# Patient Record
Sex: FEMALE | Race: OTHER | HISPANIC OR LATINO | URBAN - METROPOLITAN AREA
[De-identification: names, ages, dates, MRNs, and addresses within clinical notes are randomized per-mention and may not be internally consistent; named-entity substitution may affect disease eponyms.]

---

## 2018-01-01 ENCOUNTER — INPATIENT (INPATIENT)
Facility: HOSPITAL | Age: 83
LOS: 6 days | DRG: 871 | End: 2018-03-11
Attending: INTERNAL MEDICINE | Admitting: INTERNAL MEDICINE
Payer: MEDICAID

## 2018-01-01 VITALS
WEIGHT: 145.95 LBS | DIASTOLIC BLOOD PRESSURE: 66 MMHG | RESPIRATION RATE: 18 BRPM | TEMPERATURE: 98 F | OXYGEN SATURATION: 100 % | HEART RATE: 56 BPM | SYSTOLIC BLOOD PRESSURE: 104 MMHG

## 2018-01-01 DIAGNOSIS — J44.1 CHRONIC OBSTRUCTIVE PULMONARY DISEASE WITH (ACUTE) EXACERBATION: ICD-10-CM

## 2018-01-01 DIAGNOSIS — I50.9 HEART FAILURE, UNSPECIFIED: ICD-10-CM

## 2018-01-01 DIAGNOSIS — D64.9 ANEMIA, UNSPECIFIED: ICD-10-CM

## 2018-01-01 DIAGNOSIS — J18.9 PNEUMONIA, UNSPECIFIED ORGANISM: ICD-10-CM

## 2018-01-01 DIAGNOSIS — E87.5 HYPERKALEMIA: ICD-10-CM

## 2018-01-01 DIAGNOSIS — N18.5 CHRONIC KIDNEY DISEASE, STAGE 5: ICD-10-CM

## 2018-01-01 DIAGNOSIS — A48.1 LEGIONNAIRES' DISEASE: ICD-10-CM

## 2018-01-01 DIAGNOSIS — E03.9 HYPOTHYROIDISM, UNSPECIFIED: ICD-10-CM

## 2018-01-01 DIAGNOSIS — J45.901 UNSPECIFIED ASTHMA WITH (ACUTE) EXACERBATION: ICD-10-CM

## 2018-01-01 DIAGNOSIS — R06.02 SHORTNESS OF BREATH: ICD-10-CM

## 2018-01-01 DIAGNOSIS — Z29.9 ENCOUNTER FOR PROPHYLACTIC MEASURES, UNSPECIFIED: ICD-10-CM

## 2018-01-01 DIAGNOSIS — J96.00 ACUTE RESPIRATORY FAILURE, UNSPECIFIED WHETHER WITH HYPOXIA OR HYPERCAPNIA: ICD-10-CM

## 2018-01-01 DIAGNOSIS — I10 ESSENTIAL (PRIMARY) HYPERTENSION: ICD-10-CM

## 2018-01-01 DIAGNOSIS — N18.9 CHRONIC KIDNEY DISEASE, UNSPECIFIED: ICD-10-CM

## 2018-01-01 DIAGNOSIS — T14.8XXA OTHER INJURY OF UNSPECIFIED BODY REGION, INITIAL ENCOUNTER: ICD-10-CM

## 2018-01-01 DIAGNOSIS — J18.1 LOBAR PNEUMONIA, UNSPECIFIED ORGANISM: ICD-10-CM

## 2018-01-01 LAB
ABO RH CONFIRMATION: SIGNIFICANT CHANGE UP
ALBUMIN SERPL ELPH-MCNC: 2.3 G/DL — LOW (ref 3.5–5)
ALBUMIN SERPL ELPH-MCNC: 2.5 G/DL — LOW (ref 3.5–5)
ALP SERPL-CCNC: 165 U/L — HIGH (ref 40–120)
ALP SERPL-CCNC: 80 U/L — SIGNIFICANT CHANGE UP (ref 40–120)
ALT FLD-CCNC: 158 U/L DA — HIGH (ref 10–60)
ALT FLD-CCNC: 60 U/L DA — SIGNIFICANT CHANGE UP (ref 10–60)
ANION GAP SERPL CALC-SCNC: 10 MMOL/L — SIGNIFICANT CHANGE UP (ref 5–17)
ANION GAP SERPL CALC-SCNC: 12 MMOL/L — SIGNIFICANT CHANGE UP (ref 5–17)
ANION GAP SERPL CALC-SCNC: 12 MMOL/L — SIGNIFICANT CHANGE UP (ref 5–17)
ANION GAP SERPL CALC-SCNC: 13 MMOL/L — SIGNIFICANT CHANGE UP (ref 5–17)
ANION GAP SERPL CALC-SCNC: 14 MMOL/L — SIGNIFICANT CHANGE UP (ref 5–17)
ANION GAP SERPL CALC-SCNC: 17 MMOL/L — SIGNIFICANT CHANGE UP (ref 5–17)
APTT BLD: 40.1 SEC — HIGH (ref 27.5–37.4)
APTT BLD: 76.4 SEC — HIGH (ref 27.5–37.4)
AST SERPL-CCNC: 148 U/L — HIGH (ref 10–40)
AST SERPL-CCNC: 36 U/L — SIGNIFICANT CHANGE UP (ref 10–40)
BASE EXCESS BLDV CALC-SCNC: -3.4 MMOL/L — LOW (ref -2–2)
BASOPHILS # BLD AUTO: 0 K/UL — SIGNIFICANT CHANGE UP (ref 0–0.2)
BASOPHILS # BLD AUTO: 0 K/UL — SIGNIFICANT CHANGE UP (ref 0–0.2)
BASOPHILS # BLD AUTO: 0.1 K/UL — SIGNIFICANT CHANGE UP (ref 0–0.2)
BASOPHILS # BLD AUTO: 0.1 K/UL — SIGNIFICANT CHANGE UP (ref 0–0.2)
BASOPHILS NFR BLD AUTO: 0.1 % — SIGNIFICANT CHANGE UP (ref 0–2)
BASOPHILS NFR BLD AUTO: 0.2 % — SIGNIFICANT CHANGE UP (ref 0–2)
BASOPHILS NFR BLD AUTO: 0.4 % — SIGNIFICANT CHANGE UP (ref 0–2)
BASOPHILS NFR BLD AUTO: 0.4 % — SIGNIFICANT CHANGE UP (ref 0–2)
BILIRUB SERPL-MCNC: 0.5 MG/DL — SIGNIFICANT CHANGE UP (ref 0.2–1.2)
BILIRUB SERPL-MCNC: 0.6 MG/DL — SIGNIFICANT CHANGE UP (ref 0.2–1.2)
BUN SERPL-MCNC: 105 MG/DL — HIGH (ref 7–18)
BUN SERPL-MCNC: 112 MG/DL — HIGH (ref 7–18)
BUN SERPL-MCNC: 112 MG/DL — HIGH (ref 7–18)
BUN SERPL-MCNC: 116 MG/DL — HIGH (ref 7–18)
BUN SERPL-MCNC: 116 MG/DL — HIGH (ref 7–18)
BUN SERPL-MCNC: 82 MG/DL — HIGH (ref 7–18)
BUN SERPL-MCNC: 89 MG/DL — HIGH (ref 7–18)
BUN SERPL-MCNC: 96 MG/DL — HIGH (ref 7–18)
CALCIUM SERPL-MCNC: 7.8 MG/DL — LOW (ref 8.4–10.5)
CALCIUM SERPL-MCNC: 8.2 MG/DL — LOW (ref 8.4–10.5)
CALCIUM SERPL-MCNC: 8.4 MG/DL — SIGNIFICANT CHANGE UP (ref 8.4–10.5)
CALCIUM SERPL-MCNC: 8.5 MG/DL — SIGNIFICANT CHANGE UP (ref 8.4–10.5)
CALCIUM SERPL-MCNC: 8.5 MG/DL — SIGNIFICANT CHANGE UP (ref 8.4–10.5)
CALCIUM SERPL-MCNC: 8.8 MG/DL — SIGNIFICANT CHANGE UP (ref 8.4–10.5)
CALCIUM SERPL-MCNC: 8.8 MG/DL — SIGNIFICANT CHANGE UP (ref 8.4–10.5)
CHLORIDE SERPL-SCNC: 105 MMOL/L — SIGNIFICANT CHANGE UP (ref 96–108)
CHLORIDE SERPL-SCNC: 108 MMOL/L — SIGNIFICANT CHANGE UP (ref 96–108)
CHLORIDE SERPL-SCNC: 109 MMOL/L — HIGH (ref 96–108)
CHLORIDE SERPL-SCNC: 112 MMOL/L — HIGH (ref 96–108)
CHLORIDE SERPL-SCNC: 112 MMOL/L — HIGH (ref 96–108)
CHLORIDE SERPL-SCNC: 113 MMOL/L — HIGH (ref 96–108)
CHLORIDE UR-SCNC: <10 MMOL/L — LOW (ref 55–125)
CHOLEST SERPL-MCNC: 67 MG/DL — SIGNIFICANT CHANGE UP (ref 10–199)
CK MB BLD-MCNC: 4.8 % — HIGH (ref 0–3.5)
CK MB CFR SERPL CALC: 1 NG/ML — SIGNIFICANT CHANGE UP (ref 0–3.6)
CK SERPL-CCNC: 21 U/L — SIGNIFICANT CHANGE UP (ref 21–215)
CO2 SERPL-SCNC: 19 MMOL/L — LOW (ref 22–31)
CO2 SERPL-SCNC: 20 MMOL/L — LOW (ref 22–31)
CO2 SERPL-SCNC: 21 MMOL/L — LOW (ref 22–31)
CO2 SERPL-SCNC: 22 MMOL/L — SIGNIFICANT CHANGE UP (ref 22–31)
CO2 SERPL-SCNC: 23 MMOL/L — SIGNIFICANT CHANGE UP (ref 22–31)
CO2 SERPL-SCNC: 24 MMOL/L — SIGNIFICANT CHANGE UP (ref 22–31)
CREAT ?TM UR-MCNC: 99 MG/DL — SIGNIFICANT CHANGE UP
CREAT SERPL-MCNC: 2.53 MG/DL — HIGH (ref 0.5–1.3)
CREAT SERPL-MCNC: 2.7 MG/DL — HIGH (ref 0.5–1.3)
CREAT SERPL-MCNC: 2.85 MG/DL — HIGH (ref 0.5–1.3)
CREAT SERPL-MCNC: 3.57 MG/DL — HIGH (ref 0.5–1.3)
CREAT SERPL-MCNC: 3.57 MG/DL — HIGH (ref 0.5–1.3)
CREAT SERPL-MCNC: 3.64 MG/DL — HIGH (ref 0.5–1.3)
CREAT SERPL-MCNC: 3.89 MG/DL — HIGH (ref 0.5–1.3)
CREAT SERPL-MCNC: 3.93 MG/DL — HIGH (ref 0.5–1.3)
CULTURE RESULTS: SIGNIFICANT CHANGE UP
D DIMER BLD IA.RAPID-MCNC: 1020 NG/ML DDU — HIGH
EOSINOPHIL # BLD AUTO: 0 K/UL — SIGNIFICANT CHANGE UP (ref 0–0.5)
EOSINOPHIL # BLD AUTO: 0.1 K/UL — SIGNIFICANT CHANGE UP (ref 0–0.5)
EOSINOPHIL NFR BLD AUTO: 0 % — SIGNIFICANT CHANGE UP (ref 0–6)
EOSINOPHIL NFR BLD AUTO: 0.6 % — SIGNIFICANT CHANGE UP (ref 0–6)
FERRITIN SERPL-MCNC: 433 NG/ML — HIGH (ref 15–150)
FIBRINOGEN PPP-MCNC: 492 MG/DL — SIGNIFICANT CHANGE UP (ref 310–510)
FOLATE SERPL-MCNC: >20 NG/ML — SIGNIFICANT CHANGE UP (ref 4.8–24.2)
FSP PPP-MCNC: >=5 <20
GLUCOSE BLDC GLUCOMTR-MCNC: 109 MG/DL — HIGH (ref 70–99)
GLUCOSE BLDC GLUCOMTR-MCNC: 130 MG/DL — HIGH (ref 70–99)
GLUCOSE BLDC GLUCOMTR-MCNC: 134 MG/DL — HIGH (ref 70–99)
GLUCOSE BLDC GLUCOMTR-MCNC: 138 MG/DL — HIGH (ref 70–99)
GLUCOSE BLDC GLUCOMTR-MCNC: 138 MG/DL — HIGH (ref 70–99)
GLUCOSE BLDC GLUCOMTR-MCNC: 147 MG/DL — HIGH (ref 70–99)
GLUCOSE BLDC GLUCOMTR-MCNC: 147 MG/DL — HIGH (ref 70–99)
GLUCOSE BLDC GLUCOMTR-MCNC: 149 MG/DL — HIGH (ref 70–99)
GLUCOSE BLDC GLUCOMTR-MCNC: 161 MG/DL — HIGH (ref 70–99)
GLUCOSE BLDC GLUCOMTR-MCNC: 164 MG/DL — HIGH (ref 70–99)
GLUCOSE BLDC GLUCOMTR-MCNC: 173 MG/DL — HIGH (ref 70–99)
GLUCOSE BLDC GLUCOMTR-MCNC: 181 MG/DL — HIGH (ref 70–99)
GLUCOSE BLDC GLUCOMTR-MCNC: 189 MG/DL — HIGH (ref 70–99)
GLUCOSE BLDC GLUCOMTR-MCNC: 196 MG/DL — HIGH (ref 70–99)
GLUCOSE BLDC GLUCOMTR-MCNC: 197 MG/DL — HIGH (ref 70–99)
GLUCOSE BLDC GLUCOMTR-MCNC: 199 MG/DL — HIGH (ref 70–99)
GLUCOSE BLDC GLUCOMTR-MCNC: 199 MG/DL — HIGH (ref 70–99)
GLUCOSE BLDC GLUCOMTR-MCNC: 220 MG/DL — HIGH (ref 70–99)
GLUCOSE BLDC GLUCOMTR-MCNC: 231 MG/DL — HIGH (ref 70–99)
GLUCOSE BLDC GLUCOMTR-MCNC: 233 MG/DL — HIGH (ref 70–99)
GLUCOSE BLDC GLUCOMTR-MCNC: 234 MG/DL — HIGH (ref 70–99)
GLUCOSE BLDC GLUCOMTR-MCNC: 249 MG/DL — HIGH (ref 70–99)
GLUCOSE BLDC GLUCOMTR-MCNC: 250 MG/DL — HIGH (ref 70–99)
GLUCOSE BLDC GLUCOMTR-MCNC: 305 MG/DL — HIGH (ref 70–99)
GLUCOSE BLDC GLUCOMTR-MCNC: 325 MG/DL — HIGH (ref 70–99)
GLUCOSE SERPL-MCNC: 121 MG/DL — HIGH (ref 70–99)
GLUCOSE SERPL-MCNC: 125 MG/DL — HIGH (ref 70–99)
GLUCOSE SERPL-MCNC: 140 MG/DL — HIGH (ref 70–99)
GLUCOSE SERPL-MCNC: 142 MG/DL — HIGH (ref 70–99)
GLUCOSE SERPL-MCNC: 151 MG/DL — HIGH (ref 70–99)
GLUCOSE SERPL-MCNC: 151 MG/DL — HIGH (ref 70–99)
GLUCOSE SERPL-MCNC: 193 MG/DL — HIGH (ref 70–99)
GLUCOSE SERPL-MCNC: 229 MG/DL — HIGH (ref 70–99)
GRAM STN FLD: SIGNIFICANT CHANGE UP
HAPTOGLOB SERPL-MCNC: 305 MG/DL — HIGH (ref 34–200)
HBA1C BLD-MCNC: 6 % — HIGH (ref 4–5.6)
HBV SURFACE AB SER-ACNC: SIGNIFICANT CHANGE UP
HBV SURFACE AG SER-ACNC: SIGNIFICANT CHANGE UP
HCO3 BLDV-SCNC: 22 MMOL/L — SIGNIFICANT CHANGE UP (ref 21–29)
HCT VFR BLD CALC: 20.1 % — CRITICAL LOW (ref 34.5–45)
HCT VFR BLD CALC: 20.3 % — CRITICAL LOW (ref 34.5–45)
HCT VFR BLD CALC: 20.5 % — CRITICAL LOW (ref 34.5–45)
HCT VFR BLD CALC: 21.2 % — LOW (ref 34.5–45)
HCT VFR BLD CALC: 22.8 % — LOW (ref 34.5–45)
HCT VFR BLD CALC: 23.1 % — LOW (ref 34.5–45)
HCT VFR BLD CALC: 24 % — LOW (ref 34.5–45)
HCT VFR BLD CALC: 24.7 % — LOW (ref 34.5–45)
HCT VFR BLD CALC: 25 % — LOW (ref 34.5–45)
HCT VFR BLD CALC: 26.1 % — LOW (ref 34.5–45)
HCV AB S/CO SERPL IA: 0.06 S/CO — SIGNIFICANT CHANGE UP
HCV AB SERPL-IMP: SIGNIFICANT CHANGE UP
HDLC SERPL-MCNC: 23 MG/DL — LOW (ref 40–125)
HGB BLD-MCNC: 6.2 G/DL — CRITICAL LOW (ref 11.5–15.5)
HGB BLD-MCNC: 7.1 G/DL — LOW (ref 11.5–15.5)
HGB BLD-MCNC: 7.1 G/DL — LOW (ref 11.5–15.5)
HGB BLD-MCNC: 7.2 G/DL — LOW (ref 11.5–15.5)
HGB BLD-MCNC: 7.2 G/DL — LOW (ref 11.5–15.5)
HGB BLD-MCNC: 7.4 G/DL — LOW (ref 11.5–15.5)
HGB BLD-MCNC: 7.6 G/DL — LOW (ref 11.5–15.5)
HGB BLD-MCNC: 7.7 G/DL — LOW (ref 11.5–15.5)
HGB BLD-MCNC: 7.8 G/DL — LOW (ref 11.5–15.5)
HGB BLD-MCNC: 8.2 G/DL — LOW (ref 11.5–15.5)
HOROWITZ INDEX BLDV+IHG-RTO: 21 — SIGNIFICANT CHANGE UP
INR BLD: 1.33 RATIO — HIGH (ref 0.88–1.16)
INR BLD: 1.38 RATIO — HIGH (ref 0.88–1.16)
IRON SATN MFR SERPL: 16 UG/DL — LOW (ref 40–150)
IRON SATN MFR SERPL: 9 % — LOW (ref 15–50)
LACTATE SERPL-SCNC: 1.4 MMOL/L — SIGNIFICANT CHANGE UP (ref 0.7–2)
LDH SERPL L TO P-CCNC: 314 U/L — HIGH (ref 120–225)
LEGIONELLA AB SER-ACNC: POSITIVE
LEGIONELLA AG UR QL: NEGATIVE — SIGNIFICANT CHANGE UP
LEGIONELLA AG UR QL: NEGATIVE — SIGNIFICANT CHANGE UP
LIPID PNL WITH DIRECT LDL SERPL: 27 MG/DL — SIGNIFICANT CHANGE UP
LYMPHOCYTES # BLD AUTO: 0.4 K/UL — LOW (ref 1–3.3)
LYMPHOCYTES # BLD AUTO: 0.5 K/UL — LOW (ref 1–3.3)
LYMPHOCYTES # BLD AUTO: 0.5 K/UL — LOW (ref 1–3.3)
LYMPHOCYTES # BLD AUTO: 0.7 K/UL — LOW (ref 1–3.3)
LYMPHOCYTES # BLD AUTO: 2 % — LOW (ref 13–44)
LYMPHOCYTES # BLD AUTO: 2.4 % — LOW (ref 13–44)
LYMPHOCYTES # BLD AUTO: 2.7 % — LOW (ref 13–44)
LYMPHOCYTES # BLD AUTO: 3 % — LOW (ref 13–44)
LYMPHOCYTES # BLD AUTO: 4.4 % — LOW (ref 13–44)
MAGNESIUM SERPL-MCNC: 2.7 MG/DL — HIGH (ref 1.6–2.6)
MAGNESIUM SERPL-MCNC: 2.9 MG/DL — HIGH (ref 1.6–2.6)
MAGNESIUM SERPL-MCNC: 3 MG/DL — HIGH (ref 1.6–2.6)
MAGNESIUM SERPL-MCNC: 3.1 MG/DL — HIGH (ref 1.6–2.6)
MCHC RBC-ENTMCNC: 29.2 PG — SIGNIFICANT CHANGE UP (ref 27–34)
MCHC RBC-ENTMCNC: 29.3 PG — SIGNIFICANT CHANGE UP (ref 27–34)
MCHC RBC-ENTMCNC: 30 GM/DL — LOW (ref 32–36)
MCHC RBC-ENTMCNC: 30.4 GM/DL — LOW (ref 32–36)
MCHC RBC-ENTMCNC: 30.5 PG — SIGNIFICANT CHANGE UP (ref 27–34)
MCHC RBC-ENTMCNC: 30.6 GM/DL — LOW (ref 32–36)
MCHC RBC-ENTMCNC: 30.6 PG — SIGNIFICANT CHANGE UP (ref 27–34)
MCHC RBC-ENTMCNC: 30.6 PG — SIGNIFICANT CHANGE UP (ref 27–34)
MCHC RBC-ENTMCNC: 30.8 GM/DL — LOW (ref 32–36)
MCHC RBC-ENTMCNC: 31 PG — SIGNIFICANT CHANGE UP (ref 27–34)
MCHC RBC-ENTMCNC: 31.3 PG — SIGNIFICANT CHANGE UP (ref 27–34)
MCHC RBC-ENTMCNC: 31.6 GM/DL — LOW (ref 32–36)
MCHC RBC-ENTMCNC: 31.8 GM/DL — LOW (ref 32–36)
MCHC RBC-ENTMCNC: 32 PG — SIGNIFICANT CHANGE UP (ref 27–34)
MCHC RBC-ENTMCNC: 32.3 PG — SIGNIFICANT CHANGE UP (ref 27–34)
MCHC RBC-ENTMCNC: 32.4 GM/DL — SIGNIFICANT CHANGE UP (ref 32–36)
MCHC RBC-ENTMCNC: 32.8 PG — SIGNIFICANT CHANGE UP (ref 27–34)
MCHC RBC-ENTMCNC: 34.9 GM/DL — SIGNIFICANT CHANGE UP (ref 32–36)
MCHC RBC-ENTMCNC: 35.1 GM/DL — SIGNIFICANT CHANGE UP (ref 32–36)
MCHC RBC-ENTMCNC: 36.3 GM/DL — HIGH (ref 32–36)
MCV RBC AUTO: 100 FL — SIGNIFICANT CHANGE UP (ref 80–100)
MCV RBC AUTO: 100.7 FL — HIGH (ref 80–100)
MCV RBC AUTO: 100.9 FL — HIGH (ref 80–100)
MCV RBC AUTO: 101.2 FL — HIGH (ref 80–100)
MCV RBC AUTO: 88.6 FL — SIGNIFICANT CHANGE UP (ref 80–100)
MCV RBC AUTO: 88.9 FL — SIGNIFICANT CHANGE UP (ref 80–100)
MCV RBC AUTO: 89.3 FL — SIGNIFICANT CHANGE UP (ref 80–100)
MCV RBC AUTO: 95.3 FL — SIGNIFICANT CHANGE UP (ref 80–100)
MCV RBC AUTO: 96.6 FL — SIGNIFICANT CHANGE UP (ref 80–100)
MCV RBC AUTO: 97.3 FL — SIGNIFICANT CHANGE UP (ref 80–100)
MONOCYTES # BLD AUTO: 0.6 K/UL — SIGNIFICANT CHANGE UP (ref 0–0.9)
MONOCYTES # BLD AUTO: 0.6 K/UL — SIGNIFICANT CHANGE UP (ref 0–0.9)
MONOCYTES # BLD AUTO: 1.2 K/UL — HIGH (ref 0–0.9)
MONOCYTES # BLD AUTO: 1.6 K/UL — HIGH (ref 0–0.9)
MONOCYTES NFR BLD AUTO: 3 % — SIGNIFICANT CHANGE UP (ref 2–14)
MONOCYTES NFR BLD AUTO: 3.4 % — SIGNIFICANT CHANGE UP (ref 2–14)
MONOCYTES NFR BLD AUTO: 3.8 % — SIGNIFICANT CHANGE UP (ref 2–14)
MONOCYTES NFR BLD AUTO: 6.4 % — SIGNIFICANT CHANGE UP (ref 2–14)
MONOCYTES NFR BLD AUTO: 7 % — SIGNIFICANT CHANGE UP (ref 2–14)
NEUTROPHILS # BLD AUTO: 14.8 K/UL — HIGH (ref 1.8–7.4)
NEUTROPHILS # BLD AUTO: 15.4 K/UL — HIGH (ref 1.8–7.4)
NEUTROPHILS # BLD AUTO: 16.9 K/UL — HIGH (ref 1.8–7.4)
NEUTROPHILS # BLD AUTO: 23.4 K/UL — HIGH (ref 1.8–7.4)
NEUTROPHILS NFR BLD AUTO: 87.7 % — HIGH (ref 43–77)
NEUTROPHILS NFR BLD AUTO: 91 % — HIGH (ref 43–77)
NEUTROPHILS NFR BLD AUTO: 91.2 % — HIGH (ref 43–77)
NEUTROPHILS NFR BLD AUTO: 93.6 % — HIGH (ref 43–77)
NEUTROPHILS NFR BLD AUTO: 93.8 % — HIGH (ref 43–77)
NT-PROBNP SERPL-SCNC: 8534 PG/ML — HIGH (ref 0–450)
OSMOLALITY UR: 374 MOS/KG — SIGNIFICANT CHANGE UP (ref 50–1200)
OSMOLALITY UR: 424 MOS/KG — SIGNIFICANT CHANGE UP (ref 50–1200)
PCO2 BLDV: 44 MMHG — SIGNIFICANT CHANGE UP (ref 35–50)
PH BLDV: 7.32 — LOW (ref 7.35–7.45)
PHOSPHATE SERPL-MCNC: 5.1 MG/DL — HIGH (ref 2.5–4.5)
PHOSPHATE SERPL-MCNC: 6 MG/DL — HIGH (ref 2.5–4.5)
PHOSPHATE SERPL-MCNC: 6 MG/DL — HIGH (ref 2.5–4.5)
PHOSPHATE SERPL-MCNC: 6.2 MG/DL — HIGH (ref 2.5–4.5)
PHOSPHATE SERPL-MCNC: 6.4 MG/DL — HIGH (ref 2.5–4.5)
PHOSPHATE SERPL-MCNC: 6.8 MG/DL — HIGH (ref 2.5–4.5)
PLATELET # BLD AUTO: 100 K/UL — LOW (ref 150–400)
PLATELET # BLD AUTO: 142 K/UL — LOW (ref 150–400)
PLATELET # BLD AUTO: 160 K/UL — SIGNIFICANT CHANGE UP (ref 150–400)
PLATELET # BLD AUTO: 168 K/UL — SIGNIFICANT CHANGE UP (ref 150–400)
PLATELET # BLD AUTO: 170 K/UL — SIGNIFICANT CHANGE UP (ref 150–400)
PLATELET # BLD AUTO: 172 K/UL — SIGNIFICANT CHANGE UP (ref 150–400)
PLATELET # BLD AUTO: 180 K/UL — SIGNIFICANT CHANGE UP (ref 150–400)
PLATELET # BLD AUTO: 189 K/UL — SIGNIFICANT CHANGE UP (ref 150–400)
PLATELET # BLD AUTO: 190 K/UL — SIGNIFICANT CHANGE UP (ref 150–400)
PLATELET # BLD AUTO: 92 K/UL — LOW (ref 150–400)
PO2 BLDV: SIGNIFICANT CHANGE UP MMHG (ref 25–45)
POTASSIUM SERPL-MCNC: 3.5 MMOL/L — SIGNIFICANT CHANGE UP (ref 3.5–5.3)
POTASSIUM SERPL-MCNC: 3.8 MMOL/L — SIGNIFICANT CHANGE UP (ref 3.5–5.3)
POTASSIUM SERPL-MCNC: 4 MMOL/L — SIGNIFICANT CHANGE UP (ref 3.5–5.3)
POTASSIUM SERPL-MCNC: 4.2 MMOL/L — SIGNIFICANT CHANGE UP (ref 3.5–5.3)
POTASSIUM SERPL-MCNC: 4.5 MMOL/L — SIGNIFICANT CHANGE UP (ref 3.5–5.3)
POTASSIUM SERPL-MCNC: 4.9 MMOL/L — SIGNIFICANT CHANGE UP (ref 3.5–5.3)
POTASSIUM SERPL-MCNC: 5.5 MMOL/L — HIGH (ref 3.5–5.3)
POTASSIUM SERPL-MCNC: 6.1 MMOL/L — HIGH (ref 3.5–5.3)
POTASSIUM SERPL-SCNC: 3.5 MMOL/L — SIGNIFICANT CHANGE UP (ref 3.5–5.3)
POTASSIUM SERPL-SCNC: 3.8 MMOL/L — SIGNIFICANT CHANGE UP (ref 3.5–5.3)
POTASSIUM SERPL-SCNC: 4 MMOL/L — SIGNIFICANT CHANGE UP (ref 3.5–5.3)
POTASSIUM SERPL-SCNC: 4.2 MMOL/L — SIGNIFICANT CHANGE UP (ref 3.5–5.3)
POTASSIUM SERPL-SCNC: 4.5 MMOL/L — SIGNIFICANT CHANGE UP (ref 3.5–5.3)
POTASSIUM SERPL-SCNC: 4.9 MMOL/L — SIGNIFICANT CHANGE UP (ref 3.5–5.3)
POTASSIUM SERPL-SCNC: 5.5 MMOL/L — HIGH (ref 3.5–5.3)
POTASSIUM SERPL-SCNC: 6.1 MMOL/L — HIGH (ref 3.5–5.3)
POTASSIUM UR-SCNC: 16 MMOL/L — LOW (ref 25–125)
POTASSIUM UR-SCNC: 39 MMOL/L — SIGNIFICANT CHANGE UP (ref 25–125)
PROCALCITONIN SERPL-MCNC: 0.94 NG/ML — HIGH (ref 0–0.04)
PROCALCITONIN SERPL-MCNC: 1.02 NG/ML — HIGH (ref 0–0.04)
PROT ?TM UR-MCNC: 24 MG/DL — HIGH (ref 0–12)
PROT SERPL-MCNC: 5.5 G/DL — LOW (ref 6–8.3)
PROT SERPL-MCNC: 7.5 G/DL — SIGNIFICANT CHANGE UP (ref 6–8.3)
PROTHROM AB SERPL-ACNC: 14.6 SEC — HIGH (ref 9.8–12.7)
PROTHROM AB SERPL-ACNC: 15.1 SEC — HIGH (ref 9.8–12.7)
PTH-INTACT FLD-MCNC: 150 PG/ML — HIGH (ref 15–65)
RAPID RVP RESULT: SIGNIFICANT CHANGE UP
RBC # BLD: 1.86 M/UL — LOW (ref 3.8–5.2)
RBC # BLD: 2.11 M/UL — LOW (ref 3.8–5.2)
RBC # BLD: 2.25 M/UL — LOW (ref 3.8–5.2)
RBC # BLD: 2.27 M/UL — LOW (ref 3.8–5.2)
RBC # BLD: 2.31 M/UL — LOW (ref 3.8–5.2)
RBC # BLD: 2.32 M/UL — LOW (ref 3.8–5.2)
RBC # BLD: 2.39 M/UL — LOW (ref 3.8–5.2)
RBC # BLD: 2.45 M/UL — LOW (ref 3.8–5.2)
RBC # BLD: 2.46 M/UL — LOW (ref 3.8–5.2)
RBC # BLD: 2.48 M/UL — LOW (ref 3.8–5.2)
RBC # BLD: 2.7 M/UL — LOW (ref 3.8–5.2)
RBC # FLD: 13.1 % — SIGNIFICANT CHANGE UP (ref 10.3–14.5)
RBC # FLD: 13.1 % — SIGNIFICANT CHANGE UP (ref 10.3–14.5)
RBC # FLD: 13.2 % — SIGNIFICANT CHANGE UP (ref 10.3–14.5)
RBC # FLD: 13.5 % — SIGNIFICANT CHANGE UP (ref 10.3–14.5)
RBC # FLD: 14.2 % — SIGNIFICANT CHANGE UP (ref 10.3–14.5)
RBC # FLD: 14.3 % — SIGNIFICANT CHANGE UP (ref 10.3–14.5)
RBC # FLD: 14.6 % — HIGH (ref 10.3–14.5)
RBC # FLD: 16.1 % — HIGH (ref 10.3–14.5)
RBC # FLD: 16.7 % — HIGH (ref 10.3–14.5)
RBC # FLD: 17 % — HIGH (ref 10.3–14.5)
RETICS #: 37.2 K/UL — SIGNIFICANT CHANGE UP (ref 25–125)
RETICS/RBC NFR: 2 % — SIGNIFICANT CHANGE UP (ref 0.5–2.5)
SAO2 % BLDV: 59 % — LOW (ref 67–88)
SODIUM SERPL-SCNC: 137 MMOL/L — SIGNIFICANT CHANGE UP (ref 135–145)
SODIUM SERPL-SCNC: 138 MMOL/L — SIGNIFICANT CHANGE UP (ref 135–145)
SODIUM SERPL-SCNC: 142 MMOL/L — SIGNIFICANT CHANGE UP (ref 135–145)
SODIUM SERPL-SCNC: 142 MMOL/L — SIGNIFICANT CHANGE UP (ref 135–145)
SODIUM SERPL-SCNC: 145 MMOL/L — SIGNIFICANT CHANGE UP (ref 135–145)
SODIUM SERPL-SCNC: 148 MMOL/L — HIGH (ref 135–145)
SODIUM SERPL-SCNC: 149 MMOL/L — HIGH (ref 135–145)
SODIUM SERPL-SCNC: 149 MMOL/L — HIGH (ref 135–145)
SODIUM UR-SCNC: 16 MMOL/L — LOW (ref 40–220)
SODIUM UR-SCNC: 22 MMOL/L — LOW (ref 40–220)
SPECIMEN SOURCE: SIGNIFICANT CHANGE UP
TIBC SERPL-MCNC: 175 UG/DL — LOW (ref 250–450)
TOTAL CHOLESTEROL/HDL RATIO MEASUREMENT: 2.9 RATIO — LOW (ref 3.3–7.1)
TRIGL SERPL-MCNC: 84 MG/DL — SIGNIFICANT CHANGE UP (ref 10–149)
TROPONIN I SERPL-MCNC: 0.04 NG/ML — SIGNIFICANT CHANGE UP (ref 0–0.04)
TROPONIN I SERPL-MCNC: 0.05 NG/ML — HIGH (ref 0–0.04)
TSH SERPL-MCNC: 2.12 UU/ML — SIGNIFICANT CHANGE UP (ref 0.34–4.82)
UIBC SERPL-MCNC: 159 UG/DL — SIGNIFICANT CHANGE UP (ref 110–370)
VIT B12 SERPL-MCNC: >2000 PG/ML — HIGH (ref 232–1245)
WBC # BLD: 14.9 K/UL — HIGH (ref 3.8–10.5)
WBC # BLD: 15.7 K/UL — HIGH (ref 3.8–10.5)
WBC # BLD: 16.4 K/UL — HIGH (ref 3.8–10.5)
WBC # BLD: 16.9 K/UL — HIGH (ref 3.8–10.5)
WBC # BLD: 18 K/UL — HIGH (ref 3.8–10.5)
WBC # BLD: 19.3 K/UL — HIGH (ref 3.8–10.5)
WBC # BLD: 23 K/UL — HIGH (ref 3.8–10.5)
WBC # BLD: 23.9 K/UL — HIGH (ref 3.8–10.5)
WBC # BLD: 24.6 K/UL — HIGH (ref 3.8–10.5)
WBC # BLD: 25.6 K/UL — HIGH (ref 3.8–10.5)
WBC # FLD AUTO: 14.9 K/UL — HIGH (ref 3.8–10.5)
WBC # FLD AUTO: 15.7 K/UL — HIGH (ref 3.8–10.5)
WBC # FLD AUTO: 16.4 K/UL — HIGH (ref 3.8–10.5)
WBC # FLD AUTO: 16.9 K/UL — HIGH (ref 3.8–10.5)
WBC # FLD AUTO: 18 K/UL — HIGH (ref 3.8–10.5)
WBC # FLD AUTO: 19.3 K/UL — HIGH (ref 3.8–10.5)
WBC # FLD AUTO: 23 K/UL — HIGH (ref 3.8–10.5)
WBC # FLD AUTO: 23.9 K/UL — HIGH (ref 3.8–10.5)
WBC # FLD AUTO: 24.6 K/UL — HIGH (ref 3.8–10.5)
WBC # FLD AUTO: 25.6 K/UL — HIGH (ref 3.8–10.5)

## 2018-01-01 PROCEDURE — 93306 TTE W/DOPPLER COMPLETE: CPT

## 2018-01-01 PROCEDURE — 82570 ASSAY OF URINE CREATININE: CPT

## 2018-01-01 PROCEDURE — 85610 PROTHROMBIN TIME: CPT

## 2018-01-01 PROCEDURE — 71045 X-RAY EXAM CHEST 1 VIEW: CPT | Mod: 26

## 2018-01-01 PROCEDURE — 85384 FIBRINOGEN ACTIVITY: CPT

## 2018-01-01 PROCEDURE — 87633 RESP VIRUS 12-25 TARGETS: CPT

## 2018-01-01 PROCEDURE — 83605 ASSAY OF LACTIC ACID: CPT

## 2018-01-01 PROCEDURE — 71250 CT THORAX DX C-: CPT | Mod: 26

## 2018-01-01 PROCEDURE — 87340 HEPATITIS B SURFACE AG IA: CPT

## 2018-01-01 PROCEDURE — 99285 EMERGENCY DEPT VISIT HI MDM: CPT

## 2018-01-01 PROCEDURE — 96374 THER/PROPH/DIAG INJ IV PUSH: CPT

## 2018-01-01 PROCEDURE — 82436 ASSAY OF URINE CHLORIDE: CPT

## 2018-01-01 PROCEDURE — 82728 ASSAY OF FERRITIN: CPT

## 2018-01-01 PROCEDURE — 84443 ASSAY THYROID STIM HORMONE: CPT

## 2018-01-01 PROCEDURE — 86923 COMPATIBILITY TEST ELECTRIC: CPT

## 2018-01-01 PROCEDURE — 83615 LACTATE (LD) (LDH) ENZYME: CPT

## 2018-01-01 PROCEDURE — 82310 ASSAY OF CALCIUM: CPT

## 2018-01-01 PROCEDURE — 86900 BLOOD TYPING SEROLOGIC ABO: CPT

## 2018-01-01 PROCEDURE — 82962 GLUCOSE BLOOD TEST: CPT

## 2018-01-01 PROCEDURE — 85730 THROMBOPLASTIN TIME PARTIAL: CPT

## 2018-01-01 PROCEDURE — 84484 ASSAY OF TROPONIN QUANT: CPT

## 2018-01-01 PROCEDURE — 82550 ASSAY OF CK (CPK): CPT

## 2018-01-01 PROCEDURE — 86803 HEPATITIS C AB TEST: CPT

## 2018-01-01 PROCEDURE — 74018 RADEX ABDOMEN 1 VIEW: CPT

## 2018-01-01 PROCEDURE — 87798 DETECT AGENT NOS DNA AMP: CPT

## 2018-01-01 PROCEDURE — 93971 EXTREMITY STUDY: CPT

## 2018-01-01 PROCEDURE — 80061 LIPID PANEL: CPT

## 2018-01-01 PROCEDURE — 99223 1ST HOSP IP/OBS HIGH 75: CPT | Mod: GC

## 2018-01-01 PROCEDURE — 85027 COMPLETE CBC AUTOMATED: CPT

## 2018-01-01 PROCEDURE — 85045 AUTOMATED RETICULOCYTE COUNT: CPT

## 2018-01-01 PROCEDURE — 83970 ASSAY OF PARATHORMONE: CPT

## 2018-01-01 PROCEDURE — 86706 HEP B SURFACE ANTIBODY: CPT

## 2018-01-01 PROCEDURE — 84100 ASSAY OF PHOSPHORUS: CPT

## 2018-01-01 PROCEDURE — 82746 ASSAY OF FOLIC ACID SERUM: CPT

## 2018-01-01 PROCEDURE — 83935 ASSAY OF URINE OSMOLALITY: CPT

## 2018-01-01 PROCEDURE — 93005 ELECTROCARDIOGRAM TRACING: CPT

## 2018-01-01 PROCEDURE — 96375 TX/PRO/DX INJ NEW DRUG ADDON: CPT

## 2018-01-01 PROCEDURE — 87040 BLOOD CULTURE FOR BACTERIA: CPT

## 2018-01-01 PROCEDURE — 71045 X-RAY EXAM CHEST 1 VIEW: CPT

## 2018-01-01 PROCEDURE — 86901 BLOOD TYPING SEROLOGIC RH(D): CPT

## 2018-01-01 PROCEDURE — 99285 EMERGENCY DEPT VISIT HI MDM: CPT | Mod: 25

## 2018-01-01 PROCEDURE — 86850 RBC ANTIBODY SCREEN: CPT

## 2018-01-01 PROCEDURE — P9059: CPT

## 2018-01-01 PROCEDURE — 82553 CREATINE MB FRACTION: CPT

## 2018-01-01 PROCEDURE — 87486 CHLMYD PNEUM DNA AMP PROBE: CPT

## 2018-01-01 PROCEDURE — 87581 M.PNEUMON DNA AMP PROBE: CPT

## 2018-01-01 PROCEDURE — 85379 FIBRIN DEGRADATION QUANT: CPT

## 2018-01-01 PROCEDURE — 87070 CULTURE OTHR SPECIMN AEROBIC: CPT

## 2018-01-01 PROCEDURE — 36430 TRANSFUSION BLD/BLD COMPNT: CPT

## 2018-01-01 PROCEDURE — P9037: CPT

## 2018-01-01 PROCEDURE — 83880 ASSAY OF NATRIURETIC PEPTIDE: CPT

## 2018-01-01 PROCEDURE — 84300 ASSAY OF URINE SODIUM: CPT

## 2018-01-01 PROCEDURE — 82803 BLOOD GASES ANY COMBINATION: CPT

## 2018-01-01 PROCEDURE — 84145 PROCALCITONIN (PCT): CPT

## 2018-01-01 PROCEDURE — 80053 COMPREHEN METABOLIC PANEL: CPT

## 2018-01-01 PROCEDURE — 71250 CT THORAX DX C-: CPT

## 2018-01-01 PROCEDURE — 74176 CT ABD & PELVIS W/O CONTRAST: CPT | Mod: 26

## 2018-01-01 PROCEDURE — 87451 POLYVALENT MULT ORG EA AG IA: CPT

## 2018-01-01 PROCEDURE — 84156 ASSAY OF PROTEIN URINE: CPT

## 2018-01-01 PROCEDURE — 83010 ASSAY OF HAPTOGLOBIN QUANT: CPT

## 2018-01-01 PROCEDURE — 74018 RADEX ABDOMEN 1 VIEW: CPT | Mod: 26,59

## 2018-01-01 PROCEDURE — 94640 AIRWAY INHALATION TREATMENT: CPT

## 2018-01-01 PROCEDURE — 80048 BASIC METABOLIC PNL TOTAL CA: CPT

## 2018-01-01 PROCEDURE — 83735 ASSAY OF MAGNESIUM: CPT

## 2018-01-01 PROCEDURE — 84133 ASSAY OF URINE POTASSIUM: CPT

## 2018-01-01 PROCEDURE — 86713 LEGIONELLA ANTIBODY: CPT

## 2018-01-01 PROCEDURE — P9040: CPT

## 2018-01-01 PROCEDURE — 87449 NOS EACH ORGANISM AG IA: CPT

## 2018-01-01 PROCEDURE — 93971 EXTREMITY STUDY: CPT | Mod: 26,LT

## 2018-01-01 PROCEDURE — 85362 FIBRIN DEGRADATION PRODUCTS: CPT

## 2018-01-01 PROCEDURE — 74176 CT ABD & PELVIS W/O CONTRAST: CPT

## 2018-01-01 PROCEDURE — 82607 VITAMIN B-12: CPT

## 2018-01-01 PROCEDURE — 83036 HEMOGLOBIN GLYCOSYLATED A1C: CPT

## 2018-01-01 PROCEDURE — 94660 CPAP INITIATION&MGMT: CPT

## 2018-01-01 PROCEDURE — 83550 IRON BINDING TEST: CPT

## 2018-01-01 RX ORDER — INSULIN LISPRO 100/ML
VIAL (ML) SUBCUTANEOUS
Qty: 0 | Refills: 0 | Status: DISCONTINUED | OUTPATIENT
Start: 2018-01-01 | End: 2018-01-01

## 2018-01-01 RX ORDER — AZITHROMYCIN 500 MG/1
500 TABLET, FILM COATED ORAL DAILY
Qty: 0 | Refills: 0 | Status: DISCONTINUED | OUTPATIENT
Start: 2018-01-01 | End: 2018-01-01

## 2018-01-01 RX ORDER — HYDROMORPHONE HYDROCHLORIDE 2 MG/ML
0.5 INJECTION INTRAMUSCULAR; INTRAVENOUS; SUBCUTANEOUS
Qty: 0 | Refills: 0 | Status: DISCONTINUED | OUTPATIENT
Start: 2018-01-01 | End: 2018-01-01

## 2018-01-01 RX ORDER — KETOROLAC TROMETHAMINE 30 MG/ML
15 SYRINGE (ML) INJECTION ONCE
Qty: 0 | Refills: 0 | Status: DISCONTINUED | OUTPATIENT
Start: 2018-01-01 | End: 2018-01-01

## 2018-01-01 RX ORDER — AZITHROMYCIN 500 MG/1
500 TABLET, FILM COATED ORAL ONCE
Qty: 0 | Refills: 0 | Status: COMPLETED | OUTPATIENT
Start: 2018-01-01 | End: 2018-01-01

## 2018-01-01 RX ORDER — FOLIC ACID 0.8 MG
1 TABLET ORAL DAILY
Qty: 0 | Refills: 0 | Status: DISCONTINUED | OUTPATIENT
Start: 2018-01-01 | End: 2018-01-01

## 2018-01-01 RX ORDER — MORPHINE SULFATE 50 MG/1
1 CAPSULE, EXTENDED RELEASE ORAL ONCE
Qty: 0 | Refills: 0 | Status: DISCONTINUED | OUTPATIENT
Start: 2018-01-01 | End: 2018-01-01

## 2018-01-01 RX ORDER — CHLORHEXIDINE GLUCONATE 213 G/1000ML
1 SOLUTION TOPICAL DAILY
Qty: 0 | Refills: 0 | Status: DISCONTINUED | OUTPATIENT
Start: 2018-01-01 | End: 2018-01-01

## 2018-01-01 RX ORDER — AZITHROMYCIN 500 MG/1
500 TABLET, FILM COATED ORAL DAILY
Qty: 0 | Refills: 0 | Status: COMPLETED | OUTPATIENT
Start: 2018-01-01 | End: 2018-01-01

## 2018-01-01 RX ORDER — ALBUTEROL 90 UG/1
2.5 AEROSOL, METERED ORAL
Qty: 0 | Refills: 0 | Status: DISCONTINUED | OUTPATIENT
Start: 2018-01-01 | End: 2018-01-01

## 2018-01-01 RX ORDER — ATORVASTATIN CALCIUM 80 MG/1
1 TABLET, FILM COATED ORAL
Qty: 0 | Refills: 0 | COMMUNITY

## 2018-01-01 RX ORDER — INSULIN HUMAN 100 [IU]/ML
10 INJECTION, SOLUTION SUBCUTANEOUS ONCE
Qty: 0 | Refills: 0 | Status: COMPLETED | OUTPATIENT
Start: 2018-01-01 | End: 2018-01-01

## 2018-01-01 RX ORDER — IPRATROPIUM/ALBUTEROL SULFATE 18-103MCG
3 AEROSOL WITH ADAPTER (GRAM) INHALATION ONCE
Qty: 0 | Refills: 0 | Status: COMPLETED | OUTPATIENT
Start: 2018-01-01 | End: 2018-01-01

## 2018-01-01 RX ORDER — CALCIUM GLUCONATE 100 MG/ML
1 VIAL (ML) INTRAVENOUS ONCE
Qty: 0 | Refills: 0 | Status: COMPLETED | OUTPATIENT
Start: 2018-01-01 | End: 2018-01-01

## 2018-01-01 RX ORDER — LEVOTHYROXINE SODIUM 125 MCG
1 TABLET ORAL
Qty: 0 | Refills: 0 | COMMUNITY

## 2018-01-01 RX ORDER — ACETAMINOPHEN 500 MG
1000 TABLET ORAL ONCE
Qty: 0 | Refills: 0 | Status: COMPLETED | OUTPATIENT
Start: 2018-01-01 | End: 2018-01-01

## 2018-01-01 RX ORDER — SODIUM POLYSTYRENE SULFONATE 4.1 MEQ/G
30 POWDER, FOR SUSPENSION ORAL ONCE
Qty: 0 | Refills: 0 | Status: COMPLETED | OUTPATIENT
Start: 2018-01-01 | End: 2018-01-01

## 2018-01-01 RX ORDER — HYDROMORPHONE HYDROCHLORIDE 2 MG/ML
1 INJECTION INTRAMUSCULAR; INTRAVENOUS; SUBCUTANEOUS ONCE
Qty: 0 | Refills: 0 | Status: DISCONTINUED | OUTPATIENT
Start: 2018-01-01 | End: 2018-01-01

## 2018-01-01 RX ORDER — LEVOTHYROXINE SODIUM 125 MCG
25 TABLET ORAL AT BEDTIME
Qty: 0 | Refills: 0 | Status: DISCONTINUED | OUTPATIENT
Start: 2018-01-01 | End: 2018-01-01

## 2018-01-01 RX ORDER — FOLIC ACID 0.8 MG
1 TABLET ORAL
Qty: 0 | Refills: 0 | COMMUNITY

## 2018-01-01 RX ORDER — LEVOTHYROXINE SODIUM 125 MCG
50 TABLET ORAL DAILY
Qty: 0 | Refills: 0 | Status: DISCONTINUED | OUTPATIENT
Start: 2018-01-01 | End: 2018-01-01

## 2018-01-01 RX ORDER — FUROSEMIDE 40 MG
20 TABLET ORAL ONCE
Qty: 0 | Refills: 0 | Status: COMPLETED | OUTPATIENT
Start: 2018-01-01 | End: 2018-01-01

## 2018-01-01 RX ORDER — AMLODIPINE BESYLATE 2.5 MG/1
1 TABLET ORAL
Qty: 0 | Refills: 0 | COMMUNITY

## 2018-01-01 RX ORDER — INSULIN GLARGINE 100 [IU]/ML
5 INJECTION, SOLUTION SUBCUTANEOUS AT BEDTIME
Qty: 0 | Refills: 0 | Status: DISCONTINUED | OUTPATIENT
Start: 2018-01-01 | End: 2018-01-01

## 2018-01-01 RX ORDER — FUROSEMIDE 40 MG
40 TABLET ORAL ONCE
Qty: 0 | Refills: 0 | Status: DISCONTINUED | OUTPATIENT
Start: 2018-01-01 | End: 2018-01-01

## 2018-01-01 RX ORDER — HEPARIN SODIUM 5000 [USP'U]/ML
5000 INJECTION INTRAVENOUS; SUBCUTANEOUS EVERY 12 HOURS
Qty: 0 | Refills: 0 | Status: DISCONTINUED | OUTPATIENT
Start: 2018-01-01 | End: 2018-01-01

## 2018-01-01 RX ORDER — NYSTATIN CREAM 100000 [USP'U]/G
1 CREAM TOPICAL DAILY
Qty: 0 | Refills: 0 | Status: DISCONTINUED | OUTPATIENT
Start: 2018-01-01 | End: 2018-01-01

## 2018-01-01 RX ORDER — SODIUM POLYSTYRENE SULFONATE 4.1 MEQ/G
15 POWDER, FOR SUSPENSION ORAL ONCE
Qty: 0 | Refills: 0 | Status: COMPLETED | OUTPATIENT
Start: 2018-01-01 | End: 2018-01-01

## 2018-01-01 RX ORDER — VANCOMYCIN HCL 1 G
VIAL (EA) INTRAVENOUS
Qty: 0 | Refills: 0 | Status: DISCONTINUED | OUTPATIENT
Start: 2018-01-01 | End: 2018-01-01

## 2018-01-01 RX ORDER — CEFTRIAXONE 500 MG/1
1 INJECTION, POWDER, FOR SOLUTION INTRAMUSCULAR; INTRAVENOUS ONCE
Qty: 0 | Refills: 0 | Status: COMPLETED | OUTPATIENT
Start: 2018-01-01 | End: 2018-01-01

## 2018-01-01 RX ORDER — ASPIRIN/CALCIUM CARB/MAGNESIUM 324 MG
325 TABLET ORAL ONCE
Qty: 0 | Refills: 0 | Status: COMPLETED | OUTPATIENT
Start: 2018-01-01 | End: 2018-01-01

## 2018-01-01 RX ORDER — POLYETHYLENE GLYCOL 3350 17 G/17G
17 POWDER, FOR SOLUTION ORAL ONCE
Qty: 0 | Refills: 0 | Status: COMPLETED | OUTPATIENT
Start: 2018-01-01 | End: 2018-01-01

## 2018-01-01 RX ORDER — HYDROMORPHONE HYDROCHLORIDE 2 MG/ML
0.5 INJECTION INTRAMUSCULAR; INTRAVENOUS; SUBCUTANEOUS EVERY 6 HOURS
Qty: 0 | Refills: 0 | Status: DISCONTINUED | OUTPATIENT
Start: 2018-01-01 | End: 2018-01-01

## 2018-01-01 RX ORDER — AMLODIPINE BESYLATE 2.5 MG/1
2.5 TABLET ORAL DAILY
Qty: 0 | Refills: 0 | Status: DISCONTINUED | OUTPATIENT
Start: 2018-01-01 | End: 2018-01-01

## 2018-01-01 RX ORDER — ERYTHROPOIETIN 10000 [IU]/ML
3000 INJECTION, SOLUTION INTRAVENOUS; SUBCUTANEOUS
Qty: 0 | Refills: 0 | Status: DISCONTINUED | OUTPATIENT
Start: 2018-01-01 | End: 2018-01-01

## 2018-01-01 RX ORDER — FUROSEMIDE 40 MG
1 TABLET ORAL
Qty: 0 | Refills: 0 | COMMUNITY

## 2018-01-01 RX ORDER — PIPERACILLIN AND TAZOBACTAM 4; .5 G/20ML; G/20ML
3.38 INJECTION, POWDER, LYOPHILIZED, FOR SOLUTION INTRAVENOUS EVERY 12 HOURS
Qty: 0 | Refills: 0 | Status: DISCONTINUED | OUTPATIENT
Start: 2018-01-01 | End: 2018-01-01

## 2018-01-01 RX ORDER — DESMOPRESSIN ACETATE 0.1 MG/1
20 TABLET ORAL ONCE
Qty: 0 | Refills: 0 | Status: COMPLETED | OUTPATIENT
Start: 2018-01-01 | End: 2018-01-01

## 2018-01-01 RX ORDER — SEVELAMER CARBONATE 2400 MG/1
800 POWDER, FOR SUSPENSION ORAL
Qty: 0 | Refills: 0 | Status: DISCONTINUED | OUTPATIENT
Start: 2018-01-01 | End: 2018-01-01

## 2018-01-01 RX ORDER — IRON SUCROSE 20 MG/ML
100 INJECTION, SOLUTION INTRAVENOUS
Qty: 0 | Refills: 0 | Status: DISCONTINUED | OUTPATIENT
Start: 2018-01-01 | End: 2018-01-01

## 2018-01-01 RX ORDER — ATORVASTATIN CALCIUM 80 MG/1
20 TABLET, FILM COATED ORAL AT BEDTIME
Qty: 0 | Refills: 0 | Status: DISCONTINUED | OUTPATIENT
Start: 2018-01-01 | End: 2018-01-01

## 2018-01-01 RX ORDER — IPRATROPIUM/ALBUTEROL SULFATE 18-103MCG
3 AEROSOL WITH ADAPTER (GRAM) INHALATION
Qty: 0 | Refills: 0 | Status: COMPLETED | OUTPATIENT
Start: 2018-01-01 | End: 2018-01-01

## 2018-01-01 RX ORDER — ROBINUL 0.2 MG/ML
0.2 INJECTION INTRAMUSCULAR; INTRAVENOUS EVERY 6 HOURS
Qty: 0 | Refills: 0 | Status: DISCONTINUED | OUTPATIENT
Start: 2018-01-01 | End: 2018-01-01

## 2018-01-01 RX ORDER — ONDANSETRON 8 MG/1
4 TABLET, FILM COATED ORAL ONCE
Qty: 0 | Refills: 0 | Status: COMPLETED | OUTPATIENT
Start: 2018-01-01 | End: 2018-01-01

## 2018-01-01 RX ORDER — FUROSEMIDE 40 MG
60 TABLET ORAL ONCE
Qty: 0 | Refills: 0 | Status: COMPLETED | OUTPATIENT
Start: 2018-01-01 | End: 2018-01-01

## 2018-01-01 RX ORDER — ASPIRIN/CALCIUM CARB/MAGNESIUM 324 MG
81 TABLET ORAL DAILY
Qty: 0 | Refills: 0 | Status: DISCONTINUED | OUTPATIENT
Start: 2018-01-01 | End: 2018-01-01

## 2018-01-01 RX ORDER — DEXTROSE 50 % IN WATER 50 %
25 SYRINGE (ML) INTRAVENOUS ONCE
Qty: 0 | Refills: 0 | Status: COMPLETED | OUTPATIENT
Start: 2018-01-01 | End: 2018-01-01

## 2018-01-01 RX ORDER — PHENYLEPHRINE HYDROCHLORIDE 10 MG/ML
0.1 INJECTION INTRAVENOUS
Qty: 160 | Refills: 0 | Status: DISCONTINUED | OUTPATIENT
Start: 2018-01-01 | End: 2018-01-01

## 2018-01-01 RX ORDER — CEFTRIAXONE 500 MG/1
1 INJECTION, POWDER, FOR SOLUTION INTRAMUSCULAR; INTRAVENOUS EVERY 24 HOURS
Qty: 0 | Refills: 0 | Status: DISCONTINUED | OUTPATIENT
Start: 2018-01-01 | End: 2018-01-01

## 2018-01-01 RX ORDER — SODIUM POLYSTYRENE SULFONATE 4.1 MEQ/G
15 POWDER, FOR SUSPENSION ORAL ONCE
Qty: 0 | Refills: 0 | Status: DISCONTINUED | OUTPATIENT
Start: 2018-01-01 | End: 2018-01-01

## 2018-01-01 RX ORDER — INSULIN GLARGINE 100 [IU]/ML
10 INJECTION, SOLUTION SUBCUTANEOUS EVERY MORNING
Qty: 0 | Refills: 0 | Status: DISCONTINUED | OUTPATIENT
Start: 2018-01-01 | End: 2018-01-01

## 2018-01-01 RX ORDER — PHYTONADIONE (VIT K1) 5 MG
5 TABLET ORAL ONCE
Qty: 0 | Refills: 0 | Status: COMPLETED | OUTPATIENT
Start: 2018-01-01 | End: 2018-01-01

## 2018-01-01 RX ORDER — IPRATROPIUM/ALBUTEROL SULFATE 18-103MCG
3 AEROSOL WITH ADAPTER (GRAM) INHALATION EVERY 6 HOURS
Qty: 0 | Refills: 0 | Status: DISCONTINUED | OUTPATIENT
Start: 2018-01-01 | End: 2018-01-01

## 2018-01-01 RX ORDER — VANCOMYCIN HCL 1 G
1000 VIAL (EA) INTRAVENOUS ONCE
Qty: 0 | Refills: 0 | Status: COMPLETED | OUTPATIENT
Start: 2018-01-01 | End: 2018-01-01

## 2018-01-01 RX ADMIN — SEVELAMER CARBONATE 800 MILLIGRAM(S): 2400 POWDER, FOR SUSPENSION ORAL at 08:50

## 2018-01-01 RX ADMIN — HEPARIN SODIUM 5000 UNIT(S): 5000 INJECTION INTRAVENOUS; SUBCUTANEOUS at 05:24

## 2018-01-01 RX ADMIN — Medication 3 MILLILITER(S): at 12:00

## 2018-01-01 RX ADMIN — SODIUM POLYSTYRENE SULFONATE 30 GRAM(S): 4.1 POWDER, FOR SUSPENSION ORAL at 01:02

## 2018-01-01 RX ADMIN — Medication 20 MILLIGRAM(S): at 12:08

## 2018-01-01 RX ADMIN — Medication 2: at 12:24

## 2018-01-01 RX ADMIN — Medication 3 MILLILITER(S): at 09:51

## 2018-01-01 RX ADMIN — Medication 3 MILLILITER(S): at 09:18

## 2018-01-01 RX ADMIN — Medication 40 MILLIGRAM(S): at 17:49

## 2018-01-01 RX ADMIN — Medication 40 MILLIGRAM(S): at 17:18

## 2018-01-01 RX ADMIN — Medication 3 MILLILITER(S): at 21:28

## 2018-01-01 RX ADMIN — Medication 40 MILLIGRAM(S): at 05:50

## 2018-01-01 RX ADMIN — Medication 81 MILLIGRAM(S): at 12:01

## 2018-01-01 RX ADMIN — IRON SUCROSE 210 MILLIGRAM(S): 20 INJECTION, SOLUTION INTRAVENOUS at 13:36

## 2018-01-01 RX ADMIN — Medication 3 MILLILITER(S): at 09:27

## 2018-01-01 RX ADMIN — MORPHINE SULFATE 1 MILLIGRAM(S): 50 CAPSULE, EXTENDED RELEASE ORAL at 21:28

## 2018-01-01 RX ADMIN — Medication 2: at 12:06

## 2018-01-01 RX ADMIN — Medication 3 MILLILITER(S): at 14:48

## 2018-01-01 RX ADMIN — Medication 50 MICROGRAM(S): at 05:24

## 2018-01-01 RX ADMIN — Medication 1: at 12:05

## 2018-01-01 RX ADMIN — SEVELAMER CARBONATE 800 MILLIGRAM(S): 2400 POWDER, FOR SUSPENSION ORAL at 08:10

## 2018-01-01 RX ADMIN — HEPARIN SODIUM 5000 UNIT(S): 5000 INJECTION INTRAVENOUS; SUBCUTANEOUS at 05:07

## 2018-01-01 RX ADMIN — Medication 50 MICROGRAM(S): at 05:07

## 2018-01-01 RX ADMIN — Medication 3 MILLILITER(S): at 15:52

## 2018-01-01 RX ADMIN — Medication 50 MICROGRAM(S): at 05:50

## 2018-01-01 RX ADMIN — ATORVASTATIN CALCIUM 20 MILLIGRAM(S): 80 TABLET, FILM COATED ORAL at 21:07

## 2018-01-01 RX ADMIN — HEPARIN SODIUM 5000 UNIT(S): 5000 INJECTION INTRAVENOUS; SUBCUTANEOUS at 05:03

## 2018-01-01 RX ADMIN — Medication 3 MILLILITER(S): at 09:05

## 2018-01-01 RX ADMIN — Medication 1 MILLIGRAM(S): at 11:52

## 2018-01-01 RX ADMIN — INSULIN GLARGINE 5 UNIT(S): 100 INJECTION, SOLUTION SUBCUTANEOUS at 21:13

## 2018-01-01 RX ADMIN — Medication 250 MILLIGRAM(S): at 02:03

## 2018-01-01 RX ADMIN — Medication 81 MILLIGRAM(S): at 12:07

## 2018-01-01 RX ADMIN — CEFTRIAXONE 100 GRAM(S): 500 INJECTION, POWDER, FOR SOLUTION INTRAMUSCULAR; INTRAVENOUS at 05:30

## 2018-01-01 RX ADMIN — ALBUTEROL 2.5 MILLIGRAM(S): 90 AEROSOL, METERED ORAL at 06:12

## 2018-01-01 RX ADMIN — AMLODIPINE BESYLATE 2.5 MILLIGRAM(S): 2.5 TABLET ORAL at 05:29

## 2018-01-01 RX ADMIN — AZITHROMYCIN 500 MILLIGRAM(S): 500 TABLET, FILM COATED ORAL at 11:52

## 2018-01-01 RX ADMIN — POLYETHYLENE GLYCOL 3350 17 GRAM(S): 17 POWDER, FOR SOLUTION ORAL at 17:22

## 2018-01-01 RX ADMIN — SEVELAMER CARBONATE 800 MILLIGRAM(S): 2400 POWDER, FOR SUSPENSION ORAL at 17:16

## 2018-01-01 RX ADMIN — AZITHROMYCIN 500 MILLIGRAM(S): 500 TABLET, FILM COATED ORAL at 12:05

## 2018-01-01 RX ADMIN — POLYETHYLENE GLYCOL 3350 17 GRAM(S): 17 POWDER, FOR SOLUTION ORAL at 19:44

## 2018-01-01 RX ADMIN — INSULIN GLARGINE 5 UNIT(S): 100 INJECTION, SOLUTION SUBCUTANEOUS at 21:25

## 2018-01-01 RX ADMIN — Medication 325 MILLIGRAM(S): at 13:00

## 2018-01-01 RX ADMIN — Medication 1 MILLIGRAM(S): at 19:15

## 2018-01-01 RX ADMIN — Medication 20 MILLIGRAM(S): at 22:54

## 2018-01-01 RX ADMIN — AZITHROMYCIN 250 MILLIGRAM(S): 500 TABLET, FILM COATED ORAL at 12:23

## 2018-01-01 RX ADMIN — Medication 3 MILLILITER(S): at 12:30

## 2018-01-01 RX ADMIN — Medication 3 MILLILITER(S): at 09:39

## 2018-01-01 RX ADMIN — INSULIN GLARGINE 10 UNIT(S): 100 INJECTION, SOLUTION SUBCUTANEOUS at 08:10

## 2018-01-01 RX ADMIN — Medication 200 GRAM(S): at 23:50

## 2018-01-01 RX ADMIN — Medication 40 MILLIGRAM(S): at 19:45

## 2018-01-01 RX ADMIN — Medication 40 MILLIGRAM(S): at 21:13

## 2018-01-01 RX ADMIN — Medication 3 MILLILITER(S): at 14:15

## 2018-01-01 RX ADMIN — CEFTRIAXONE 100 GRAM(S): 500 INJECTION, POWDER, FOR SOLUTION INTRAMUSCULAR; INTRAVENOUS at 05:12

## 2018-01-01 RX ADMIN — HEPARIN SODIUM 5000 UNIT(S): 5000 INJECTION INTRAVENOUS; SUBCUTANEOUS at 05:13

## 2018-01-01 RX ADMIN — ROBINUL 0.2 MILLIGRAM(S): 0.2 INJECTION INTRAMUSCULAR; INTRAVENOUS at 11:40

## 2018-01-01 RX ADMIN — Medication 40 MILLIGRAM(S): at 19:15

## 2018-01-01 RX ADMIN — Medication 50 MICROGRAM(S): at 05:14

## 2018-01-01 RX ADMIN — INSULIN GLARGINE 10 UNIT(S): 100 INJECTION, SOLUTION SUBCUTANEOUS at 12:22

## 2018-01-01 RX ADMIN — Medication 1 MILLIGRAM(S): at 12:14

## 2018-01-01 RX ADMIN — Medication 100 MILLIGRAM(S): at 05:05

## 2018-01-01 RX ADMIN — Medication 15 MILLIGRAM(S): at 06:06

## 2018-01-01 RX ADMIN — Medication 40 MILLIGRAM(S): at 05:29

## 2018-01-01 RX ADMIN — Medication 3 MILLILITER(S): at 21:09

## 2018-01-01 RX ADMIN — INSULIN GLARGINE 10 UNIT(S): 100 INJECTION, SOLUTION SUBCUTANEOUS at 08:50

## 2018-01-01 RX ADMIN — SEVELAMER CARBONATE 800 MILLIGRAM(S): 2400 POWDER, FOR SUSPENSION ORAL at 18:51

## 2018-01-01 RX ADMIN — Medication 40 MILLIGRAM(S): at 05:04

## 2018-01-01 RX ADMIN — CEFTRIAXONE 100 GRAM(S): 500 INJECTION, POWDER, FOR SOLUTION INTRAMUSCULAR; INTRAVENOUS at 06:00

## 2018-01-01 RX ADMIN — Medication 40 MILLIGRAM(S): at 17:16

## 2018-01-01 RX ADMIN — Medication 81 MILLIGRAM(S): at 12:05

## 2018-01-01 RX ADMIN — Medication 3 MILLILITER(S): at 04:26

## 2018-01-01 RX ADMIN — Medication 100 MILLIGRAM(S): at 21:50

## 2018-01-01 RX ADMIN — Medication 3 MILLILITER(S): at 19:44

## 2018-01-01 RX ADMIN — NYSTATIN CREAM 1 APPLICATION(S): 100000 CREAM TOPICAL at 13:40

## 2018-01-01 RX ADMIN — HEPARIN SODIUM 5000 UNIT(S): 5000 INJECTION INTRAVENOUS; SUBCUTANEOUS at 18:51

## 2018-01-01 RX ADMIN — AZITHROMYCIN 250 MILLIGRAM(S): 500 TABLET, FILM COATED ORAL at 12:08

## 2018-01-01 RX ADMIN — Medication 81 MILLIGRAM(S): at 12:21

## 2018-01-01 RX ADMIN — SEVELAMER CARBONATE 800 MILLIGRAM(S): 2400 POWDER, FOR SUSPENSION ORAL at 13:40

## 2018-01-01 RX ADMIN — Medication 1 MILLIGRAM(S): at 12:05

## 2018-01-01 RX ADMIN — CEFTRIAXONE 100 GRAM(S): 500 INJECTION, POWDER, FOR SOLUTION INTRAMUSCULAR; INTRAVENOUS at 12:09

## 2018-01-01 RX ADMIN — SEVELAMER CARBONATE 800 MILLIGRAM(S): 2400 POWDER, FOR SUSPENSION ORAL at 12:05

## 2018-01-01 RX ADMIN — Medication 40 MILLIGRAM(S): at 05:07

## 2018-01-01 RX ADMIN — Medication 25 GRAM(S): at 23:57

## 2018-01-01 RX ADMIN — Medication 50 MICROGRAM(S): at 05:29

## 2018-01-01 RX ADMIN — Medication 100 MILLIGRAM(S): at 00:47

## 2018-01-01 RX ADMIN — HEPARIN SODIUM 5000 UNIT(S): 5000 INJECTION INTRAVENOUS; SUBCUTANEOUS at 18:16

## 2018-01-01 RX ADMIN — Medication 3 MILLILITER(S): at 20:40

## 2018-01-01 RX ADMIN — HYDROMORPHONE HYDROCHLORIDE 0.5 MILLIGRAM(S): 2 INJECTION INTRAMUSCULAR; INTRAVENOUS; SUBCUTANEOUS at 12:00

## 2018-01-01 RX ADMIN — Medication 60 MILLIGRAM(S): at 12:26

## 2018-01-01 RX ADMIN — IRON SUCROSE 210 MILLIGRAM(S): 20 INJECTION, SOLUTION INTRAVENOUS at 14:35

## 2018-01-01 RX ADMIN — ERYTHROPOIETIN 3000 UNIT(S): 10000 INJECTION, SOLUTION INTRAVENOUS; SUBCUTANEOUS at 11:13

## 2018-01-01 RX ADMIN — Medication 2: at 16:40

## 2018-01-01 RX ADMIN — Medication 60 MILLIGRAM(S): at 11:12

## 2018-01-01 RX ADMIN — HEPARIN SODIUM 5000 UNIT(S): 5000 INJECTION INTRAVENOUS; SUBCUTANEOUS at 05:29

## 2018-01-01 RX ADMIN — Medication 3 MILLILITER(S): at 20:32

## 2018-01-01 RX ADMIN — Medication 40 MILLIGRAM(S): at 05:24

## 2018-01-01 RX ADMIN — Medication 40 MILLIGRAM(S): at 15:05

## 2018-01-01 RX ADMIN — Medication 15 MILLIGRAM(S): at 03:55

## 2018-01-01 RX ADMIN — Medication 3 MILLILITER(S): at 21:40

## 2018-01-01 RX ADMIN — Medication 50 MICROGRAM(S): at 05:03

## 2018-01-01 RX ADMIN — Medication 125 MILLIGRAM(S): at 12:09

## 2018-01-01 RX ADMIN — INSULIN GLARGINE 5 UNIT(S): 100 INJECTION, SOLUTION SUBCUTANEOUS at 21:50

## 2018-01-01 RX ADMIN — INSULIN HUMAN 10 UNIT(S): 100 INJECTION, SOLUTION SUBCUTANEOUS at 00:58

## 2018-01-01 RX ADMIN — Medication 3 MILLILITER(S): at 03:32

## 2018-01-01 RX ADMIN — Medication 2: at 16:41

## 2018-01-01 RX ADMIN — INSULIN GLARGINE 5 UNIT(S): 100 INJECTION, SOLUTION SUBCUTANEOUS at 19:45

## 2018-01-01 RX ADMIN — Medication 40 MILLIGRAM(S): at 05:13

## 2018-01-01 RX ADMIN — Medication 101 MILLIGRAM(S): at 12:40

## 2018-01-01 RX ADMIN — Medication 50 MICROGRAM(S): at 19:15

## 2018-01-01 RX ADMIN — Medication 3 MILLILITER(S): at 08:47

## 2018-01-01 RX ADMIN — Medication 3 MILLILITER(S): at 14:06

## 2018-01-01 RX ADMIN — CEFTRIAXONE 100 GRAM(S): 500 INJECTION, POWDER, FOR SOLUTION INTRAMUSCULAR; INTRAVENOUS at 05:02

## 2018-01-01 RX ADMIN — HYDROMORPHONE HYDROCHLORIDE 1 MILLIGRAM(S): 2 INJECTION INTRAMUSCULAR; INTRAVENOUS; SUBCUTANEOUS at 08:00

## 2018-01-01 RX ADMIN — ATORVASTATIN CALCIUM 20 MILLIGRAM(S): 80 TABLET, FILM COATED ORAL at 19:45

## 2018-01-01 RX ADMIN — Medication 40 MILLIGRAM(S): at 18:51

## 2018-01-01 RX ADMIN — Medication 3 MILLILITER(S): at 20:24

## 2018-01-01 RX ADMIN — PIPERACILLIN AND TAZOBACTAM 25 GRAM(S): 4; .5 INJECTION, POWDER, LYOPHILIZED, FOR SOLUTION INTRAVENOUS at 13:36

## 2018-01-01 RX ADMIN — ATORVASTATIN CALCIUM 20 MILLIGRAM(S): 80 TABLET, FILM COATED ORAL at 22:06

## 2018-01-01 RX ADMIN — INSULIN GLARGINE 10 UNIT(S): 100 INJECTION, SOLUTION SUBCUTANEOUS at 09:08

## 2018-01-01 RX ADMIN — Medication 1 MILLIGRAM(S): at 12:07

## 2018-01-01 RX ADMIN — Medication 3 MILLILITER(S): at 00:16

## 2018-01-01 RX ADMIN — ATORVASTATIN CALCIUM 20 MILLIGRAM(S): 80 TABLET, FILM COATED ORAL at 21:13

## 2018-01-01 RX ADMIN — SODIUM POLYSTYRENE SULFONATE 15 GRAM(S): 4.1 POWDER, FOR SUSPENSION ORAL at 19:12

## 2018-01-01 RX ADMIN — Medication 3 MILLILITER(S): at 15:18

## 2018-01-01 RX ADMIN — SEVELAMER CARBONATE 800 MILLIGRAM(S): 2400 POWDER, FOR SUSPENSION ORAL at 17:49

## 2018-01-01 RX ADMIN — DESMOPRESSIN ACETATE 220 MICROGRAM(S): 0.1 TABLET ORAL at 03:38

## 2018-01-01 RX ADMIN — Medication 1: at 16:15

## 2018-01-01 RX ADMIN — PIPERACILLIN AND TAZOBACTAM 25 GRAM(S): 4; .5 INJECTION, POWDER, LYOPHILIZED, FOR SOLUTION INTRAVENOUS at 17:04

## 2018-01-01 RX ADMIN — HEPARIN SODIUM 5000 UNIT(S): 5000 INJECTION INTRAVENOUS; SUBCUTANEOUS at 19:15

## 2018-01-01 RX ADMIN — Medication 3 MILLILITER(S): at 08:32

## 2018-01-01 RX ADMIN — AZITHROMYCIN 500 MILLIGRAM(S): 500 TABLET, FILM COATED ORAL at 12:01

## 2018-01-01 RX ADMIN — Medication 1 MILLIGRAM(S): at 12:01

## 2018-01-01 RX ADMIN — SEVELAMER CARBONATE 800 MILLIGRAM(S): 2400 POWDER, FOR SUSPENSION ORAL at 16:47

## 2018-01-01 RX ADMIN — Medication 1 MILLIGRAM(S): at 12:23

## 2018-01-01 RX ADMIN — IRON SUCROSE 210 MILLIGRAM(S): 20 INJECTION, SOLUTION INTRAVENOUS at 14:46

## 2018-01-01 RX ADMIN — Medication 1000 MILLIGRAM(S): at 03:22

## 2018-01-01 RX ADMIN — ATORVASTATIN CALCIUM 20 MILLIGRAM(S): 80 TABLET, FILM COATED ORAL at 21:49

## 2018-01-01 RX ADMIN — ATORVASTATIN CALCIUM 20 MILLIGRAM(S): 80 TABLET, FILM COATED ORAL at 21:50

## 2018-01-01 RX ADMIN — Medication 3 MILLILITER(S): at 16:05

## 2018-01-01 RX ADMIN — CEFTRIAXONE 100 GRAM(S): 500 INJECTION, POWDER, FOR SOLUTION INTRAMUSCULAR; INTRAVENOUS at 05:24

## 2018-01-01 RX ADMIN — HEPARIN SODIUM 5000 UNIT(S): 5000 INJECTION INTRAVENOUS; SUBCUTANEOUS at 17:18

## 2018-01-01 RX ADMIN — Medication 81 MILLIGRAM(S): at 11:52

## 2018-01-01 RX ADMIN — SEVELAMER CARBONATE 800 MILLIGRAM(S): 2400 POWDER, FOR SUSPENSION ORAL at 09:20

## 2018-01-01 RX ADMIN — ONDANSETRON 4 MILLIGRAM(S): 8 TABLET, FILM COATED ORAL at 13:12

## 2018-01-01 RX ADMIN — Medication 1: at 12:00

## 2018-01-01 RX ADMIN — SEVELAMER CARBONATE 800 MILLIGRAM(S): 2400 POWDER, FOR SUSPENSION ORAL at 12:01

## 2018-01-01 RX ADMIN — NYSTATIN CREAM 1 APPLICATION(S): 100000 CREAM TOPICAL at 11:54

## 2018-01-01 RX ADMIN — Medication 400 MILLIGRAM(S): at 01:30

## 2018-01-01 RX ADMIN — HYDROMORPHONE HYDROCHLORIDE 0.5 MILLIGRAM(S): 2 INJECTION INTRAMUSCULAR; INTRAVENOUS; SUBCUTANEOUS at 11:40

## 2018-01-01 RX ADMIN — Medication 1: at 17:15

## 2018-01-01 RX ADMIN — Medication 3 MILLILITER(S): at 05:29

## 2018-01-01 RX ADMIN — PIPERACILLIN AND TAZOBACTAM 25 GRAM(S): 4; .5 INJECTION, POWDER, LYOPHILIZED, FOR SOLUTION INTRAVENOUS at 05:50

## 2018-01-01 RX ADMIN — INSULIN GLARGINE 5 UNIT(S): 100 INJECTION, SOLUTION SUBCUTANEOUS at 21:49

## 2018-01-01 RX ADMIN — HYDROMORPHONE HYDROCHLORIDE 1 MILLIGRAM(S): 2 INJECTION INTRAMUSCULAR; INTRAVENOUS; SUBCUTANEOUS at 07:46

## 2018-01-01 RX ADMIN — HEPARIN SODIUM 5000 UNIT(S): 5000 INJECTION INTRAVENOUS; SUBCUTANEOUS at 17:49

## 2018-01-01 RX ADMIN — HEPARIN SODIUM 5000 UNIT(S): 5000 INJECTION INTRAVENOUS; SUBCUTANEOUS at 17:16

## 2018-03-04 NOTE — ED ADULT NURSE REASSESSMENT NOTE - NS ED NURSE REASSESS COMMENT FT1
Patient remains hemodynamically stable and with no verbal complaints. Labs collected and sent. Family member continues by bedside.
Pt received from THUAN Dumont. Pt is Divehi speaking, daughter by the bedside. As per the daughter pt is comfortable, pt denies any chest pain. Pt complains of cough at times. Pt in Telemonitoring box E. Pt has redness noted on on the buttocks. Pt not in any acute distress.
Received Pt alert and verbally responsive appearing not distress attached to box E medicated as ordered endorsed to Ashley LAROSE safety maintained

## 2018-03-04 NOTE — ED ADULT NURSE NOTE - OBJECTIVE STATEMENT
pt a&ox3, BIBEMS with daughter at bedside here for SOB. daughter states cough and SOB since yesterday. hx of asthma. no wheezing. ambulatory at baseline with walker.

## 2018-03-04 NOTE — H&P ADULT - ASSESSMENT
89 y/o F from home with HHA  walks with walker w/ PMHx of CHF unknown EF, CKD, asthma, anemia,  hypothyroid, and HTN BIB EMS for SOB,  productive cough and fever.  As per daughter pt started with a mild cough 3 days ago and then last night she had a temp (100.1). Daughter also states that yesterday  her LE started to become edematous. As per EMS pt started wheezing after being moved into the ambulance, however, wheezing improved with Duoneb treatment given by EMS. 89 y/o F from home with HHA  walks with walker w/ PMHx of CHF unknown EF, CKD ( as per daughter one of her kidneys is not very functional), asthma, anemia,  hypothyroid, and HTN BIB EMS for SOB,  productive cough and fever.  As per daughter pt started with a mild cough 3 days ago and then last night she had a temp (100.1). Daughter also states that yesterday  her LE started to have pedal edema which is far from baseline.. As per EMS pt started wheezing after being moved into the ambulance, however, wheezing improved with Duoneb treatment given by EMS.

## 2018-03-04 NOTE — ED PROVIDER NOTE - MEDICAL DECISION MAKING DETAILS
SOB with low-grade temp. Noted PNA on CXR. Also wheezing c/w asthma/COPD with h/o albuterol use. Also noted LE swelling with JVD and h/o CHF c/w CHF exacerbation. LE equivocal for asymmetric swelling, U/S _____ and PE considered less likely with above associated etiologies noted. Given Duoenbs/solumedrol, Lasix, ceftriaxone/azith. SOB with low-grade temp. Noted PNA on CXR. Also wheezing c/w asthma/COPD with h/o albuterol use. Also noted LE swelling with JVD and h/o CHF c/w CHF exacerbation. LE equivocal for asymmetric swelling, U/S _____ and PE considered less likely with above associated etiologies noted. Also noted anemia, no rectal or guaiac blood. Given Duonebs/solumedrol, Lasix, ceftriaxone/azith. SOB with low-grade temp. Noted PNA on CXR. Also wheezing c/w asthma/COPD with h/o albuterol use. Also noted LE swelling with JVD and h/o CHF c/w CHF exacerbation. LE equivocal for asymmetric swelling, U/S pending and PE considered less likely with above associated etiologies noted. Also noted anemia, no rectal or guaiac blood. Given Duonebs/solumedrol, Lasix, ceftriaxone/azith. Well appearing, hemodynamically stable. Admitted to internal medicine for further monitoring, w/u, and care.

## 2018-03-04 NOTE — H&P ADULT - PROBLEM SELECTOR PLAN 1
leukocytosis, fevers, productive cough and consolidation in CXR  [id number]  will start IV abx leukocytosis, fevers, productive cough and consolidation in CXR  [id number]=3  will start IV abx  f/u RVP   procalcitonin sent

## 2018-03-04 NOTE — H&P ADULT - PROBLEM SELECTOR PLAN 4
Pt has hx of kidney disease   pt presents with hyperkalemia of 5.5 w/ no peak t waves   Kayexalate x 1 was given   monitor BMP  Nephro: Dr Moore Pt has hx of kidney disease   pt presents with hyperkalemia of 5.5 w/ no peak t waves   Kayexalate x 1 was given   monitor BMP  f/u urinelytes  Nephro: Dr Moore Pt has hx of kidney disease    as per daughter one of her kidneys is not very functional  pt presents with hyperkalemia of 5.5 w/ no peak t waves   Kayexalate x 1 was given   monitor BMP  f/u urinelytes  Nephro: Dr Moore

## 2018-03-04 NOTE — H&P ADULT - PROBLEM SELECTOR PLAN 3
pt with worsening LE pedal edema  and JVD  EKG with Sinus lianet @ 57 with junctional rhythm. pt was offered peacemaker in the past but was declined  due to age   BNP 8k  pt Is on 20 Lasix  at home,  will start 20 IV  no BB since pt is bradycardic  with junctional rhythm  Cardio: Dr Benjamin

## 2018-03-04 NOTE — ED PROVIDER NOTE - CARE PLAN
Principal Discharge DX:	Pneumonia  Secondary Diagnosis:	COPD exacerbation  Secondary Diagnosis:	Anemia

## 2018-03-04 NOTE — ED PROVIDER NOTE - PHYSICAL EXAMINATION
Afebrile, hemodynamically stable  NAD, well appearing, no increased WOB  Head NCAT  EOMI grossly  MMM  JVD to angle of jaw  RRR, nml S1/S2, no m/r/g  Diffuse wheezing without focality, good air movement  Abd soft, NT, ND, nml BS, no rebound or guarding  AAO, CN's 3-12 grossly intact  BLACKWELL spontaneously, b/l LE edema, L>R  Skin warm, dry Afebrile, hemodynamically stable  NAD, well appearing, no increased WOB  Head NCAT  EOMI grossly  MMM  JVD to angle of jaw  RRR, nml S1/S2, no m/r/g  Diffuse wheezing without focality, good air movement  Abd soft, NT, ND, nml BS, no rebound or guarding  Rect (Gale PCA as chaperone): Grossly brown, hemoccult negative  AAO, CN's 3-12 grossly intact  BLACKWELL spontaneously, b/l LE edema, L>R  Skin warm, dry

## 2018-03-04 NOTE — H&P ADULT - NSHPPHYSICALEXAM_GEN_ALL_CORE
NAD:  well appearing, no increased WOB  HEENT: NCAT, dry mucosa, PERRLA  Neck: supple neck, JVD  CVS: RRR, no RMG  Lungs: Diffuse bilateral wheezing, no rhonchi, right sided basilar rales   Abd: soft, NT, ND, nml BS, no rebound or guarding  : no suprapubic tenderness   AAO: CN's 3-12 grossly intact  Ext:  b/l LE edema, L>R, no cyanosis  Skin: no rashes, no blisters  Neuro: AAOx3, no focal findings

## 2018-03-04 NOTE — H&P ADULT - HISTORY OF PRESENT ILLNESS
89 y/o F from home with HHA  walks with walker w/ PMHx of CHF unknown EF, asthma, anemia,  hypothyroid, and HTN BIB EMS for SOB,  productive cough and fever.  As per daughter pt started with a mild cough 3 days ago and then last night she had a temp (100.1). Daughter also states that yesterday  her LE started to become edematous. As per EMS pt started wheezing after being moved into the ambulance, however, wheezing improved with Duoneb treatment given by EMS.  Pt denies chest pain, N/V, abdominal pain, no sick contacts or any other complaints.     Code status: Discussed with daughter (Samantha Enriquez) by the phone pt status and current illness. She wants DNI/DNR 89 y/o F from home with HHA  walks with walker w/ PMHx of CHF unknown EF, CKD, asthma, anemia,  hypothyroid, and HTN BIB EMS for SOB,  productive cough and fever.  As per daughter pt started with a mild cough 3 days ago and then last night she had a temp (100.1). Daughter also states that yesterday  her LE started to become edematous. As per EMS pt started wheezing after being moved into the ambulance, however, wheezing improved with Duoneb treatment given by EMS.  Pt denies chest pain, N/V, abdominal pain, no sick contacts or any other complaints.     Code status: Discussed with daughter (Samantha Enriquez) by the phone pt status and current illness. She wants DNI/DNR 89 y/o F from home with HHA  walks with walker w/ PMHx of CHF unknown EF, CKD ( as per daughter one of her kidneys is not very functional), asthma, anemia,  hypothyroid, and HTN BIB EMS for SOB,  productive cough and fever.  As per daughter pt started with a mild cough 3 days ago and then last night she had a temp (100.1). Daughter also states that yesterday  her LE started to become edematous. As per EMS pt started wheezing after being moved into the ambulance, however, wheezing improved with Duoneb treatment given by EMS.  Pt denies chest pain, N/V, abdominal pain, no sick contacts or any other complaints.     Code status: Discussed with daughter (Samantha Enriquez) by the phone pt status and current illness. She wants DNI/DNR 89 y/o F from home with HHA  walks with walker w/ PMHx of CHF unknown EF, CKD ( as per daughter one of her kidneys is not very functional), asthma, anemia,  hypothyroid, and HTN BIB EMS for SOB,  productive cough and fever.  As per daughter pt started with a mild cough 3 days ago and then last night she had a temp (100.1). Daughter also states that yesterday  her LE started to have pedal edema which is far from baseline.. As per EMS pt started wheezing after being moved into the ambulance, however, wheezing improved with Duoneb treatment given by EMS.  Pt denies chest pain, N/V, abdominal pain, no sick contacts or any other complaints.     Code status: Discussed with daughter (Samantha Enriquez) by the phone pt status and current illness. She wants DNI/DNR

## 2018-03-04 NOTE — ED PROVIDER NOTE - OBJECTIVE STATEMENT
89 y/o F pt with PMHx of CHF, thyroid troubles, and HTN BIB EMS for shortness of breath, productive cough, fever x yesterday; and wheezing and BLE swelling x today. AS PER EMS: pt started wheezing after being moved into the ambulance, however, wheezing improved with breathing treatment given by EMS. EMS reports pt was afebrile. PER DAUGHTER AND HHA: Pt was febrile last night (Tmax 100.1) and do not report any treatment prior to arrival. Daughter, HHA and pt deny h/o leg swelling. Pt denies chest pain or any other complaints. CURRENT MEDICATIONS: furosemide 20mg once a day ( no dosage taken today). Alaina gtz as .  89 y/o F pt with PMHx of CHF, hypothyroid, and HTN BIB EMS for shortness of breath, productive cough, fever x yesterday; and wheezing and BLE swelling x today. AS PER EMS: initially well appearing, lungs clear, later pt started wheezing after being moved into the ambulance, however, wheezing improved with Duoneb treatment given by EMS. PER DAUGHTER AND HHA: Pt was febrile last night (Tmax 100.1) and do not report any treatment prior to arrival. Associated b/l symmetric LE swelling. Pt denies chest pain or any other complaints. CURRENT MEDICATIONS: furosemide 20mg once a day (no dosage taken today), albuterol, amlo, statin, Synthroid.

## 2018-03-04 NOTE — ED ADULT NURSE NOTE - ED STAT RN HANDOFF DETAILS
report given to THUAN Michel in holding area in stable condition for continuation of care. pt a&ox3, admitted for PNA and COPDE. 20G LAC. HHA at bedside.

## 2018-03-05 NOTE — CONSULT NOTE ADULT - PROBLEM SELECTOR RECOMMENDATION 4
pt is on 20 Lasix  at home, will start 20 IV  Cardio f/u monitor BMP  f/u urine lytes  Nephro: Dr Moore

## 2018-03-05 NOTE — PROGRESS NOTE ADULT - SUBJECTIVE AND OBJECTIVE BOX
Patient is a 89y old  Female who presents with a chief complaint of     INTERVAL HPI/OVERNIGHT EVENTS: No major overnight events.    MEDICATIONS  (STANDING):  ALBUTerol    0.083% 2.5 milliGRAM(s) Nebulizer every 15 minutes  ALBUTerol/ipratropium for Nebulization 3 milliLiter(s) Nebulizer every 6 hours  aspirin enteric coated 81 milliGRAM(s) Oral daily  atorvastatin 20 milliGRAM(s) Oral at bedtime  cefTRIAXone   IVPB 1 Gram(s) IV Intermittent every 24 hours  folic acid 1 milliGRAM(s) Oral daily  heparin  Injectable 5000 Unit(s) SubCutaneous every 12 hours  insulin glargine Injectable (LANTUS) 10 Unit(s) SubCutaneous every morning  insulin glargine Injectable (LANTUS) 5 Unit(s) SubCutaneous at bedtime  insulin lispro (HumaLOG) corrective regimen sliding scale   SubCutaneous three times a day before meals  iron sucrose IVPB 100 milliGRAM(s) IV Intermittent every 48 hours  levothyroxine 50 MICROGram(s) Oral daily  methylPREDNISolone sodium succinate Injectable 40 milliGRAM(s) IV Push every 12 hours    MEDICATIONS  (PRN):      Allergies    No Known Allergies    Intolerances        REVIEW OF SYSTEMS:  CONSTITUTIONAL: No fever, weight loss, or fatigue  RESPIRATORY: No cough, wheezing, chills or hemoptysis; No shortness of breath  CARDIOVASCULAR: No chest pain, palpitations, dizziness, or leg swelling  GASTROINTESTINAL: No abdominal or epigastric pain. No nausea, vomiting, or hematemesis; No diarrhea or constipation. No melena or hematochezia.  NEUROLOGICAL: No headaches, memory loss, loss of strength, numbness, or tremors  SKIN: No itching, burning, rashes, or lesions     Vital Signs Last 24 Hrs  T(C): 36.5 (05 Mar 2018 11:13), Max: 36.6 (04 Mar 2018 15:43)  T(F): 97.7 (05 Mar 2018 11:13), Max: 97.9 (04 Mar 2018 15:43)  HR: 68 (05 Mar 2018 11:13) (51 - 68)  BP: 129/41 (05 Mar 2018 11:13) (119/31 - 145/42)  BP(mean): --  RR: 18 (05 Mar 2018 11:13) (17 - 18)  SpO2: 98% (05 Mar 2018 11:13) (95% - 98%)    PHYSICAL EXAM:  GENERAL: NAD,  HEAD:  Atraumatic, Normocephalic  EYES: conjunctiva and sclera clear  NECK: Supple, No JVD,   CHEST/LUNG: Mild wheezing b/l on lungs,  Clear to percussion bilaterally; No rales, rhonchi, or rubs  HEART: Regular rate and rhythm; No murmurs, rubs, or gallops  ABDOMEN: Soft, Nontender, Nondistended; Bowel sounds present  NERVOUS SYSTEM:  Alert & Oriented X2,   EXTREMITIES:1 + edema,   2+ Peripheral Pulses, No clubbing, cyanosis,   SKIN; warm dry    LABS:                        7.6    14.9  )-----------( 180      ( 05 Mar 2018 05:22 )             25.0     03-05    142  |  108  |  96<H>  ----------------------------<  140<H>  4.9   |  21<L>  |  3.93<H>    Ca    8.8      05 Mar 2018 05:21  Phos  6.0     03-05  Mg     3.0     03-05    TPro  7.5  /  Alb  2.5<L>  /  TBili  0.5  /  DBili  x   /  AST  148<H>  /  ALT  158<H>  /  AlkPhos  165<H>  03-04        CAPILLARY BLOOD GLUCOSE      POCT Blood Glucose.: 249 mg/dL (05 Mar 2018 11:49)  POCT Blood Glucose.: 325 mg/dL (05 Mar 2018 00:53)  POCT Blood Glucose.: 305 mg/dL (04 Mar 2018 21:50)      RADIOLOGY & ADDITIONAL TESTS:    Imaging Personally Reviewed:  [ ] YES  [ ] NO    Consultant(s) Notes Reviewed:  [ ] YES  [ ] NO

## 2018-03-05 NOTE — CONSULT NOTE ADULT - PROBLEM SELECTOR RECOMMENDATION 9
1- Continue Rocephin.  2- Obtain procalcitonin level.  3- O2 as needed.  4- Pulmonary management.  5- F/u CXR.

## 2018-03-05 NOTE — PROGRESS NOTE ADULT - PROBLEM SELECTOR PLAN 6
BP controlled  Pt on Amlodipine 10 mg at home   restart as needed Pt has hx of anemia  no active bleeding  iron studies  monitor CBC

## 2018-03-05 NOTE — CONSULT NOTE ADULT - PROBLEM SELECTOR RECOMMENDATION 3
Duoneb  IV steroids   Peak flow: 250  Pfts as OP pt is on 20 Lasix  at home, will start 20 IV  Cardio f/u

## 2018-03-05 NOTE — PROGRESS NOTE ADULT - ASSESSMENT
89 y/o F from home with HHA  walks with walker w/ PMHx of CHF unknown EF, CKD ( as per daughter one of her kidneys is not very functional), asthma, anemia,  hypothyroid, and HTN BIB EMS for SOB,  productive cough and fever.  As per daughter pt started with a mild cough 3 days ago and then last night she had a temp (100.1). Daughter also states that yesterday  her LE started to have pedal edema which is far from baseline.. As per EMS pt started wheezing after being moved into the ambulance, however, wheezing improved with Duoneb treatment given by EMS.  Patient was admitted Pneumonia

## 2018-03-05 NOTE — PROGRESS NOTE ADULT - PROBLEM SELECTOR PLAN 5
Pt has hx of anemia  no active bleeding  iron studies  monitor CBC CKD stage 5  BUN/Cr is worsening  Will f/u BMP  Nephro Dr. Moore  monitor BMP  f/u urinelytes  Nephro: Dr Moore

## 2018-03-05 NOTE — PROGRESS NOTE ADULT - PROBLEM SELECTOR PLAN 4
Pt has hx of kidney disease    as per daughter one of her kidneys is not very functional  pt presents with hyperkalemia of 5.5 w/ no peak t waves   Kayexalate x 1 was given   monitor BMP  f/u urinelytes  Nephro: Dr Moore pt with worsening LE pedal edema  and JVD, EKG with Sinus lianet @ 57 with junctional rhythm. pt was offered peacemaker in the past but was declined  due to age on admission  c/w IV lasix  No BB patient is bradycardiac  Cardio: Dr Benjamin

## 2018-03-05 NOTE — CONSULT NOTE ADULT - SUBJECTIVE AND OBJECTIVE BOX
89 y/o F from home with HHA  walks with walker w/ PMHx of CHF unknown EF, CKD ( as per daughter one of her kidneys is not very functional), asthma, anemia,  hypothyroid, and HTN BIB EMS for SOB,  productive cough and fever.  As per daughter pt started with a mild cough 3 days ago and then last night she had a temp (100.1). Daughter also states that yesterday  her LE started to have pedal edema which is far from baseline.. As per EMS pt started wheezing after being moved into the ambulance, however, wheezing improved with Duoneb treatment given by EMS.  Pt denies chest pain, N/V, abdominal pain, no sick contacts or any other complaints.   Patient followed with her PMD IN THE OUTSIDE.  She was diagnosed with pneumonia and was placed on IV antibiotics.    CKD due to hypertension no history of diabetes.  She does not her base line creatinine  She takes Furosemide 20 mg as need , but not every day    A time ankle leg edema    No c/o dysuria, urinates well    No altered MS.    Anemia on admission patient is not on CURTIS - erythropoietin stimulating agents.      PAST MEDICAL & SURGICAL HISTORY:  HTN (hypertension)  History of thyroid disorder      Home Medications Reviewed    Hospital Medications:   MEDICATIONS  (STANDING):  ALBUTerol    0.083% 2.5 milliGRAM(s) Nebulizer every 15 minutes  ALBUTerol/ipratropium for Nebulization 3 milliLiter(s) Nebulizer every 6 hours  aspirin enteric coated 81 milliGRAM(s) Oral daily  atorvastatin 20 milliGRAM(s) Oral at bedtime  azithromycin  IVPB 500 milliGRAM(s) IV Intermittent daily  cefTRIAXone   IVPB 1 Gram(s) IV Intermittent every 24 hours  folic acid 1 milliGRAM(s) Oral daily  heparin  Injectable 5000 Unit(s) SubCutaneous every 12 hours  insulin glargine Injectable (LANTUS) 10 Unit(s) SubCutaneous every morning  insulin lispro (HumaLOG) corrective regimen sliding scale   SubCutaneous three times a day before meals  levothyroxine 50 MICROGram(s) Oral daily  methylPREDNISolone sodium succinate Injectable 40 milliGRAM(s) IV Push every 12 hours    MEDICATIONS  (PRN):      Allergies    No Known Allergies    Intolerances                              7.6    14.9  )-----------( 180      ( 05 Mar 2018 05:22 )             25.0     03-05    142  |  108  |  96<H>  ----------------------------<  140<H>  4.9   |  21<L>  |  3.93<H>    Ca    8.8      05 Mar 2018 05:21  Phos  6.0     03-05  Mg     3.0     03-05    TPro  7.5  /  Alb  2.5<L>  /  TBili  0.5  /  DBili  x   /  AST  148<H>  /  ALT  158<H>  /  AlkPhos  165<H>  03-04    eGFR if Non : 9: Interpretative comment      RADIOLOGY & ADDITIONAL STUDIES:  INTERPRETATION:  CLINICAL INFORMATION: Shortness of breath.    A single portable view of the chest was obtained.     Comparison: None.     The mediastinal cardiac silhouette is unremarkable.    Large peripheral consolidation in the right upper lobe. In the proper   clinical setting this could represent infection. Correlate clinically and   follow to resolution.  Blunting of the right costophrenic angle question atelectasis and/or   small effusion.  Clear left lung.     No acute osseous finding.     IMPRESSION:    As above.          SOCIAL HISTORY: Denies ETOh,Smoking,     FAMILY HISTORY:  No pertinent family history in first degree relatives      REVIEW OF SYSTEMS:  CONSTITUTIONAL: No malaise, No fatigue, No fevers or chills, well developed, no diaphoresis  EYES/ENT: No visual changes;  No vertigo or throat pain   NECK: No pain or stiffness  RESPIRATORY: No cough, wheezing, hemoptysis; No shortness of breath  CARDIOVASCULAR: No chest pain or palpitations. No edema  GASTROINTESTINAL: No abdominal or epigastric pain. No nausea, vomiting, or hematemesis; No diarrhea or constipation. No melena or hematochezia.  GENITOURINARY: No dysuria, frequency, foamy urine, urinary urgency, incontinence or hematuria  NEUROLOGICAL: No numbness or weakness, No tremor  SKIN: No itching, burning, rashes, or lesions   VASCULAR: No claudication  Musculoskeletal: no arthralgia, no myalgia  All other review of systems is negative unless indicated above.    VITALS:  Vital Signs Last 24 Hrs  T(C): 36.5 (05 Mar 2018 11:13), Max: 36.6 (04 Mar 2018 15:43)  T(F): 97.7 (05 Mar 2018 11:13), Max: 97.9 (04 Mar 2018 15:43)  HR: 68 (05 Mar 2018 11:13) (51 - 68)  BP: 129/41 (05 Mar 2018 11:13) (119/31 - 145/42)  BP(mean): --  RR: 18 (05 Mar 2018 11:13) (17 - 18)  SpO2: 98% (05 Mar 2018 11:13) (95% - 98%)    03-05 @ 07:01  -  03-05 @ 11:58  --------------------------------------------------------  IN: 250 mL / OUT: 0 mL / NET: 250 mL          PHYSICAL EXAM:  Constitutional: NAD, alert and awake  HEENT: anicteric sclera, oropharynx clear, MMM  Neck: No JVD  Respiratory: ronchi and wheezing  Cardiovascular: S1, S2, RRR, no pericardial rub, no murmur  Gastrointestinal: BS+, soft, no tenderness, no distension, no bruit  Pelvis: bladder non-distended, no CVA tenderness  Extremities: No cyanosis or clubbing. No peripheral edema

## 2018-03-05 NOTE — CONSULT NOTE ADULT - PROBLEM SELECTOR RECOMMENDATION 2
will start IV abx  f/u RVP   Oxygen supp  R/O atelectasis will start IV abx  f/u RVP   Oxygen supp  R/O atelectasis  follow up CXR

## 2018-03-05 NOTE — CONSULT NOTE ADULT - SUBJECTIVE AND OBJECTIVE BOX
CHIEF COMPLAINT:Patient is a 89y old  Female who presents with a chief complaint of cough,SOB.    HPI:  89 y/o F from home with HHA  walks with walker w/ PMHx of CHF unknown EF, CKD ( as per daughter one of her kidneys is not very functional), asthma, anemia,  hypothyroid, and HTN BIB EMS for SOB,  productive cough and fever.  As per daughter pt started with a mild cough 3 days ago and then last night she had a temp (100.1). Daughter also states that yesterday  her LE started to have pedal edema which is far from baseline.. As per EMS pt started wheezing after being moved into the ambulance, however, wheezing improved with Duoneb treatment given by EMS.  Pt denies chest pain, N/V, abdominal pain, no sick contacts or any other complaints.           PAST MEDICAL & SURGICAL HISTORY:  HTN (hypertension)  History of thyroid disorder  CHF (congestive heart failure)  Asthma  CRI      MEDICATIONS  (STANDING):  ALBUTerol    0.083% 2.5 milliGRAM(s) Nebulizer every 15 minutes  ALBUTerol/ipratropium for Nebulization 3 milliLiter(s) Nebulizer every 6 hours  atorvastatin 20 milliGRAM(s) Oral at bedtime  azithromycin  IVPB 500 milliGRAM(s) IV Intermittent daily  cefTRIAXone   IVPB 1 Gram(s) IV Intermittent every 24 hours  folic acid 1 milliGRAM(s) Oral daily  heparin  Injectable 5000 Unit(s) SubCutaneous every 12 hours  levothyroxine 50 MICROGram(s) Oral daily  methylPREDNISolone sodium succinate Injectable 40 milliGRAM(s) IV Push every 12 hours    MEDICATIONS  (PRN):      FAMILY HISTORY:  No pertinent family history in first degree relatives      SOCIAL HISTORY:    [x ] Non-smoker    [x ] Alcohol-social    Allergies    No Known Allergies    Intolerances    	    REVIEW OF SYSTEMS:  [x ] Unable to obtain    PHYSICAL EXAM:  T(C): 36.4 (03-05-18 @ 06:59), Max: 37.4 (03-04-18 @ 11:31)  HR: 68 (03-05-18 @ 06:59) (51 - 68)  BP: 119/31 (03-05-18 @ 06:59) (104/66 - 145/42)  RR: 18 (03-05-18 @ 06:59) (17 - 18)  SpO2: 95% (03-05-18 @ 06:59) (95% - 100%)  Wt(kg): --  I&O's Summary      Appearance: Normal	  HEENT:   Normal oral mucosa, PERRL, EOMI	  Lymphatic: No lymphadenopathy  Cardiovascular: Normal S1 S2, No JVD, No murmurs, No edema  Respiratory: B/L ronchi  Psychiatry: A & O x 3, Mood & affect appropriate  Gastrointestinal:  Soft, Non-tender, + BS	  Skin: No rashes, No ecchymoses, No cyanosis	  Neurologic: Non-focal  Extremities: Normal range of motion, No clubbing, cyanosis or edema  Vascular: Peripheral pulses palpable 2+ bilaterally    	    ECG:  	afib,IRBB    	  LABS:	 	    CARDIAC MARKERS:  CARDIAC MARKERS ( 04 Mar 2018 19:09 )  0.044 ng/mL / x     / 21 U/L / x     / 1.0 ng/mL  CARDIAC MARKERS ( 04 Mar 2018 12:05 )  0.046 ng/mL / x     / x     / x     / x                                  7.6    14.9  )-----------( 180      ( 05 Mar 2018 05:22 )             25.0     03-05    142  |  108  |  96<H>  ----------------------------<  140<H>  4.9   |  21<L>  |  3.93<H>    Ca    8.8      05 Mar 2018 05:21  Phos  6.0     03-05  Mg     3.0     03-05    TPro  7.5  /  Alb  2.5<L>  /  TBili  0.5  /  DBili  x   /  AST  148<H>  /  ALT  158<H>  /  AlkPhos  165<H>  03-04    proBNP: Serum Pro-Brain Natriuretic Peptide: 8534 pg/mL (03-04 @ 12:05)    Lipid Profile: Cholesterol 67  LDL 27  HDL 23  TG 84    HgA1c: Hemoglobin A1C, Whole Blood: 6.0 % (03-04 @ 21:20)    TSH: Thyroid Stimulating Hormone, Serum: 2.12 uU/mL (03-04 @ 18:20)      EXAM:  US DPLX LWR EXT VEINS LTD LT                            PROCEDURE DATE:  03/04/2018          INTERPRETATION:  CLINICAL INFORMATION: Left Lower extremity swelling.    Ultrasonography of the left lower extremity common femoral, superficial   femoral, popliteal, and deep calf veins was performed using gray scale   and duplex color spectral Doppler imaging.     Comparison: None.    The deep veins of the left lower extremity show normal gray scale   appearance and compressibility.    Duplex Doppler evaluation shows normal waveforms throughout and normal   augmentation.    Mild subcutaneous edema.    IMPRESSION:    No evidence for deep vein thrombosis in the left lower extremity.    EXAM:  XR CHEST PORTABLE IMMED 1V                            PROCEDURE DATE:  03/04/2018          INTERPRETATION:  CLINICAL INFORMATION: Shortness of breath.    A single portable view of the chest was obtained.     Comparison: None.     The mediastinal cardiac silhouette is unremarkable.    Large peripheral consolidation in the right upper lobe. In the proper   clinical setting this could represent infection. Correlate clinically and   follow to resolution.  Blunting of the right costophrenic angle question atelectasis and/or   small effusion.  Clear left lung.     No acute osseous finding.     IMPRESSION:    As above.

## 2018-03-05 NOTE — PROGRESS NOTE ADULT - PROBLEM SELECTOR PLAN 3
pt with worsening LE pedal edema  and JVD, EKG with Sinus lianet @ 57 with junctional rhythm. pt was offered peacemaker in the past but was declined  due to age on admission  BNP 8k  pt Is on 20 Lasix  at home,  will start 20 IV  no BB since pt is bradycardic  with junctional rhythm  Cardio: Dr Benjamin Resolved  S/P joselin

## 2018-03-05 NOTE — CONSULT NOTE ADULT - ASSESSMENT
89 y/o F from home with HHA  walks with walker w/ PMHx of CHF unknown EF, CKD ( as per daughter one of her kidneys is not very functional), asthma, anemia,  hypothyroid, and HTN BIB EMS for SOB,  productive cough and fever.  Patient was admitted for pneumonia and CHF and was started on IV Rocephin.

## 2018-03-05 NOTE — CONSULT NOTE ADULT - SUBJECTIVE AND OBJECTIVE BOX
HPI:  89 y/o F from home with HHA  walks with walker w/ PMHx of CHF unknown EF, CKD ( as per daughter one of her kidneys is not very functional), asthma, anemia,  hypothyroid, and HTN BIB EMS for SOB,  productive cough and fever.  As per daughter pt started with a mild cough 3 days ago and then last night she had a temp (100.1). Daughter also states that yesterday  her LE started to have pedal edema which is far from baseline.. As per EMS pt started wheezing after being moved into the ambulance, however, wheezing improved with Duoneb treatment given by EMS.  Pt denies chest pain, N/V, abdominal pain, no sick contacts or any other complaints.     Code status: Discussed with daughter (Samantha Enriquez) by the phone pt status and current illness. She wants DNI/DNR (04 Mar 2018 15:29)      PAST MEDICAL & SURGICAL HISTORY:  HTN (hypertension)  History of thyroid disorder  CHF (congestive heart failure)  No significant past surgical history      No Known Allergies      ALBUTerol    0.083% 2.5 milliGRAM(s) Nebulizer every 15 minutes  ALBUTerol/ipratropium for Nebulization 3 milliLiter(s) Nebulizer every 6 hours  aspirin enteric coated 81 milliGRAM(s) Oral daily  atorvastatin 20 milliGRAM(s) Oral at bedtime  cefTRIAXone   IVPB 1 Gram(s) IV Intermittent every 24 hours  folic acid 1 milliGRAM(s) Oral daily  heparin  Injectable 5000 Unit(s) SubCutaneous every 12 hours  insulin glargine Injectable (LANTUS) 10 Unit(s) SubCutaneous every morning  insulin glargine Injectable (LANTUS) 5 Unit(s) SubCutaneous at bedtime  insulin lispro (HumaLOG) corrective regimen sliding scale   SubCutaneous three times a day before meals  iron sucrose IVPB 100 milliGRAM(s) IV Intermittent every 48 hours  levothyroxine 50 MICROGram(s) Oral daily  methylPREDNISolone sodium succinate Injectable 40 milliGRAM(s) IV Push every 12 hours      Social Hx:    FAMILY HISTORY:  No pertinent family history in first degree relatives        ROS  [  ] UNABLE TO ELICIT    General:  [  ] None  [ x ] Fever  [  ] Chills  [  ] Malaise    Skin:  [ x ] None [  ] Rash  [  ] Wound  [  ] Ulcer    HEENT:  [ x ] None  [  ] Sore Throat  [  ] Nasal congestion/ runny nose  [  ] Photophobia [  ] Neck pain      Chest:  [  ] None   [ x ] SOB  [ x ] Cough  [  ] None    Cardiovascular:   [ x ] None  [  ] CP  [  ] Palpitation    Gastrointestinal:  [ x ] None  [  ] Abd pain   [  ] Nausea    [  ] Vomiting   [  ] Diarrhea	     Genitourinary:  [ x ] None [  ] Polyuria   [  ] Urgency  [  ] Frequency  [  ] Dysuria    [  ]  Hematuria       Musculoskeletal:  [  ] None [  ] Back Pain	[  ] Body aches  [  ] Joint pain [ x ] legs edema    Neurological: [  ] None [  ]Dizziness  [  ]Visual Disturbance  [  ]Headaches   [ x ] Weakness      PHYSICAL EXAM:    Vital Signs Last 24 Hrs  T(C): 36.4 (05 Mar 2018 15:47), Max: 36.6 (04 Mar 2018 20:43)  T(F): 97.6 (05 Mar 2018 15:47), Max: 97.9 (04 Mar 2018 20:43)  HR: 63 (05 Mar 2018 15:47) (51 - 68)  BP: 135/44 (05 Mar 2018 15:47) (119/31 - 145/42)  BP(mean): --  RR: 18 (05 Mar 2018 15:47) (17 - 18)  SpO2: 100% (05 Mar 2018 15:47) (95% - 100%)    Constitutional:    HEENT: [ x ] Wnl  [  ] Pharyngeal congestion    Neck:  [  ] Supple  [  ]Lymphadenopathy  [  ] No JVD   [  ] JVD  [  ] Masses   [  ] WNL    CHEST/Respiratory:  [  ]Clear to auscultation  [ x ] Rales   [  ] Rhonchi   [ x ] Wheezing     [  ] Chest Tenderness      Cardiovascular:  [ x ] Reg S1 S2   [  ] Irreg S1 S2   [ x ]No Murmur  [  ] +ve Murmurs  [  ]Systolic [  ]Diastolic      Abdomen:  [ x ] Soft  [  ] No tendrerness  [  ] Tenderness  [  ] Organomegaly  [  ] ABD Distention  [  ] Rigidity                       [ x ] No Regidity                       [ x ] No Rebound Tenderness  [  ] No Guarding Rigidity  [  ] Rebound Tenderness[  ] Guarding Rigidity                          [ x ]  +ve Bowel Sounds  [  ] Decreased Bowel Sounds    [  ] Absent Bowel Sounds                            Extremities: [  ] No edema [ x ] Edema  [  ] Clubbing   [  ] Cyanosis                         [ x ] No Tender Calf muscles  [  ] Tender Calf muscles                        [ x ] Palpable peripheral pulses    Neurological: [ x ] Awake  [ x ] Alert  [  ] Oriented  x                             [  ] Confused  [  ] Drowsy  [  ] respond to painful stimuli  [  ] Unresponsive    Skin:  [ x ] Intact [  ] Redness [  ] Thrombophlebitis  [  ] Rashes  [  ] Dry  [  ] Ulcers    Ortho:  [  ] Joint Swelling  [  ] Joint erythema [  ] Joint tenderness                [  ] Increased temp. to touch  [  ] DJD [ x ] WNL      LABS/DIAGNOSTIC TESTS                          7.6    14.9  )-----------( 180      ( 05 Mar 2018 05:22 )             25.0     WBC Count: 14.9 K/uL (03-05 @ 05:22)  WBC Count: 15.7 K/uL (03-04 @ 19:08)  WBC Count: 16.9 K/uL (03-04 @ 12:05)      03-05    142  |  108  |  96<H>  ----------------------------<  140<H>  4.9   |  21<L>  |  3.93<H>    Ca    8.8      05 Mar 2018 05:21  Phos  6.0     03-05  Mg     3.0     03-05    TPro  7.5  /  Alb  2.5<L>  /  TBili  0.5  /  DBili  x   /  AST  148<H>  /  ALT  158<H>  /  AlkPhos  165<H>  03-04          LIVER FUNCTIONS - ( 04 Mar 2018 12:05 )  Alb: 2.5 g/dL / Pro: 7.5 g/dL / ALK PHOS: 165 U/L / ALT: 158 U/L DA / AST: 148 U/L / GGT: x                 LACTATE:      CULTURES:       RADIOLOGY    CXR:    EXAM:  XR CHEST PORTABLE IMMED 1V                            PROCEDURE DATE:  03/04/2018          INTERPRETATION:  CLINICAL INFORMATION: Shortness of breath.    A single portable view of the chest was obtained.     Comparison: None.     The mediastinal cardiac silhouette is unremarkable.    Large peripheral consolidation in the right upper lobe. In the proper   clinical setting this could represent infection. Correlate clinically and   follow to resolution.  Blunting of the right costophrenic angle question atelectasis and/or   small effusion.  Clear left lung.     No acute osseous finding.     IMPRESSION:    As above.          EXAM:  US DPLX LWR EXT VEINS LTD LT                            PROCEDURE DATE:  03/04/2018          INTERPRETATION:  CLINICAL INFORMATION: Left Lower extremity swelling.    Ultrasonography of the left lower extremity common femoral, superficial   femoral, popliteal, and deep calf veins was performed using gray scale   and duplex color spectral Doppler imaging.     Comparison: None.    The deep veins of the left lower extremity show normal gray scale   appearance and compressibility.    Duplex Doppler evaluation shows normal waveforms throughout and normal   augmentation.    Mild subcutaneous edema.    IMPRESSION:    No evidence for deep vein thrombosis in the left lower extremity.

## 2018-03-05 NOTE — CONSULT NOTE ADULT - PROBLEM SELECTOR RECOMMENDATION 2
1- Bronchodilators.  2- O2 as needed.  3- Pulmonary evaluation and management.  4- Steroids as per primary, and pulmonary team.

## 2018-03-05 NOTE — CONSULT NOTE ADULT - ASSESSMENT
89 years old with CKD stage 5  Hyperkalemia on admission resolved.  Increased in BUN creatinine possible due to diuretics.  Elevated BUN ?I BLEED    Anemia low iron saturation.    Suggest:  Diet 2gm sodium and K diet.  Follow stool for occult blood. Start Venofer.  Follow PO4 and PTH plus vitamin D.  Hold diuretics at present.    Renal US.

## 2018-03-05 NOTE — PROGRESS NOTE ADULT - PROBLEM SELECTOR PLAN 1
Patient presented with leukocytosis, fevers, productive cough and consolidation on CXR -- > Pneumonia  RVP non detected  procalcitonin is 1.02  c/w rocephin  F/u BC and legionella antibody Patient presented with leukocytosis, fevers, productive cough and consolidation on CXR -- > Pneumonia  RVP non detected  procalcitonin is 1.02  c/w rocephin  F/u BC and legionella antibody  ID Dr. Chen

## 2018-03-05 NOTE — CONSULT NOTE ADULT - ASSESSMENT
89 y/o F from home with HHA  walks with walker w/ PMHx of CHF unknown EF, CKD ( as per daughter one of her kidneys is not very functional), asthma, anemia,  hypothyroid, and HTN BIB EMS for SOB,  productive cough , fever and afib.  1.Tele monitoring.  2.Abx and steroids.  3.Echocardiogram.  4.Afib-asa.  5.Chjeck anemia profile.  6.Hypothyroidism-synthroid.  7.GI and DVT prophylaxis.

## 2018-03-05 NOTE — CONSULT NOTE ADULT - SUBJECTIVE AND OBJECTIVE BOX
PULMONARY CONSULT NOTE      NATASHA DAI  MRN-[id number]    Patient is a 89y old  Female who presents with a chief complaint of SOB, productive cough and fever. PMHx of CHF unknown EF, CKD (as per daughter one of her kidneys is not very functional), asthma, anemia,  hypothyroid, and HTN.      HISTORY OF PRESENT ILLNESS:    MEDICATIONS  (STANDING):  ALBUTerol    0.083% 2.5 milliGRAM(s) Nebulizer every 15 minutes  ALBUTerol/ipratropium for Nebulization 3 milliLiter(s) Nebulizer every 6 hours  aspirin enteric coated 81 milliGRAM(s) Oral daily  atorvastatin 20 milliGRAM(s) Oral at bedtime  azithromycin  IVPB 500 milliGRAM(s) IV Intermittent daily  cefTRIAXone   IVPB 1 Gram(s) IV Intermittent every 24 hours  folic acid 1 milliGRAM(s) Oral daily  heparin  Injectable 5000 Unit(s) SubCutaneous every 12 hours  insulin glargine Injectable (LANTUS) 10 Unit(s) SubCutaneous every morning  insulin lispro (HumaLOG) corrective regimen sliding scale   SubCutaneous three times a day before meals  levothyroxine 50 MICROGram(s) Oral daily  methylPREDNISolone sodium succinate Injectable 40 milliGRAM(s) IV Push every 12 hours      MEDICATIONS  (PRN):      Allergies    No Known Allergies    Intolerances        PAST MEDICAL & SURGICAL HISTORY:  HTN (hypertension)  History of thyroid disorder  CHF (congestive heart failure)  No significant past surgical history      FAMILY HISTORY:  No pertinent family history in first degree relatives      SOCIAL HISTORY  Smoking History:     REVIEW OF SYSTEMS:    CONSTITUTIONAL:  No fevers, chills, sweats    HEENT:  Eyes:  No diplopia or blurred vision. ENT:  No earache, sore throat or runny nose.    CARDIOVASCULAR:  No pressure, squeezing, tightness, or heaviness about the chest; no palpitations.    RESPIRATORY:  Per HPI    GASTROINTESTINAL:  No abdominal pain, nausea, vomiting or diarrhea.    GENITOURINARY:  No dysuria, frequency or urgency.    NEUROLOGIC:  No paresthesias, fasciculations, seizures or weakness.    PSYCHIATRIC:  No disorder of thought or mood.    Vital Signs Last 24 Hrs  T(C): 36.4 (05 Mar 2018 06:59), Max: 37.4 (04 Mar 2018 11:31)  T(F): 97.6 (05 Mar 2018 06:59), Max: 99.3 (04 Mar 2018 11:31)  HR: 68 (05 Mar 2018 06:59) (51 - 68)  BP: 119/31 (05 Mar 2018 06:59) (104/66 - 145/42)  BP(mean): --  RR: 18 (05 Mar 2018 06:59) (17 - 18)  SpO2: 95% (05 Mar 2018 06:59) (95% - 100%)  I&O's Detail    05 Mar 2018 07:01  -  05 Mar 2018 10:39  --------------------------------------------------------  IN:    Oral Fluid: 250 mL  Total IN: 250 mL    OUT:  Total OUT: 0 mL    Total NET: 250 mL          PHYSICAL EXAMINATION:    GENERAL: The patient is a well-developed, well-nourished _____in no apparent distress.     HEENT: Head is normocephalic and atraumatic. Extraocular muscles are intact. Mucous membranes are moist.     NECK: Supple.     LUNGS: Clear to auscultation without wheezing, rales, or rhonchi. Respirations unlabored    HEART: Regular rate and rhythm without murmur.    ABDOMEN: Soft, nontender, and nondistended.  No hepatosplenomegaly is noted.    EXTREMITIES: Without any cyanosis, clubbing, rash, lesions or edema.    NEUROLOGIC: Grossly intact.      LABS:                        7.6    14.9  )-----------( 180      ( 05 Mar 2018 05:22 )             25.0     03-05    142  |  108  |  96<H>  ----------------------------<  140<H>  4.9   |  21<L>  |  3.93<H>    Ca    8.8      05 Mar 2018 05:21  Phos  6.0     03-05  Mg     3.0     03-05    TPro  7.5  /  Alb  2.5<L>  /  TBili  0.5  /  DBili  x   /  AST  148<H>  /  ALT  158<H>  /  AlkPhos  165<H>  03-04          CARDIAC MARKERS ( 04 Mar 2018 19:09 )  0.044 ng/mL / x     / 21 U/L / x     / 1.0 ng/mL  CARDIAC MARKERS ( 04 Mar 2018 12:05 )  0.046 ng/mL / x     / x     / x     / x            Serum Pro-Brain Natriuretic Peptide: 8534 pg/mL (03-04-18 @ 12:05)    Lactate, Blood: 1.4 mmol/L (03-04-18 @ 12:05)    Procalcitonin, Serum: 1.02 ng/mL (03-04-18 @ 21:31)      MICROBIOLOGY:    RADIOLOGY & ADDITIONAL STUDIES:    CXR:  < from: Xray Chest 1 View-PORTABLE IMMEDIATE (03.04.18 @ 11:52) >  Large peripheral consolidation in the right upper lobe. In the proper   clinical setting this could represent infection. Correlate clinically and   follow to resolution.  Blunting of the right costophrenic angle question atelectasis and/or   small effusion.  Clear left lung.     < end of copied text >    Ct scan chest:    ekg;    echo: PULMONARY CONSULT NOTE      NATASHA DAI  MRN-[id number]    Patient is a 89y old  Female who presents with a chief complaint of SOB, productive cough and fever. PMHx of CHF unknown EF, CKD (as per daughter one of her kidneys is not very functional), asthma, anemia,  hypothyroid, and HTN.      HISTORY OF PRESENT ILLNESS: As above    MEDICATIONS  (STANDING):  ALBUTerol    0.083% 2.5 milliGRAM(s) Nebulizer every 15 minutes  ALBUTerol/ipratropium for Nebulization 3 milliLiter(s) Nebulizer every 6 hours  aspirin enteric coated 81 milliGRAM(s) Oral daily  atorvastatin 20 milliGRAM(s) Oral at bedtime  azithromycin  IVPB 500 milliGRAM(s) IV Intermittent daily  cefTRIAXone   IVPB 1 Gram(s) IV Intermittent every 24 hours  folic acid 1 milliGRAM(s) Oral daily  heparin  Injectable 5000 Unit(s) SubCutaneous every 12 hours  insulin glargine Injectable (LANTUS) 10 Unit(s) SubCutaneous every morning  insulin lispro (HumaLOG) corrective regimen sliding scale   SubCutaneous three times a day before meals  levothyroxine 50 MICROGram(s) Oral daily  methylPREDNISolone sodium succinate Injectable 40 milliGRAM(s) IV Push every 12 hours      MEDICATIONS  (PRN):      Allergies    No Known Allergies    Intolerances        PAST MEDICAL & SURGICAL HISTORY:  HTN (hypertension)  History of thyroid disorder  CHF (congestive heart failure)  No significant past surgical history      FAMILY HISTORY:  No pertinent family history in first degree relatives      SOCIAL HISTORY  Smoking History:     REVIEW OF SYSTEMS:    CONSTITUTIONAL:  No fevers, chills, sweats    HEENT:  Eyes:  No diplopia or blurred vision. ENT:  No earache, sore throat or runny nose.    CARDIOVASCULAR:  No pressure, squeezing, tightness, or heaviness about the chest; no palpitations.    RESPIRATORY:  Per HPI    GASTROINTESTINAL:  No abdominal pain, nausea, vomiting or diarrhea.    GENITOURINARY:  No dysuria, frequency or urgency.    NEUROLOGIC:  No paresthesias, fasciculations, seizures or weakness.    PSYCHIATRIC:  No disorder of thought or mood.    Vital Signs Last 24 Hrs  T(C): 36.4 (05 Mar 2018 06:59), Max: 37.4 (04 Mar 2018 11:31)  T(F): 97.6 (05 Mar 2018 06:59), Max: 99.3 (04 Mar 2018 11:31)  HR: 68 (05 Mar 2018 06:59) (51 - 68)  BP: 119/31 (05 Mar 2018 06:59) (104/66 - 145/42)  BP(mean): --  RR: 18 (05 Mar 2018 06:59) (17 - 18)  SpO2: 95% (05 Mar 2018 06:59) (95% - 100%)  I&O's Detail    05 Mar 2018 07:01  -  05 Mar 2018 10:39  --------------------------------------------------------  IN:    Oral Fluid: 250 mL  Total IN: 250 mL    OUT:  Total OUT: 0 mL    Total NET: 250 mL          PHYSICAL EXAMINATION:    GENERAL: The patient is a well-developed, well-nourished _____in no apparent distress.     HEENT: Head is normocephalic and atraumatic. Extraocular muscles are intact. Mucous membranes are moist.     NECK: Supple.     LUNGS: Bilateral rales    HEART: Regular rate and rhythm without murmur.    ABDOMEN: Soft, nontender, and nondistended.  No hepatosplenomegaly is noted.    EXTREMITIES: Without any cyanosis, clubbing, rash, lesions or edema.    NEUROLOGIC: Grossly intact.      LABS:                        7.6    14.9  )-----------( 180      ( 05 Mar 2018 05:22 )             25.0     03-05    142  |  108  |  96<H>  ----------------------------<  140<H>  4.9   |  21<L>  |  3.93<H>    Ca    8.8      05 Mar 2018 05:21  Phos  6.0     03-05  Mg     3.0     03-05    TPro  7.5  /  Alb  2.5<L>  /  TBili  0.5  /  DBili  x   /  AST  148<H>  /  ALT  158<H>  /  AlkPhos  165<H>  03-04          CARDIAC MARKERS ( 04 Mar 2018 19:09 )  0.044 ng/mL / x     / 21 U/L / x     / 1.0 ng/mL  CARDIAC MARKERS ( 04 Mar 2018 12:05 )  0.046 ng/mL / x     / x     / x     / x            Serum Pro-Brain Natriuretic Peptide: 8534 pg/mL (03-04-18 @ 12:05)    Lactate, Blood: 1.4 mmol/L (03-04-18 @ 12:05)    Procalcitonin, Serum: 1.02 ng/mL (03-04-18 @ 21:31)      MICROBIOLOGY:    RADIOLOGY & ADDITIONAL STUDIES:    CXR:  < from: Xray Chest 1 View-PORTABLE IMMEDIATE (03.04.18 @ 11:52) >  Large peripheral consolidation in the right upper lobe. In the proper   clinical setting this could represent infection. Correlate clinically and   follow to resolution.  Blunting of the right costophrenic angle question atelectasis and/or   small effusion.  Clear left lung.     < end of copied text >    Ct scan chest:    ekg;    echo:

## 2018-03-05 NOTE — CONSULT NOTE ADULT - PROBLEM SELECTOR RECOMMENDATION 9
Oxygen supp  Pfts as OP VTE score= 2 for age and immobilization  heparin SQ Duoneb  IV steroids   Peak flow: 250  Pfts as OP  montelukast 10 mgs po qhs

## 2018-03-05 NOTE — CONSULT NOTE ADULT - PROBLEM SELECTOR RECOMMENDATION 4
1- Monitor Blood pressure closely.  2- Blood pressure control.  3- BP. and cardiac meds and management as per cardiology and primary care team.

## 2018-03-05 NOTE — CHART NOTE - NSCHARTNOTEFT_GEN_A_CORE
Pt's potassium continued to rise on follow up BMP.  No EKG changes.  Pt received cocktail, and responded appropriately.

## 2018-03-05 NOTE — PROGRESS NOTE ADULT - SUBJECTIVE AND OBJECTIVE BOX
89 y/o F from home with HHA  walks with walker w/ PMHx of CHF unknown EF, CKD ( as per daughter one of her kidneys is not very functional), asthma, anemia,  hypothyroid, and HTN BIB EMS for SOB,  productive cough and fever.  As per daughter pt started with a mild cough 3 days ago and then last night she had a temp (100.1). Daughter also states that yesterday  her LE started to have pedal edema which is far from baseline.. As per EMS pt started wheezing after being moved into the ambulance, however, wheezing improved with Duoneb treatment given by EMS.  Pt denies chest pain, N/V, abdominal pain, no sick contacts or any other complaints.     Code status: Discussed with daughter (Samantha Enriquez) by the phone pt status and current illness. She wants DNI/DNR     Review of Systems:  Other Review of Systems: All other review of systems negative, except as noted in HPI	      pt seen in tele [ x ], reg med floor [   ], bed [ x ], chair at bedside [   ], a+o x3 [ x ], lethargic [  ],  nad [ x ]        Allergies    No Known Allergies        Vitals    T(F): 97.6 (03-05-18 @ 06:59), Max: 99.3 (03-04-18 @ 11:31)  HR: 68 (03-05-18 @ 06:59) (51 - 68)  BP: 119/31 (03-05-18 @ 06:59) (104/66 - 145/42)  RR: 18 (03-05-18 @ 06:59) (17 - 18)  SpO2: 95% (03-05-18 @ 06:59) (95% - 100%)  Wt(kg): --  CAPILLARY BLOOD GLUCOSE      POCT Blood Glucose.: 325 mg/dL (05 Mar 2018 00:53)      Labs                          7.6    14.9  )-----------( 180      ( 05 Mar 2018 05:22 )             25.0       03-05    142  |  108  |  96<H>  ----------------------------<  140<H>  4.9   |  21<L>  |  3.93<H>    Ca    8.8      05 Mar 2018 05:21  Phos  6.0     03-05  Mg     3.0     03-05    TPro  7.5  /  Alb  2.5<L>  /  TBili  0.5  /  DBili  x   /  AST  148<H>  /  ALT  158<H>  /  AlkPhos  165<H>  03-04      CARDIAC MARKERS ( 04 Mar 2018 19:09 )  0.044 ng/mL / x     / 21 U/L / x     / 1.0 ng/mL  CARDIAC MARKERS ( 04 Mar 2018 12:05 )  0.046 ng/mL / x     / x     / x     / x                Radiology Results      Meds    MEDICATIONS  (STANDING):  ALBUTerol    0.083% 2.5 milliGRAM(s) Nebulizer every 15 minutes  ALBUTerol/ipratropium for Nebulization 3 milliLiter(s) Nebulizer every 6 hours  aspirin enteric coated 81 milliGRAM(s) Oral daily  atorvastatin 20 milliGRAM(s) Oral at bedtime  azithromycin  IVPB 500 milliGRAM(s) IV Intermittent daily  cefTRIAXone   IVPB 1 Gram(s) IV Intermittent every 24 hours  folic acid 1 milliGRAM(s) Oral daily  heparin  Injectable 5000 Unit(s) SubCutaneous every 12 hours  levothyroxine 50 MICROGram(s) Oral daily  methylPREDNISolone sodium succinate Injectable 40 milliGRAM(s) IV Push every 12 hours      MEDICATIONS  (PRN):      Physical Exam    Neuro :  no focal deficits  Respiratory: CTA B/L  CV: RRR, S1S2, no murmurs,   Abdominal: Soft, NT, ND +BS,  Extremities: No edema, + peripheral pulses    ASSESSMENT    Pneumonia  anemia  acute on ckd  electrolyte imbalance  afib  h/o HTN (hypertension)  asthma  CHF (congestive heart failure)  hypothyroid  No significant past surgical history      PLAN    cont tele  f/u echo  cardio cons noted  cont aspirin  cont rocephin and zithromax  cont atrov and prov nebs,   cont supplemental oxygen,   potassium now wnl  f/u cbc, bmp, mg, phos,   f/u lipids, tsh,   f/u bld cx, ua, ucx,   id cons  pulm cons  renal cons  cont current meds 89 y/o F from home with HHA  walks with walker w/ PMHx of CHF unknown EF, CKD ( as per daughter one of her kidneys is not very functional), asthma, anemia,  hypothyroid, and HTN BIB EMS for SOB,  productive cough and fever.  As per daughter pt started with a mild cough 3 days ago and then last night she had a temp (100.1). Daughter also states that yesterday  her LE started to have pedal edema which is far from baseline.. As per EMS pt started wheezing after being moved into the ambulance, however, wheezing improved with Duoneb treatment given by EMS.  Pt denies chest pain, N/V, abdominal pain, no sick contacts or any other complaints.     Code status: Discussed with daughter (Samantha Enriquez) by the phone pt status and current illness. She wants DNI/DNR     Review of Systems:  Other Review of Systems: All other review of systems negative, except as noted in HPI	      pt seen in tele [ x ], reg med floor [   ], bed [ x ], chair at bedside [   ], a+o x3 [ x ], lethargic [  ],  nad [ x ]        Allergies    No Known Allergies        Vitals    T(F): 97.6 (03-05-18 @ 06:59), Max: 99.3 (03-04-18 @ 11:31)  HR: 68 (03-05-18 @ 06:59) (51 - 68)  BP: 119/31 (03-05-18 @ 06:59) (104/66 - 145/42)  RR: 18 (03-05-18 @ 06:59) (17 - 18)  SpO2: 95% (03-05-18 @ 06:59) (95% - 100%)  Wt(kg): --  CAPILLARY BLOOD GLUCOSE      POCT Blood Glucose.: 325 mg/dL (05 Mar 2018 00:53)      Labs                          7.6    14.9  )-----------( 180      ( 05 Mar 2018 05:22 )             25.0       03-05    142  |  108  |  96<H>  ----------------------------<  140<H>  4.9   |  21<L>  |  3.93<H>    Ca    8.8      05 Mar 2018 05:21  Phos  6.0     03-05  Mg     3.0     03-05    TPro  7.5  /  Alb  2.5<L>  /  TBili  0.5  /  DBili  x   /  AST  148<H>  /  ALT  158<H>  /  AlkPhos  165<H>  03-04      CARDIAC MARKERS ( 04 Mar 2018 19:09 )  0.044 ng/mL / x     / 21 U/L / x     / 1.0 ng/mL  CARDIAC MARKERS ( 04 Mar 2018 12:05 )  0.046 ng/mL / x     / x     / x     / x                Radiology Results      Meds    MEDICATIONS  (STANDING):  ALBUTerol    0.083% 2.5 milliGRAM(s) Nebulizer every 15 minutes  ALBUTerol/ipratropium for Nebulization 3 milliLiter(s) Nebulizer every 6 hours  aspirin enteric coated 81 milliGRAM(s) Oral daily  atorvastatin 20 milliGRAM(s) Oral at bedtime  azithromycin  IVPB 500 milliGRAM(s) IV Intermittent daily  cefTRIAXone   IVPB 1 Gram(s) IV Intermittent every 24 hours  folic acid 1 milliGRAM(s) Oral daily  heparin  Injectable 5000 Unit(s) SubCutaneous every 12 hours  levothyroxine 50 MICROGram(s) Oral daily  methylPREDNISolone sodium succinate Injectable 40 milliGRAM(s) IV Push every 12 hours      MEDICATIONS  (PRN):      Physical Exam    Neuro :  no focal deficits  Respiratory: B/L crackles  CV: RRR, S1S2, no murmurs,   Abdominal: Soft, NT, ND +BS,  Extremities: No edema, + peripheral pulses    ASSESSMENT    Pneumonia  anemia  acute on ckd  electrolyte imbalance  afib  h/o HTN (hypertension)  asthma  CHF (congestive heart failure)  hypothyroid  No significant past surgical history      PLAN    cont tele  f/u echo  cardio cons noted  cont aspirin  cont rocephin and zithromax  cont atrov and prov nebs,   cont supplemental oxygen,   potassium now wnl  f/u cbc, bmp, mg, phos,   f/u lipids, tsh,   f/u bld cx, ua, ucx,   id cons  pulm cons  renal cons  cont current meds

## 2018-03-06 NOTE — PROGRESS NOTE ADULT - PROBLEM SELECTOR PLAN 6
BP controlled  Pt on Amlodipine 10 mg at home   restart as needed BP controlled on lower side  Pt on Amlodipine 10 mg at home   restart as needed

## 2018-03-06 NOTE — PROGRESS NOTE ADULT - SUBJECTIVE AND OBJECTIVE BOX
Patient is a 89y old  Female who presents with a chief complaint of     INTERVAL HPI/OVERNIGHT EVENTS: No major overnight events    MEDICATIONS  (STANDING):  ALBUTerol    0.083% 2.5 milliGRAM(s) Nebulizer every 15 minutes  ALBUTerol/ipratropium for Nebulization 3 milliLiter(s) Nebulizer every 6 hours  aspirin enteric coated 81 milliGRAM(s) Oral daily  atorvastatin 20 milliGRAM(s) Oral at bedtime  cefTRIAXone   IVPB 1 Gram(s) IV Intermittent every 24 hours  epoetin anju Injectable 3000 Unit(s) SubCutaneous <User Schedule>  folic acid 1 milliGRAM(s) Oral daily  heparin  Injectable 5000 Unit(s) SubCutaneous every 12 hours  insulin glargine Injectable (LANTUS) 10 Unit(s) SubCutaneous every morning  insulin glargine Injectable (LANTUS) 5 Unit(s) SubCutaneous at bedtime  insulin lispro (HumaLOG) corrective regimen sliding scale   SubCutaneous three times a day before meals  iron sucrose IVPB 100 milliGRAM(s) IV Intermittent every 48 hours  levothyroxine 50 MICROGram(s) Oral daily  methylPREDNISolone sodium succinate Injectable 40 milliGRAM(s) IV Push every 12 hours  polyethylene glycol 3350 17 Gram(s) Oral once  sevelamer hydrochloride 800 milliGRAM(s) Oral three times a day with meals    MEDICATIONS  (PRN):      Allergies    No Known Allergies    Intolerances        REVIEW OF SYSTEMS:  CONSTITUTIONAL: No fever, weight loss, or fatigue  RESPIRATORY: cough, wheezing, No chills or hemoptysis; No shortness of breath  CARDIOVASCULAR: No chest pain, palpitations, dizziness, or leg swelling  GASTROINTESTINAL: No abdominal or epigastric pain. No nausea, vomiting, or hematemesis; No diarrhea or constipation. No melena or hematochezia.  NEUROLOGICAL: No headaches, memory loss, loss of strength, numbness, or tremors  SKIN: No itching, burning, rashes, or lesions     Vital Signs Last 24 Hrs  T(C): 36.3 (06 Mar 2018 15:18), Max: 36.4 (05 Mar 2018 20:23)  T(F): 97.3 (06 Mar 2018 15:18), Max: 97.5 (05 Mar 2018 20:23)  HR: 71 (06 Mar 2018 15:18) (66 - 72)  BP: 148/44 (06 Mar 2018 15:18) (119/46 - 148/44)  BP(mean): --  RR: 18 (06 Mar 2018 15:18) (18 - 20)  SpO2: 94% (06 Mar 2018 15:18) (94% - 99%)    PHYSICAL EXAM:  GENERAL: NAD,   HEAD:  Atraumatic, Normocephalic  EYES:  Conjunctiva and sclera clear  NECK: Supple, No JVD,   CHEST/LUNG: Clear to auscultation, Clear to percussion bilaterally; No rales, rhonchi, wheezing, or rubs  HEART: Regular rate and rhythm; No murmurs, rubs, or gallops  ABDOMEN: Soft, Nontender, Nondistended; Bowel sounds present  NERVOUS SYSTEM:  Alert & Oriented X2   EXTREMITIES:  2+ Peripheral Pulses, No clubbing, cyanosis, or edema  SKIN; warm, dry    LABS:                        7.1    23.9  )-----------( 190      ( 06 Mar 2018 07:04 )             23.1     03-06    142  |  105  |  105<H>  ----------------------------<  151<H>  3.8   |  20<L>  |  3.57<H>    Ca    8.5      06 Mar 2018 07:04  Phos  6.8     03-06  Mg     3.1     03-06          CAPILLARY BLOOD GLUCOSE      POCT Blood Glucose.: 233 mg/dL (06 Mar 2018 11:31)  POCT Blood Glucose.: 147 mg/dL (06 Mar 2018 07:40)  POCT Blood Glucose.: 164 mg/dL (05 Mar 2018 21:11)  POCT Blood Glucose.: 250 mg/dL (05 Mar 2018 16:26)      RADIOLOGY & ADDITIONAL TESTS:    Imaging Personally Reviewed:  [ ] YES  [ ] NO    Consultant(s) Notes Reviewed:  [ ] YES  [ ] NO

## 2018-03-06 NOTE — PROGRESS NOTE ADULT - SUBJECTIVE AND OBJECTIVE BOX
Patient is a 89y old  Female who presents with a chief complaint of SOB,  productive cough and fever.      pt seen in tele [ x ], reg med floor [   ], bed [ x ], chair at bedside [   ], a+o x3 [ x ], lethargic [  ],  nad [ x ]      Allergies    No Known Allergies        Vitals    T(F): 97.3 (03-06-18 @ 11:06), Max: 97.6 (03-05-18 @ 15:47)  HR: 67 (03-06-18 @ 11:06) (63 - 72)  BP: 119/46 (03-06-18 @ 11:06) (119/46 - 145/51)  RR: 18 (03-06-18 @ 11:06) (18 - 20)  SpO2: 94% (03-06-18 @ 11:06) (94% - 100%)  Wt(kg): --  CAPILLARY BLOOD GLUCOSE      POCT Blood Glucose.: 233 mg/dL (06 Mar 2018 11:31)      Labs                          7.1    23.9  )-----------( 190      ( 06 Mar 2018 07:04 )             23.1       03-06    142  |  105  |  105<H>  ----------------------------<  151<H>  3.8   |  20<L>  |  3.57<H>    Ca    8.5      06 Mar 2018 07:04  Phos  6.8     03-06  Mg     3.1     03-06    TPro  7.5  /  Alb  2.5<L>  /  TBili  0.5  /  DBili  x   /  AST  148<H>  /  ALT  158<H>  /  AlkPhos  165<H>  03-04      CARDIAC MARKERS ( 04 Mar 2018 19:09 )  0.044 ng/mL / x     / 21 U/L / x     / 1.0 ng/mL  CARDIAC MARKERS ( 04 Mar 2018 12:05 )  0.046 ng/mL / x     / x     / x     / x            .Blood Blood  03-04 @ 21:44   No growth to date.  --  --          Radiology Results      Meds    MEDICATIONS  (STANDING):  ALBUTerol    0.083% 2.5 milliGRAM(s) Nebulizer every 15 minutes  ALBUTerol/ipratropium for Nebulization 3 milliLiter(s) Nebulizer every 6 hours  aspirin enteric coated 81 milliGRAM(s) Oral daily  atorvastatin 20 milliGRAM(s) Oral at bedtime  cefTRIAXone   IVPB 1 Gram(s) IV Intermittent every 24 hours  folic acid 1 milliGRAM(s) Oral daily  heparin  Injectable 5000 Unit(s) SubCutaneous every 12 hours  insulin glargine Injectable (LANTUS) 10 Unit(s) SubCutaneous every morning  insulin glargine Injectable (LANTUS) 5 Unit(s) SubCutaneous at bedtime  insulin lispro (HumaLOG) corrective regimen sliding scale   SubCutaneous three times a day before meals  iron sucrose IVPB 100 milliGRAM(s) IV Intermittent every 48 hours  levothyroxine 50 MICROGram(s) Oral daily  methylPREDNISolone sodium succinate Injectable 40 milliGRAM(s) IV Push every 12 hours      MEDICATIONS  (PRN):      Physical Exam      Neuro :  no focal deficits  Respiratory: B/L crackles  CV: RRR, S1S2, no murmurs,   Abdominal: Soft, NT, ND +BS,  Extremities: No edema, + peripheral pulses    ASSESSMENT    Pneumonia  anemia  acute on ckd  electrolyte imbalance  afib  h/o HTN (hypertension)  asthma  CHF (congestive heart failure)  hypothyroid  No significant past surgical history      PLAN    cont tele  f/u echo  cardio f/u noted  cont aspirin  cont rocephin and zithromax  cont atrov and prov nebs,   cont supplemental oxygen,   taper steroids  potassium now wnl  bld cx neg noted above  f/u ucx,   id f/u noted  pulm f/u  renal f/u  f/u renal sono  cont venofer  transfuse 1unit prbc  check stool occult blood  cont current meds

## 2018-03-06 NOTE — PROGRESS NOTE ADULT - SUBJECTIVE AND OBJECTIVE BOX
Patient is a 89y old  Female who presents with a chief complaint of sob and cough. Awake, alert, comfortable in bed in NAD.    INTERVAL HPI/OVERNIGHT EVENTS:      VITAL SIGNS:  T(F): 97.3 (03-06-18 @ 11:06)  HR: 67 (03-06-18 @ 11:06)  BP: 119/46 (03-06-18 @ 11:06)  RR: 18 (03-06-18 @ 11:06)  SpO2: 94% (03-06-18 @ 11:06)  Wt(kg): --  I&O's Detail    05 Mar 2018 07:01  -  06 Mar 2018 07:00  --------------------------------------------------------  IN:    Oral Fluid: 400 mL  Total IN: 400 mL    OUT:  Total OUT: 0 mL    Total NET: 400 mL      06 Mar 2018 07:01  -  06 Mar 2018 15:07  --------------------------------------------------------  IN:    Oral Fluid: 150 mL  Total IN: 150 mL    OUT:  Total OUT: 0 mL    Total NET: 150 mL              REVIEW OF SYSTEMS:    CONSTITUTIONAL:  No fevers, chills, sweats    HEENT:  Eyes:  No diplopia or blurred vision. ENT:  No earache, sore throat or runny nose.    CARDIOVASCULAR:  No pressure, squeezing, tightness, or heaviness about the chest; no palpitations.    RESPIRATORY:  Per HPI    GASTROINTESTINAL:  No abdominal pain, nausea, vomiting or diarrhea.    GENITOURINARY:  No dysuria, frequency or urgency.    NEUROLOGIC:  No paresthesias, fasciculations, seizures or weakness.    PSYCHIATRIC:  No disorder of thought or mood.      PHYSICAL EXAM:    Constitutional: Well developed and nourished  Eyes:Perrla  ENMT: normal  Neck:supple  Respiratory: Wheezes anteriorly  Cardiovascular: S1 S2 regular  Gastrointestinal: Soft, Non tender  Extremities: + edema  Vascular:normal  Neurological:Awake, alert,Ox3  Musculoskeletal:Normal      MEDICATIONS  (STANDING):  ALBUTerol    0.083% 2.5 milliGRAM(s) Nebulizer every 15 minutes  ALBUTerol/ipratropium for Nebulization 3 milliLiter(s) Nebulizer every 6 hours  aspirin enteric coated 81 milliGRAM(s) Oral daily  atorvastatin 20 milliGRAM(s) Oral at bedtime  cefTRIAXone   IVPB 1 Gram(s) IV Intermittent every 24 hours  epoetin anju Injectable 3000 Unit(s) SubCutaneous <User Schedule>  folic acid 1 milliGRAM(s) Oral daily  heparin  Injectable 5000 Unit(s) SubCutaneous every 12 hours  insulin glargine Injectable (LANTUS) 10 Unit(s) SubCutaneous every morning  insulin glargine Injectable (LANTUS) 5 Unit(s) SubCutaneous at bedtime  insulin lispro (HumaLOG) corrective regimen sliding scale   SubCutaneous three times a day before meals  iron sucrose IVPB 100 milliGRAM(s) IV Intermittent every 48 hours  levothyroxine 50 MICROGram(s) Oral daily  methylPREDNISolone sodium succinate Injectable 40 milliGRAM(s) IV Push every 12 hours  sevelamer hydrochloride 800 milliGRAM(s) Oral three times a day with meals    MEDICATIONS  (PRN):      Allergies    No Known Allergies    Intolerances        LABS:                        7.1    23.9  )-----------( 190      ( 06 Mar 2018 07:04 )             23.1     03-06    142  |  105  |  105<H>  ----------------------------<  151<H>  3.8   |  20<L>  |  3.57<H>    Ca    8.5      06 Mar 2018 07:04  Phos  6.8     03-06  Mg     3.1     03-06            CARDIAC MARKERS ( 04 Mar 2018 19:09 )  0.044 ng/mL / x     / 21 U/L / x     / 1.0 ng/mL      CAPILLARY BLOOD GLUCOSE      POCT Blood Glucose.: 233 mg/dL (06 Mar 2018 11:31)  POCT Blood Glucose.: 147 mg/dL (06 Mar 2018 07:40)  POCT Blood Glucose.: 164 mg/dL (05 Mar 2018 21:11)  POCT Blood Glucose.: 250 mg/dL (05 Mar 2018 16:26)    pro-bnp [id number] 03-04 @ 12:05     d-dimer --  03-04 @ 12:05      RADIOLOGY & ADDITIONAL TESTS:    CXR:  < from: Xray Chest 1 View-PORTABLE IMMEDIATE (03.04.18 @ 11:52) >  NTERPRETATION:  CLINICAL INFORMATION: Shortness of breath.    A single portable view of the chest was obtained.     Comparison: None.     The mediastinal cardiac silhouette is unremarkable.    Large peripheral consolidation in the right upper lobe. In the proper   clinical setting this could represent infection. Correlate clinically and   follow to resolution.  Blunting of the right costophrenic angle question atelectasis and/or   small effusion.  Clear left lung.     No acute osseous finding.       < end of copied text >    Ct scan chest:    ekg;    echo:

## 2018-03-06 NOTE — PROGRESS NOTE ADULT - PROBLEM SELECTOR PLAN 2
Patient presented with leukocytosis, fevers, productive cough and consolidation on CXR suggestive of Pneumonia  RVP non detected  procalcitonin is 1.02  c/w rocephin  -f/u urine legionella antibody  -Blood culture normal  ID Dr. Chen

## 2018-03-06 NOTE — PROGRESS NOTE ADULT - SUBJECTIVE AND OBJECTIVE BOX
CHIEF COMPLAINT:Patient is a 89y old  Female who presents with a chief complaint of sob. Pt appears comfortable.  	  REVIEW OF SYSTEMS:    [x ] Unable to obtain    PHYSICAL EXAM:  T(C): 36.4 (03-06-18 @ 07:33), Max: 36.5 (03-05-18 @ 11:13)  HR: 67 (03-06-18 @ 07:33) (63 - 72)  BP: 129/55 (03-06-18 @ 07:33) (129/41 - 145/51)  RR: 20 (03-06-18 @ 07:33) (18 - 20)  SpO2: 95% (03-06-18 @ 07:33) (95% - 100%)  Wt(kg): --  I&O's Summary    05 Mar 2018 07:01  -  06 Mar 2018 07:00  --------------------------------------------------------  IN: 400 mL / OUT: 0 mL / NET: 400 mL        Appearance: Normal	  HEENT:   Normal oral mucosa, PERRL, EOMI	  Lymphatic: No lymphadenopathy  Cardiovascular: Normal S1 S2, No JVD, No murmurs, No edema  Respiratory:B/L ronchi	  Gastrointestinal:  Soft, Non-tender, + BS	  Skin: No rashes, No ecchymoses, No cyanosis	  Extremities: Normal range of motion, No clubbing, cyanosis or edema  Vascular: Peripheral pulses palpable 2+ bilaterally    MEDICATIONS  (STANDING):  ALBUTerol    0.083% 2.5 milliGRAM(s) Nebulizer every 15 minutes  ALBUTerol/ipratropium for Nebulization 3 milliLiter(s) Nebulizer every 6 hours  aspirin enteric coated 81 milliGRAM(s) Oral daily  atorvastatin 20 milliGRAM(s) Oral at bedtime  cefTRIAXone   IVPB 1 Gram(s) IV Intermittent every 24 hours  folic acid 1 milliGRAM(s) Oral daily  heparin  Injectable 5000 Unit(s) SubCutaneous every 12 hours  insulin glargine Injectable (LANTUS) 10 Unit(s) SubCutaneous every morning  insulin glargine Injectable (LANTUS) 5 Unit(s) SubCutaneous at bedtime  insulin lispro (HumaLOG) corrective regimen sliding scale   SubCutaneous three times a day before meals  iron sucrose IVPB 100 milliGRAM(s) IV Intermittent every 48 hours  levothyroxine 50 MICROGram(s) Oral daily  methylPREDNISolone sodium succinate Injectable 40 milliGRAM(s) IV Push every 12 hours        	  LABS:	 	    CARDIAC MARKERS:  CARDIAC MARKERS ( 04 Mar 2018 19:09 )  0.044 ng/mL / x     / 21 U/L / x     / 1.0 ng/mL  CARDIAC MARKERS ( 04 Mar 2018 12:05 )  0.046 ng/mL / x     / x     / x     / x                           7.6    14.9  )-----------( 180      ( 05 Mar 2018 05:22 )             25.0     03-05    142  |  108  |  96<H>  ----------------------------<  140<H>  4.9   |  21<L>  |  3.93<H>    Ca    8.8      05 Mar 2018 05:21  Phos  6.0     03-05  Mg     3.0     03-05    TPro  7.5  /  Alb  2.5<L>  /  TBili  0.5  /  DBili  x   /  AST  148<H>  /  ALT  158<H>  /  AlkPhos  165<H>  03-04    proBNP: Serum Pro-Brain Natriuretic Peptide: 8534 pg/mL (03-04 @ 12:05)    Lipid Profile: Cholesterol 67  LDL 27  HDL 23  TG 84    HgA1c: Hemoglobin A1C, Whole Blood: 6.0 % (03-04 @ 21:20)    TSH: Thyroid Stimulating Hormone, Serum: 2.12 uU/mL (03-04 @ 18:20)

## 2018-03-06 NOTE — PROGRESS NOTE ADULT - SUBJECTIVE AND OBJECTIVE BOX
Problem List:  CKD stage 5  Pneumonia right upper lobe.  BC no growth    PAST MEDICAL & SURGICAL HISTORY:  HTN (hypertension)  History of thyroid disorder  CHF (congestive heart failure)        No Known Allergies      MEDICATIONS  (STANDING):  ALBUTerol    0.083% 2.5 milliGRAM(s) Nebulizer every 15 minutes  ALBUTerol/ipratropium for Nebulization 3 milliLiter(s) Nebulizer every 6 hours  aspirin enteric coated 81 milliGRAM(s) Oral daily  atorvastatin 20 milliGRAM(s) Oral at bedtime  cefTRIAXone   IVPB 1 Gram(s) IV Intermittent every 24 hours  folic acid 1 milliGRAM(s) Oral daily  heparin  Injectable 5000 Unit(s) SubCutaneous every 12 hours  insulin glargine Injectable (LANTUS) 10 Unit(s) SubCutaneous every morning  insulin glargine Injectable (LANTUS) 5 Unit(s) SubCutaneous at bedtime  insulin lispro (HumaLOG) corrective regimen sliding scale   SubCutaneous three times a day before meals  iron sucrose IVPB 100 milliGRAM(s) IV Intermittent every 48 hours  levothyroxine 50 MICROGram(s) Oral daily  methylPREDNISolone sodium succinate Injectable 40 milliGRAM(s) IV Push every 12 hours    MEDICATIONS  (PRN):                            7.1    23.9  )-----------( 190      ( 06 Mar 2018 07:04 )             23.1     03-06    142  |  105  |  105<H>  ----------------------------<  151<H>  3.8   |  20<L>  |  3.57<H>    Ca    8.5      06 Mar 2018 07:04  Phos  6.8     03-06  Mg     3.1     03-06          Osmolality, Random Urine: 374 mos/kg (03-05 @ 21:44)  Sodium, Random Urine: 22 mmol/L (03-05 @ 21:44)  Potassium, Random Urine: 39 mmol/L (03-05 @ 21:44)  Creatinine, Random Urine: 99 mg/dL (03-05 @ 21:44)      REVIEW OF SYSTEMS:  RESPIRATORY:  cough and sternal pain and SOB.  CARDIOVASCULAR: No chest pain or palpitations. No Edema  GASTROINTESTINAL: No abdominal or epigastric pain. No nausea, vomiting. No diarrhea or constipation. No melena.  GENITOURINARY: No dysuria.        VITALS:  T(F): 97.3 (03-06-18 @ 11:06), Max: 97.6 (03-05-18 @ 15:47)  HR: 67 (03-06-18 @ 11:06)  BP: 119/46 (03-06-18 @ 11:06)  RR: 18 (03-06-18 @ 11:06)  SpO2: 94% (03-06-18 @ 11:06)  Wt(kg): --    03-05 @ 07:01  -  03-06 @ 07:00  --------------------------------------------------------  IN: 400 mL / OUT: 0 mL / NET: 400 mL    03-06 @ 07:01  -  03-06 @ 12:44  --------------------------------------------------------  IN: 150 mL / OUT: 0 mL / NET: 150 mL        PHYSICAL EXAM:  Constitutional: well developed, no diaphoresis, no distress.  Neck: No JVD, no carotid bruit, supple, no adenopathy  Respiratory: reduced air entrance bilateral and wheezing  Cardiovascular: S1, S2, RRR, no pericardial rub, no murmur  Abdomen: BS+, soft, no tenderness, no bruit  Pelvis: bladder nondistended  Extremities: No edema   Neurological: A/O x 3, no focal deficits  Psychiatric: Normal mood, normal affect

## 2018-03-06 NOTE — PROGRESS NOTE ADULT - PROBLEM SELECTOR PLAN 1
Pt has hx of anemia  no active bleeding  iron studies  monitor CBC Patient's Hb decreases to7.1  will give 1PRBC today, Ordered FOBT  F/u CBC  No signs of active bleeding  will give one IV lasix post transfusion

## 2018-03-06 NOTE — PROGRESS NOTE ADULT - PROBLEM SELECTOR PLAN 5
CKD stage 5  BUN/Cr is worsening  Will f/u BMP  Nephro Dr. Moore  monitor BMP  f/u urinelytes  Nephro: Dr Moore

## 2018-03-06 NOTE — PROGRESS NOTE ADULT - ASSESSMENT
89 y/o F from home with HHA  walks with walker w/ PMHx of CHF unknown EF, CKD ( as per daughter one of her kidneys is not very functional), asthma, anemia,  hypothyroid, and HTN BIB EMS for SOB,  productive cough , fever and afib.  1.Tele monitoring.  2.Abx and steroids.  3.Echocardiogram.  4.Afib-asa.  5.Check anemia profile.  6.Hypothyroidism-synthroid.  7.GI and DVT prophylaxis.

## 2018-03-06 NOTE — PROGRESS NOTE ADULT - PROBLEM SELECTOR PLAN 4
Pt with worsening LE pedal edema  and JVD, EKG with Sinus lianet @ 57 with junctional rhythm. pt was offered peacemaker in the past but was declined due to age on admission  c/w IV lasix  No BB patient is bradycardiac  Cardio: Dr Benjamin Pt with worsening LE pedal edema  and JVD, EKG with Sinus lianet @ 57 with junctional rhythm. pt was offered peacemaker in the past but was declined due to age on admission  will start PO lasix  No BB patient is bradycardiac  Cardio: Dr Benjamin

## 2018-03-06 NOTE — PROGRESS NOTE ADULT - ASSESSMENT
89 years old with CKD stage 5  Hyperkalemia on admission resolved to follow 2 gm K diet and see k in am.   Today K improved.  Increased in BUN creatinine possible due to diuretics improving.    Anemia low iron saturation.  Start Venofer    Suggest:  Diet 2gm sodium and K diet.  Follow stool for occult blood.   Follow PO4 and PTH plus vitamin D.  Hold diuretics at present.    Renal US.

## 2018-03-06 NOTE — PROGRESS NOTE ADULT - SUBJECTIVE AND OBJECTIVE BOX
Meds:  cefTRIAXone   IVPB 1 Gram(s) IV Intermittent every 24 hours    Allergies:  Allergies    No Known Allergies    Intolerances      ROS  [  ] UNABLE TO ELICIT    General:  [  ] None  [ x ] Fever  [  ] Chills  [  ] Malaise    Skin:  [ x ] None [  ] Rash  [  ] Wound  [  ] Ulcer    HEENT:  [ x ] None  [  ] Sore Throat  [  ] Nasal congestion/ runny nose  [  ] Photophobia [  ] Neck pain      Chest:  [  ] None   [ x ] SOB  [ x ] Cough  [  ] None    Cardiovascular:   [ x ] None  [  ] CP  [  ] Palpitation    Gastrointestinal:  [ x ] None  [  ] Abd pain   [  ] Nausea    [  ] Vomiting   [  ] Diarrhea	     Genitourinary:  [ x ] None [  ] Polyuria   [  ] Urgency  [  ] Frequency  [  ] Dysuria    [  ]  Hematuria       Musculoskeletal:  [  ] None [  ] Back Pain	[  ] Body aches  [  ] Joint pain [ x ] legs edema    Neurological: [  ] None [  ]Dizziness  [  ]Visual Disturbance  [  ]Headaches   [ x ] Weakness          PHYSICAL EXAM:    Vital Signs Last 24 Hrs  T(C): 36.4 (06 Mar 2018 05:20), Max: 36.5 (05 Mar 2018 11:13)  T(F): 97.5 (06 Mar 2018 05:20), Max: 97.7 (05 Mar 2018 11:13)  HR: 66 (06 Mar 2018 05:20) (63 - 72)  BP: 135/60 (06 Mar 2018 05:20) (119/31 - 145/51)  BP(mean): --  RR: 18 (06 Mar 2018 05:20) (18 - 18)  SpO2: 99% (06 Mar 2018 05:20) (95% - 100%)    Constitutional:    HEENT: [ x ] Wnl  [  ] Pharyngeal congestion    Neck:  [  ] Supple  [  ]Lymphadenopathy  [  ] No JVD   [  ] JVD  [  ] Masses   [  ] WNL    CHEST/Respiratory:  [  ]Clear to auscultation  [ x ] Rales   [  ] Rhonchi   [ x ] Wheezing     [  ] Chest Tenderness      Cardiovascular:  [ x ] Reg S1 S2   [  ] Irreg S1 S2   [ x ]No Murmur  [  ] +ve Murmurs  [  ]Systolic [  ]Diastolic      Abdomen:  [ x ] Soft  [  ] No tendrerness  [  ] Tenderness  [  ] Organomegaly  [  ] ABD Distention  [  ] Rigidity                       [ x ] No Regidity                       [ x ] No Rebound Tenderness  [  ] No Guarding Rigidity  [  ] Rebound Tenderness[  ] Guarding Rigidity                          [ x ]  +ve Bowel Sounds  [  ] Decreased Bowel Sounds    [  ] Absent Bowel Sounds                            Extremities: [  ] No edema [ x ] Edema  [  ] Clubbing   [  ] Cyanosis                         [ x ] No Tender Calf muscles  [  ] Tender Calf muscles                        [ x ] Palpable peripheral pulses    Neurological: [ x ] Awake  [ x ] Alert  [  ] Oriented  x                             [  ] Confused  [  ] Drowsy  [  ] respond to painful stimuli  [  ] Unresponsive    Skin:  [ x ] Intact [  ] Redness [  ] Thrombophlebitis  [  ] Rashes  [  ] Dry  [  ] Ulcers    Ortho:  [  ] Joint Swelling  [  ] Joint erythema [  ] Joint tenderness                [  ] Increased temp. to touch  [  ] DJD [ x ] WNL          LABS/DIAGNOSTIC TESTS                          7.6    14.9  )-----------( 180      ( 05 Mar 2018 05:22 )             25.0         03-05    142  |  108  |  96<H>  ----------------------------<  140<H>  4.9   |  21<L>  |  3.93<H>    Ca    8.8      05 Mar 2018 05:21  Phos  6.0     03-05  Mg     3.0     03-05    TPro  7.5  /  Alb  2.5<L>  /  TBili  0.5  /  DBili  x   /  AST  148<H>  /  ALT  158<H>  /  AlkPhos  165<H>  03-04      LIVER FUNCTIONS - ( 04 Mar 2018 12:05 )  Alb: 2.5 g/dL / Pro: 7.5 g/dL / ALK PHOS: 165 U/L / ALT: 158 U/L DA / AST: 148 U/L / GGT: x               CULTURES:   Culture - Blood (03.04.18 @ 21:44)    Specimen Source: .Blood Blood    Culture Results:   No growth to date.    Culture - Blood (03.04.18 @ 21:44)    Specimen Source: .Blood Blood    Culture Results:   No growth to date.        RADIOLOGY    CXR:    EXAM:  XR CHEST PORTABLE IMMED 1V                            PROCEDURE DATE:  03/04/2018          INTERPRETATION:  CLINICAL INFORMATION: Shortness of breath.    A single portable view of the chest was obtained.     Comparison: None.     The mediastinal cardiac silhouette is unremarkable.    Large peripheral consolidation in the right upper lobe. In the proper   clinical setting this could represent infection. Correlate clinically and   follow to resolution.  Blunting of the right costophrenic angle question atelectasis and/or   small effusion.  Clear left lung.     No acute osseous finding.     IMPRESSION:    As above.          EXAM:  US DPLX LWR EXT VEINS LTD LT                            PROCEDURE DATE:  03/04/2018          INTERPRETATION:  CLINICAL INFORMATION: Left Lower extremity swelling.    Ultrasonography of the left lower extremity common femoral, superficial   femoral, popliteal, and deep calf veins was performed using gray scale   and duplex color spectral Doppler imaging.     Comparison: None.    The deep veins of the left lower extremity show normal gray scale   appearance and compressibility.    Duplex Doppler evaluation shows normal waveforms throughout and normal   augmentation.    Mild subcutaneous edema.    IMPRESSION:    No evidence for deep vein thrombosis in the left lower extremity.        Assessment and Recommendation:   89 y/o F from home with HHA  walks with walker w/ PMHx of CHF unknown EF, CKD ( as per daughter one of her kidneys is not very functional), asthma, anemia,  hypothyroid, and HTN BIB EMS for SOB,  productive cough and fever.  Patient was admitted for pneumonia and CHF and was started on IV Rocephin.    Problem/Recommendation - 1:  Problem: Pneumonia.   Recommendation:   1- Continue Rocephin.  2- Obtain procalcitonin level.  3- O2 as needed.  4- Pulmonary management.  5- F/u CXR.  6- Follow blood cultures till final reports.    Problem/Recommendation - 2:  ·  Problem: COPD exacerbation.    Recommendation:   1- Bronchodilators.  2- O2 as needed.  3- Pulmonary evaluation and management.  4- Steroids as per primary, and pulmonary team.     Problem/Recommendation - 3:  ·  Problem: Anemia.    Recommendation:   1- Anemia profile.  2- Iron supplements.  3- Closely monitor H & H.  4- Observe for bleeding.     Problem/Recommendation - 4:  ·  Problem: CHF (congestive heart failure).    Recommendation:   1- Monitor Blood pressure closely.  2- Blood pressure control.  3- BP. and cardiac meds and management as per cardiology and primary care team.     Discussed with medical resident.

## 2018-03-06 NOTE — PROGRESS NOTE ADULT - PROBLEM SELECTOR PLAN 8
RISK                                                          Points  [  ] Previous VTE                                                3  [  ] Thrombophilia                                             2  [  ] Lower limb paralysis                                   2        (unable to hold up >15 seconds)    [  ] Current Cancer                                             2         (within 6 months)  [ x ] Immobilization > 24 hrs                              1  [  ] ICU/CCU stay > 24 hours                             1  [ x ] Age > 60                                                         1    IMPROVE VTE Score: 2  heaprin for VTE prophylaxis.  VTE score= 2 for age and immobilization     heparin SQ

## 2018-03-07 NOTE — PROGRESS NOTE ADULT - SUBJECTIVE AND OBJECTIVE BOX
Patient is a 89y old  Female who presents with a chief complaint of SOB,  productive cough and fever.      pt seen in tele [ x ], reg med floor [   ], bed [ x ], chair at bedside [   ], a+o x3 [ x ], lethargic [  ],  nad [ x ]        Allergies    No Known Allergies        Vitals    T(F): 97.2 (03-07-18 @ 04:55), Max: 97.5 (03-06-18 @ 07:33)  HR: 73 (03-07-18 @ 04:55) (67 - 73)  BP: 146/76 (03-07-18 @ 04:55) (119/46 - 148/44)  RR: 18 (03-07-18 @ 04:55) (18 - 20)  SpO2: 96% (03-07-18 @ 04:55) (93% - 96%)  Wt(kg): --  CAPILLARY BLOOD GLUCOSE      POCT Blood Glucose.: 234 mg/dL (06 Mar 2018 21:21)      Labs                          7.1    23.9  )-----------( 190      ( 06 Mar 2018 07:04 )             23.1       03-06    142  |  105  |  105<H>  ----------------------------<  151<H>  3.8   |  20<L>  |  3.57<H>    Ca    8.5      06 Mar 2018 07:04  Phos  6.8     03-06  Mg     3.1     03-06        Legionella Antibody: Positive: Test Performed by:  [facility]  [address] [place] [address] (03.05.18 @ 11:40)          .Blood Blood  03-04 @ 21:44   No growth to date.  --  --          Radiology Results      Meds    MEDICATIONS  (STANDING):  ALBUTerol    0.083% 2.5 milliGRAM(s) Nebulizer every 15 minutes  ALBUTerol/ipratropium for Nebulization 3 milliLiter(s) Nebulizer every 6 hours  aspirin enteric coated 81 milliGRAM(s) Oral daily  atorvastatin 20 milliGRAM(s) Oral at bedtime  cefTRIAXone   IVPB 1 Gram(s) IV Intermittent every 24 hours  epoetin anju Injectable 3000 Unit(s) SubCutaneous <User Schedule>  folic acid 1 milliGRAM(s) Oral daily  heparin  Injectable 5000 Unit(s) SubCutaneous every 12 hours  insulin glargine Injectable (LANTUS) 5 Unit(s) SubCutaneous at bedtime  insulin glargine Injectable (LANTUS) 10 Unit(s) SubCutaneous every morning  insulin lispro (HumaLOG) corrective regimen sliding scale   SubCutaneous three times a day before meals  iron sucrose IVPB 100 milliGRAM(s) IV Intermittent every 48 hours  levothyroxine 50 MICROGram(s) Oral daily  methylPREDNISolone sodium succinate Injectable 40 milliGRAM(s) IV Push every 12 hours  sevelamer hydrochloride 800 milliGRAM(s) Oral three times a day with meals      MEDICATIONS  (PRN):  guaiFENesin    Syrup 100 milliGRAM(s) Oral every 6 hours PRN Cough      Physical Exam      Neuro :  no focal deficits  Respiratory: B/L crackles  CV: RRR, S1S2, no murmurs,   Abdominal: Soft, NT, ND +BS,  Extremities: No edema, + peripheral pulses    ASSESSMENT    Pneumonia  anemia  acute on ckd  electrolyte imbalance  afib  h/o HTN (hypertension)  asthma  CHF (congestive heart failure)  hypothyroid  No significant past surgical history      PLAN    cont tele  f/u echo  cardio f/u noted  cont aspirin  cont rocephin and zithromax  cont atrov and prov nebs,   cont supplemental oxygen,   taper steroids  urine legionella positive  bld cx neg noted above  f/u ucx,   id f/u noted  pulm f/u  renal f/u  f/u renal sono  cont venofer  s/p 1unit prbc  f/u post transfusion cbc  check stool occult blood  cont current meds Patient is a 89y old  Female who presents with a chief complaint of SOB,  productive cough and fever.      pt seen in tele [ x ], reg med floor [   ], bed [ x ], chair at bedside [   ], a+o x3 [ x ], lethargic [  ],  nad [ x ]        Allergies    No Known Allergies        Vitals    T(F): 97.2 (03-07-18 @ 04:55), Max: 97.5 (03-06-18 @ 07:33)  HR: 73 (03-07-18 @ 04:55) (67 - 73)  BP: 146/76 (03-07-18 @ 04:55) (119/46 - 148/44)  RR: 18 (03-07-18 @ 04:55) (18 - 20)  SpO2: 96% (03-07-18 @ 04:55) (93% - 96%)  Wt(kg): --  CAPILLARY BLOOD GLUCOSE      POCT Blood Glucose.: 234 mg/dL (06 Mar 2018 21:21)      Labs                          7.1    23.9  )-----------( 190      ( 06 Mar 2018 07:04 )             23.1       03-06    142  |  105  |  105<H>  ----------------------------<  151<H>  3.8   |  20<L>  |  3.57<H>    Ca    8.5      06 Mar 2018 07:04  Phos  6.8     03-06  Mg     3.1     03-06        Legionella Antibody: Positive: Test Performed by:  [facility]  [address] [place] [address] (03.05.18 @ 11:40)          .Blood Blood  03-04 @ 21:44   No growth to date.  --  --          Radiology Results      Meds    MEDICATIONS  (STANDING):  ALBUTerol    0.083% 2.5 milliGRAM(s) Nebulizer every 15 minutes  ALBUTerol/ipratropium for Nebulization 3 milliLiter(s) Nebulizer every 6 hours  aspirin enteric coated 81 milliGRAM(s) Oral daily  atorvastatin 20 milliGRAM(s) Oral at bedtime  cefTRIAXone   IVPB 1 Gram(s) IV Intermittent every 24 hours  epoetin anju Injectable 3000 Unit(s) SubCutaneous <User Schedule>  folic acid 1 milliGRAM(s) Oral daily  heparin  Injectable 5000 Unit(s) SubCutaneous every 12 hours  insulin glargine Injectable (LANTUS) 5 Unit(s) SubCutaneous at bedtime  insulin glargine Injectable (LANTUS) 10 Unit(s) SubCutaneous every morning  insulin lispro (HumaLOG) corrective regimen sliding scale   SubCutaneous three times a day before meals  iron sucrose IVPB 100 milliGRAM(s) IV Intermittent every 48 hours  levothyroxine 50 MICROGram(s) Oral daily  methylPREDNISolone sodium succinate Injectable 40 milliGRAM(s) IV Push every 12 hours  sevelamer hydrochloride 800 milliGRAM(s) Oral three times a day with meals      MEDICATIONS  (PRN):  guaiFENesin    Syrup 100 milliGRAM(s) Oral every 6 hours PRN Cough      Physical Exam      Neuro :  no focal deficits  Respiratory: B/L wheeze  CV: RRR, S1S2, no murmurs,   Abdominal: Soft, NT, ND +BS,  Extremities: No edema, + peripheral pulses    ASSESSMENT    Pneumonia  anemia  acute on ckd  electrolyte imbalance  afib  h/o HTN (hypertension)  asthma  CHF (congestive heart failure)  hypothyroid  No significant past surgical history      PLAN    cont tele  f/u echo  cardio f/u noted  cont aspirin  cont rocephin and zithromax  cont atrov and prov nebs,   cont supplemental oxygen,   cont steroids  give solumedrol 60mg iv x1  urine legionella positive  bld cx neg noted above  f/u ucx,   id f/u   pulm f/u  renal f/u  f/u renal sono  cont venofer  s/p 1unit prbc  f/u post transfusion cbc  check stool occult blood  cont current meds Patient is a 89y old  Female who presents with a chief complaint of SOB,  productive cough and fever.      pt seen in tele [ x ], reg med floor [   ], bed [ x ], chair at bedside [   ], a+o x3 [ x ], lethargic [  ],  nad [ x ]        Allergies    No Known Allergies        Vitals    T(F): 97.2 (03-07-18 @ 04:55), Max: 97.5 (03-06-18 @ 07:33)  HR: 73 (03-07-18 @ 04:55) (67 - 73)  BP: 146/76 (03-07-18 @ 04:55) (119/46 - 148/44)  RR: 18 (03-07-18 @ 04:55) (18 - 20)  SpO2: 96% (03-07-18 @ 04:55) (93% - 96%)  Wt(kg): --  CAPILLARY BLOOD GLUCOSE      POCT Blood Glucose.: 234 mg/dL (06 Mar 2018 21:21)      Labs                          7.1    23.9  )-----------( 190      ( 06 Mar 2018 07:04 )             23.1       03-06    142  |  105  |  105<H>  ----------------------------<  151<H>  3.8   |  20<L>  |  3.57<H>    Ca    8.5      06 Mar 2018 07:04  Phos  6.8     03-06  Mg     3.1     03-06        Legionella Antibody: Positive: Test Performed by:  [facility]  [address] [place] [address] (03.05.18 @ 11:40)          .Blood Blood  03-04 @ 21:44   No growth to date.  --  --          Radiology Results      Meds    MEDICATIONS  (STANDING):  ALBUTerol    0.083% 2.5 milliGRAM(s) Nebulizer every 15 minutes  ALBUTerol/ipratropium for Nebulization 3 milliLiter(s) Nebulizer every 6 hours  aspirin enteric coated 81 milliGRAM(s) Oral daily  atorvastatin 20 milliGRAM(s) Oral at bedtime  cefTRIAXone   IVPB 1 Gram(s) IV Intermittent every 24 hours  epoetin anju Injectable 3000 Unit(s) SubCutaneous <User Schedule>  folic acid 1 milliGRAM(s) Oral daily  heparin  Injectable 5000 Unit(s) SubCutaneous every 12 hours  insulin glargine Injectable (LANTUS) 5 Unit(s) SubCutaneous at bedtime  insulin glargine Injectable (LANTUS) 10 Unit(s) SubCutaneous every morning  insulin lispro (HumaLOG) corrective regimen sliding scale   SubCutaneous three times a day before meals  iron sucrose IVPB 100 milliGRAM(s) IV Intermittent every 48 hours  levothyroxine 50 MICROGram(s) Oral daily  methylPREDNISolone sodium succinate Injectable 40 milliGRAM(s) IV Push every 12 hours  sevelamer hydrochloride 800 milliGRAM(s) Oral three times a day with meals      MEDICATIONS  (PRN):  guaiFENesin    Syrup 100 milliGRAM(s) Oral every 6 hours PRN Cough      Physical Exam      Neuro :  no focal deficits  Respiratory: B/L wheeze  CV: RRR, S1S2, no murmurs,   Abdominal: Soft, NT, ND +BS,  Extremities: No edema, + peripheral pulses    ASSESSMENT    Pneumonia  anemia  acute on ckd  electrolyte imbalance  afib  h/o HTN (hypertension)  asthma  CHF (congestive heart failure)  hypothyroid  No significant past surgical history      PLAN    cont tele  f/u echo  cardio f/u noted  cont aspirin  cont rocephin and zithromax  cont atrov and prov nebs,   cont supplemental oxygen,   cont steroids  give solumedrol 60mg iv x1  f/u ct chest  urine legionella positive  bld cx neg noted above  f/u ucx,   id f/u   pulm f/u  renal f/u  f/u renal sono  cont venofer  s/p 1unit prbc  f/u post transfusion cbc  check stool occult blood  cont current meds

## 2018-03-07 NOTE — PROGRESS NOTE ADULT - SUBJECTIVE AND OBJECTIVE BOX
CHIEF COMPLAINT:Patient is a 89y old  Female who presents with a chief complaint of sob. Pt appears comfortable.  	  REVIEW OF SYSTEMS:  [x ] Unable to obtain    PHYSICAL EXAM:  T(C): 36.6 (03-07-18 @ 07:31), Max: 36.6 (03-07-18 @ 07:31)  HR: 69 (03-07-18 @ 07:31) (67 - 73)  BP: 120/49 (03-07-18 @ 07:31) (119/46 - 148/44)  RR: 16 (03-07-18 @ 07:31) (16 - 18)  SpO2: 98% (03-07-18 @ 07:31) (93% - 98%)  Wt(kg): --  I&O's Summary    06 Mar 2018 07:01  -  07 Mar 2018 07:00  --------------------------------------------------------  IN: 550 mL / OUT: 0 mL / NET: 550 mL        Appearance: Normal	  HEENT:   Normal oral mucosa, PERRL, EOMI	  Lymphatic: No lymphadenopathy  Cardiovascular: Normal S1 S2, No JVD, No murmurs, No edema  Respiratory: Lungs clear to auscultation	  Gastrointestinal:  Soft, Non-tender, + BS	  Skin: No rashes, No ecchymoses, No cyanosis	  Extremities: Normal range of motion, No clubbing, cyanosis or edema  Vascular: Peripheral pulses palpable 2+ bilaterally    MEDICATIONS  (STANDING):  ALBUTerol    0.083% 2.5 milliGRAM(s) Nebulizer every 15 minutes  ALBUTerol/ipratropium for Nebulization 3 milliLiter(s) Nebulizer every 6 hours  aspirin enteric coated 81 milliGRAM(s) Oral daily  atorvastatin 20 milliGRAM(s) Oral at bedtime  azithromycin   Tablet 500 milliGRAM(s) Oral daily  cefTRIAXone   IVPB 1 Gram(s) IV Intermittent every 24 hours  epoetin anju Injectable 3000 Unit(s) SubCutaneous <User Schedule>  folic acid 1 milliGRAM(s) Oral daily  heparin  Injectable 5000 Unit(s) SubCutaneous every 12 hours  insulin glargine Injectable (LANTUS) 5 Unit(s) SubCutaneous at bedtime  insulin glargine Injectable (LANTUS) 10 Unit(s) SubCutaneous every morning  insulin lispro (HumaLOG) corrective regimen sliding scale   SubCutaneous three times a day before meals  iron sucrose IVPB 100 milliGRAM(s) IV Intermittent every 48 hours  levothyroxine 50 MICROGram(s) Oral daily  methylPREDNISolone sodium succinate Injectable 40 milliGRAM(s) IV Push every 12 hours  sevelamer hydrochloride 800 milliGRAM(s) Oral three times a day with meals        	  LABS:	 	                      8.2    18.0  )-----------( 189      ( 07 Mar 2018 06:42 )             26.1     03-07    145  |  109<H>  |  116<H>  ----------------------------<  142<H>  3.5   |  23  |  3.64<H>    Ca    8.5      07 Mar 2018 06:42  Phos  6.4     03-07  Mg     3.0     03-07      proBNP: Serum Pro-Brain Natriuretic Peptide: 8534 pg/mL (03-04 @ 12:05)    Lipid Profile: Cholesterol 67  LDL 27  HDL 23  TG 84    HgA1c: Hemoglobin A1C, Whole Blood: 6.0 % (03-04 @ 21:20)    TSH: Thyroid Stimulating Hormone, Serum: 2.12 uU/mL (03-04 @ 18:20)      Iron with Total Binding Capacity (03.04.18 @ 19:08)    % Saturation, Iron: 9 %    Iron - Total Binding Capacity.: 175 ug/dL    Iron Total, Serum: 16 ug/dL    Unsaturated Iron Binding Capacity: 159 ug/dL

## 2018-03-07 NOTE — PROGRESS NOTE ADULT - SUBJECTIVE AND OBJECTIVE BOX
Problem List:  CKD stage 5  Pneumonia right upper lobe.  BC no growth    Moderate to severe pulmonary hypertension on Echocardiogram.    PAST MEDICAL & SURGICAL HISTORY:  HTN (hypertension)  History of thyroid disorder  CHF (congestive heart failure)      PAST MEDICAL & SURGICAL HISTORY:  HTN (hypertension)  History of thyroid disorder  CHF (congestive heart failure)  No significant past surgical history    No Known Allergies      MEDICATIONS  (STANDING):  ALBUTerol    0.083% 2.5 milliGRAM(s) Nebulizer every 15 minutes  ALBUTerol/ipratropium for Nebulization 3 milliLiter(s) Nebulizer every 6 hours  aspirin enteric coated 81 milliGRAM(s) Oral daily  atorvastatin 20 milliGRAM(s) Oral at bedtime  azithromycin   Tablet 500 milliGRAM(s) Oral daily  cefTRIAXone   IVPB 1 Gram(s) IV Intermittent every 24 hours  epoetin anju Injectable 3000 Unit(s) SubCutaneous <User Schedule>  folic acid 1 milliGRAM(s) Oral daily  heparin  Injectable 5000 Unit(s) SubCutaneous every 12 hours  insulin glargine Injectable (LANTUS) 5 Unit(s) SubCutaneous at bedtime  insulin glargine Injectable (LANTUS) 10 Unit(s) SubCutaneous every morning  insulin lispro (HumaLOG) corrective regimen sliding scale   SubCutaneous three times a day before meals  iron sucrose IVPB 100 milliGRAM(s) IV Intermittent every 48 hours  levothyroxine 50 MICROGram(s) Oral daily  methylPREDNISolone sodium succinate Injectable 40 milliGRAM(s) IV Push every 12 hours  sevelamer hydrochloride 800 milliGRAM(s) Oral three times a day with meals    MEDICATIONS  (PRN):  guaiFENesin    Syrup 100 milliGRAM(s) Oral every 6 hours PRN Cough    Comprehensive Metabolic Panel (03.04.18 @ 12:05)    Sodium, Serum: 137 mmol/L    Potassium, Serum: 5.5 mmol/L    Chloride, Serum: 105 mmol/L    Carbon Dioxide, Serum: 22 mmol/L    Anion Gap, Serum: 10 mmol/L    Blood Urea Nitrogen, Serum: 82 mg/dL    Creatinine, Serum: 3.57 mg/dL    Glucose, Serum: 125 mg/dL    Calcium, Total Serum: 8.2 mg/dL    Protein Total, Serum: 7.5 g/dL    Albumin, Serum: 2.5 g/dL    Bilirubin Total, Serum: 0.5 mg/dL    Alkaline Phosphatase, Serum: 165 U/L    Aspartate Aminotransferase (AST/SGOT): 148 U/L    Alanine Aminotransferase (ALT/SGPT): 158 U/L DA    eGFR if Non : 10: Interpretative comment    Mitral Valve: Normal mitral valve. Mild mitral  regurgitation.  Aortic Root: Aortic Root: 3.0 cm.  Aortic Valve: Calcified trileaflet aortic valve with normal  opening.  Left Atrium: Moderate left atrial enlargement.  Left Ventricle: Normal Left Ventricular Systolic Function,  (EF = 55 to 60%) Normal left ventricular internal  dimensions and wall thicknesses. Grade II diastolic  dysfunction.  Right Heart: Moderate right atrial enlargement. Normal  right ventricular size and function. There is moderate  tricuspid regurgitation. There is mild pulmonic  regurgitation.  Pericardium/PleuraNormal pericardium with no pericardial  effusion.  Hemodynamic: RA Pressure is 10 mm Hg. RV systolic pressure  is 60 mm Hg. Moderate-severe pulmonary hypertension.  ------------------------------------------------------------------------  CONCLUSIONS:  1. Normal mitral valve. Mild mitral regurgitation.  2. Calcified trileaflet aortic valve with normal opening.  3. Aortic Root: 3.0 cm.  4. Moderate left atrial enlargement.  5. Normal left ventricular internal dimensions and wall  thicknesses.  6. Normal Left Ventricular Systolic Function,  (EF = 55 to  60%)  7. Grade II diastolic dysfunction.  8. Moderate right atrial enlargement.  9. Normal right ventricular size and function.  10. RA Pressure is 10 mm Hg.  11. RV systolic pressure is 60 mm Hg. Moderate-severe  pulmonary hypertension.  12. There is moderate tricuspid regurgitation.  13. There is mild pulmonic regurgitation.  14. Normal pericardium with no pericardial effusion.                              8.2    18.0  )-----------( 189      ( 07 Mar 2018 06:42 )             26.1     03-07    145  |  109<H>  |  116<H>  ----------------------------<  142<H>  3.5   |  23  |  3.64<H>    Ca    8.5      07 Mar 2018 06:42  Phos  6.4     03-07  Mg     3.0     03-07          Osmolality, Random Urine: 374 mos/kg (03-05 @ 21:44)  Sodium, Random Urine: 22 mmol/L (03-05 @ 21:44)  Potassium, Random Urine: 39 mmol/L (03-05 @ 21:44)  Creatinine, Random Urine: 99 mg/dL (03-05 @ 21:44)      REVIEW OF SYSTEMS:  General: no fever no chills, no diaphoresis.  RESPIRATORY:  cough improved, shortness of breath improved  CARDIOVASCULAR: No chest pain or palpitations. No Edema  GASTROINTESTINAL: No abdominal or epigastric pain. No nausea, vomiting. No diarrhea or constipation. No melena.  GENITOURINARY: No dysuria, frequency, foamy urine, urinary urgency, incontinence or hematuria  NEUROLOGICAL: No numbness or weakness, no tremor , no dizziness.   Muscle skeletal : no joint pain and no swelling of joints and limbs.  SKIN: No itching, burning, rashes.        VITALS:  T(F): 97.9 (03-07-18 @ 07:31), Max: 97.9 (03-07-18 @ 07:31)  HR: 69 (03-07-18 @ 07:31)  BP: 120/49 (03-07-18 @ 07:31)  RR: 16 (03-07-18 @ 07:31)  SpO2: 98% (03-07-18 @ 07:31)  Wt(kg): --    03-06 @ 07:01  -  03-07 @ 07:00  --------------------------------------------------------  IN: 550 mL / OUT: 0 mL / NET: 550 mL    03-07 @ 07:01  -  03-07 @ 11:26  --------------------------------------------------------  IN: 150 mL / OUT: 0 mL / NET: 150 mL        PHYSICAL EXAM:  Constitutional: well developed, no diaphoresis, no distress. Supine in bed  Neck: No JVD, no carotid bruit, supple, no adenopathy  Respiratory: Good air entrance B/L, no wheezes, rales or rhonchi  Cardiovascular: S1, S2, RRR, no pericardial rub, no murmur  Abdomen: BS+, soft, no tenderness, no bruit  Pelvis: bladder nondistended  Extremities: No cyanosis or clubbing. No peripheral edema.   Alert and awake

## 2018-03-07 NOTE — PROGRESS NOTE ADULT - PROBLEM SELECTOR PLAN 1
Patient presented with leukocytosis, fevers, productive cough and consolidation on CXR suggestive of Pneumonia  RVP non detected  procalcitonin is 1.02  c/w rocephin and azithromycin  - Urine legionella antibody positive  -Blood culture nils  F/u Urine legionella  ID Dr. Chen

## 2018-03-07 NOTE — PROGRESS NOTE ADULT - SUBJECTIVE AND OBJECTIVE BOX
Meds:  cefTRIAXone   IVPB 1 Gram(s) IV Intermittent every 24 hours    Allergies:  Allergies    No Known Allergies    Intolerances      ROS  [  ] UNABLE TO ELICIT    General:  [  ] None  [ x ] Fever  [  ] Chills  [  ] Malaise    Skin:  [ x ] None [  ] Rash  [  ] Wound  [  ] Ulcer    HEENT:  [ x ] None  [  ] Sore Throat  [  ] Nasal congestion/ runny nose  [  ] Photophobia [  ] Neck pain      Chest:  [  ] None   [ x ] SOB  [ x ] Cough  [  ] None    Cardiovascular:   [ x ] None  [  ] CP  [  ] Palpitation    Gastrointestinal:  [ x ] None  [  ] Abd pain   [  ] Nausea    [  ] Vomiting   [  ] Diarrhea	     Genitourinary:  [ x ] None [  ] Polyuria   [  ] Urgency  [  ] Frequency  [  ] Dysuria    [  ]  Hematuria       Musculoskeletal:  [  ] None [  ] Back Pain	[  ] Body aches  [  ] Joint pain [ x ] legs edema    Neurological: [  ] None [  ]Dizziness  [  ]Visual Disturbance  [  ]Headaches   [ x ] Weakness          PHYSICAL EXAM:  Vital Signs Last 24 Hrs  T(C): 36.6 (07 Mar 2018 07:31), Max: 36.6 (07 Mar 2018 07:31)  T(F): 97.9 (07 Mar 2018 07:31), Max: 97.9 (07 Mar 2018 07:31)  HR: 69 (07 Mar 2018 07:31) (67 - 73)  BP: 120/49 (07 Mar 2018 07:31) (119/46 - 148/44)  BP(mean): --  RR: 16 (07 Mar 2018 07:31) (16 - 18)  SpO2: 98% (07 Mar 2018 07:31) (93% - 98%)    Constitutional:    HEENT: [ x ] Wnl  [  ] Pharyngeal congestion    Neck:  [  ] Supple  [  ]Lymphadenopathy  [  ] No JVD   [  ] JVD  [  ] Masses   [  ] WNL    CHEST/Respiratory:  [  ]Clear to auscultation  [ x ] Rales   [  ] Rhonchi   [ x ] Wheezing     [  ] Chest Tenderness      Cardiovascular:  [ x ] Reg S1 S2   [  ] Irreg S1 S2   [ x ]No Murmur  [  ] +ve Murmurs  [  ]Systolic [  ]Diastolic      Abdomen:  [ x ] Soft  [  ] No tendrerness  [  ] Tenderness  [  ] Organomegaly  [  ] ABD Distention  [  ] Rigidity                       [ x ] No Regidity                       [ x ] No Rebound Tenderness  [  ] No Guarding Rigidity  [  ] Rebound Tenderness[  ] Guarding Rigidity                          [ x ]  +ve Bowel Sounds  [  ] Decreased Bowel Sounds    [  ] Absent Bowel Sounds                            Extremities: [  ] No edema [ x ] Edema  [  ] Clubbing   [  ] Cyanosis                         [ x ] No Tender Calf muscles  [  ] Tender Calf muscles                        [ x ] Palpable peripheral pulses    Neurological: [ x ] Awake  [ x ] Alert  [  ] Oriented  x                             [  ] Confused  [  ] Drowsy  [  ] respond to painful stimuli  [  ] Unresponsive    Skin:  [ x ] Intact [  ] Redness [  ] Thrombophlebitis  [  ] Rashes  [  ] Dry  [  ] Ulcers    Ortho:  [  ] Joint Swelling  [  ] Joint erythema [  ] Joint tenderness                [  ] Increased temp. to touch  [  ] DJD [ x ] WNL          LABS/DIAGNOSTIC TESTS                        8.2    18.0  )-----------( 189      ( 07 Mar 2018 06:42 )             26.1   03-07    145  |  109<H>  |  116<H>  ----------------------------<  142<H>  3.5   |  23  |  3.64<H>    Ca    8.5      07 Mar 2018 06:42  Phos  6.4     03-07  Mg     3.0     03-07    Procalcitonin, Serum: 0.94: Procalcitonin (PCT) Interpretation (ng/mL) - Diagnosis of systemic  bacterial infection/sepsis  PCT < 0.5: Systemic infection (sepsis) is not likely and risk for  progression to severe systemic infection is low. Local bacterial  infection is possible. If early sepsis is suspected clinically, PCT  should be re-assessed in 6-24 hours.  PCT >/= 0.5 but < 2.0: Systemic infection (sepsis) is possible, but other  conditions are known to elevate PCT as well. Moderate risk for  progression to severe systemic infection. The patient should be closely  monitored both clinically and by re-assessing PCT within 6-24 hours.  PCT >/= 2.0 but < 10.0: Systemic infection (sepsis) is likely, unless  other causes are known. High risk of progression to severe systemic  infection (severe sepsis/septic shock).  PCT >/= 10.0: Important systemic inflammatory response, almost  exclusively due to severe bacterial sepsis or septic shock. High  likelihood of severe sepsis or septic shock. ng/mL (03.06.18 @ 04:30)          CULTURES:   Culture - Blood (03.04.18 @ 21:44)    Specimen Source: .Blood Blood    Culture Results:   No growth to date.    Culture - Blood (03.04.18 @ 21:44)    Specimen Source: .Blood Blood    Culture Results:   No growth to date.        RADIOLOGY    CXR:    EXAM:  XR CHEST PORTABLE IMMED 1V                            PROCEDURE DATE:  03/04/2018          INTERPRETATION:  CLINICAL INFORMATION: Shortness of breath.    A single portable view of the chest was obtained.     Comparison: None.     The mediastinal cardiac silhouette is unremarkable.    Large peripheral consolidation in the right upper lobe. In the proper   clinical setting this could represent infection. Correlate clinically and   follow to resolution.  Blunting of the right costophrenic angle question atelectasis and/or   small effusion.  Clear left lung.     No acute osseous finding.     IMPRESSION:    As above.          EXAM:  US DPLX LWR EXT VEINS LTD LT                            PROCEDURE DATE:  03/04/2018          INTERPRETATION:  CLINICAL INFORMATION: Left Lower extremity swelling.    Ultrasonography of the left lower extremity common femoral, superficial   femoral, popliteal, and deep calf veins was performed using gray scale   and duplex color spectral Doppler imaging.     Comparison: None.    The deep veins of the left lower extremity show normal gray scale   appearance and compressibility.    Duplex Doppler evaluation shows normal waveforms throughout and normal   augmentation.    Mild subcutaneous edema.    IMPRESSION:    No evidence for deep vein thrombosis in the left lower extremity.        Assessment and Recommendation:   89 y/o F from home with HHA  walks with walker w/ PMHx of CHF unknown EF, CKD ( as per daughter one of her kidneys is not very functional), asthma, anemia,  hypothyroid, and HTN BIB EMS for SOB,  productive cough and fever.  Patient was admitted for pneumonia and CHF and was started on IV Rocephin.    Problem/Recommendation - 1:  Problem: Pneumonia.   Recommendation:   1- Continue Rocephin.  2- procalcitonin level is improving.  3- O2 as needed.  4- Pulmonary management.  5- F/u CXR.  6- Follow blood cultures till final reports.    Problem/Recommendation - 2:  ·  Problem: COPD exacerbation.    Recommendation:   1- Bronchodilators.  2- O2 as needed.  3- Pulmonary evaluation and management.  4- Steroids as per primary, and pulmonary team.     Problem/Recommendation - 3:  ·  Problem: Anemia.    Recommendation:   1- Closely monitor H & H.  2- Observe for bleeding.     Problem/Recommendation - 4:  ·  Problem: CHF (congestive heart failure).    Recommendation:   1- Monitor Blood pressure closely.  2- Blood pressure control.  3- BP. and cardiac meds and management as per cardiology and primary care team.     Discussed with medical resident.

## 2018-03-07 NOTE — PROGRESS NOTE ADULT - SUBJECTIVE AND OBJECTIVE BOX
Patient is a 89y old  Female who presents with a chief complaint of sob and wheezing.  Awake, alert, comfortable in bed in [facility]. For chest CT today.    INTERVAL HPI/OVERNIGHT EVENTS:      VITAL SIGNS:  T(F): 98.9 (03-07-18 @ 12:08)  HR: 73 (03-07-18 @ 12:08)  BP: 141/48 (03-07-18 @ 12:08)  RR: 16 (03-07-18 @ 12:08)  SpO2: 98% (03-07-18 @ 12:08)  Wt(kg): --  I&O's Detail    06 Mar 2018 07:01  -  07 Mar 2018 07:00  --------------------------------------------------------  IN:    IV PiggyBack: 50 mL    Oral Fluid: 150 mL    Packed Red Blood Cells: 350 mL  Total IN: 550 mL    OUT:  Total OUT: 0 mL    Total NET: 550 mL      07 Mar 2018 07:01  -  07 Mar 2018 14:49  --------------------------------------------------------  IN:    Oral Fluid: 150 mL  Total IN: 150 mL    OUT:  Total OUT: 0 mL    Total NET: 150 mL              REVIEW OF SYSTEMS:    CONSTITUTIONAL:  No fevers, chills, sweats    HEENT:  Eyes:  No diplopia or blurred vision. ENT:  No earache, sore throat or runny nose.    CARDIOVASCULAR:  No pressure, squeezing, tightness, or heaviness about the chest; no palpitations.    RESPIRATORY:  Per HPI    GASTROINTESTINAL:  No abdominal pain, nausea, vomiting or diarrhea.    GENITOURINARY:  No dysuria, frequency or urgency.    NEUROLOGIC:  No paresthesias, fasciculations, seizures or weakness.    PSYCHIATRIC:  No disorder of thought or mood.      PHYSICAL EXAM:    Constitutional: Well developed and nourished  Eyes:Perrla  ENMT: normal  Neck:supple  Respiratory: fine wheezes anteriorly  Cardiovascular: S1 S2 regular  Gastrointestinal: Soft, Non tender  Extremities: No edema  Vascular:normal  Neurological:Awake, alert,Ox3  Musculoskeletal:Normal      MEDICATIONS  (STANDING):  ALBUTerol    0.083% 2.5 milliGRAM(s) Nebulizer every 15 minutes  ALBUTerol/ipratropium for Nebulization 3 milliLiter(s) Nebulizer every 6 hours  aspirin enteric coated 81 milliGRAM(s) Oral daily  atorvastatin 20 milliGRAM(s) Oral at bedtime  azithromycin   Tablet 500 milliGRAM(s) Oral daily  cefTRIAXone   IVPB 1 Gram(s) IV Intermittent every 24 hours  epoetin anju Injectable 3000 Unit(s) SubCutaneous <User Schedule>  folic acid 1 milliGRAM(s) Oral daily  heparin  Injectable 5000 Unit(s) SubCutaneous every 12 hours  insulin glargine Injectable (LANTUS) 5 Unit(s) SubCutaneous at bedtime  insulin glargine Injectable (LANTUS) 10 Unit(s) SubCutaneous every morning  insulin lispro (HumaLOG) corrective regimen sliding scale   SubCutaneous three times a day before meals  iron sucrose IVPB 100 milliGRAM(s) IV Intermittent every 48 hours  levothyroxine 50 MICROGram(s) Oral daily  methylPREDNISolone sodium succinate Injectable 40 milliGRAM(s) IV Push every 12 hours  sevelamer hydrochloride 800 milliGRAM(s) Oral three times a day with meals    MEDICATIONS  (PRN):  guaiFENesin    Syrup 100 milliGRAM(s) Oral every 6 hours PRN Cough      Allergies    No Known Allergies    Intolerances        LABS:                        8.2    18.0  )-----------( 189      ( 07 Mar 2018 06:42 )             26.1     03-07    145  |  109<H>  |  116<H>  ----------------------------<  142<H>  3.5   |  23  |  3.64<H>    Ca    8.5      07 Mar 2018 06:42  Phos  6.4     03-07  Mg     3.0     03-07                CAPILLARY BLOOD GLUCOSE      POCT Blood Glucose.: 196 mg/dL (07 Mar 2018 11:46)  POCT Blood Glucose.: 138 mg/dL (07 Mar 2018 08:44)  POCT Blood Glucose.: 138 mg/dL (07 Mar 2018 07:28)  POCT Blood Glucose.: 234 mg/dL (06 Mar 2018 21:21)  POCT Blood Glucose.: 199 mg/dL (06 Mar 2018 16:33)    pro-bnp [id number] 03-04 @ 12:05     d-dimer --  03-04 @ 12:05      RADIOLOGY & ADDITIONAL TESTS:    CXR:  < from: Xray Chest 1 View-PORTABLE IMMEDIATE (03.04.18 @ 11:52) >  The mediastinal cardiac silhouette is unremarkable.    Large peripheral consolidation in the right upper lobe. In the proper   clinical setting this could represent infection. Correlate clinically and   follow to resolution.  Blunting of the right costophrenic angle question atelectasis and/or   small effusion.  Clear left lung.     No acute osseous finding.     < end of copied text >    Ct scan chest:    ekg;    echo:

## 2018-03-07 NOTE — PROGRESS NOTE ADULT - PROBLEM SELECTOR PLAN 4
Pt with worsening LE pedal edema  and JVD, EKG with Sinus lianet @ 57 with junctional rhythm. pt was offered peacemaker in the past but was declined due to age on admission  will start PO lasix  No BB patient is bradycardiac  Cardio: Dr Benjamin

## 2018-03-07 NOTE — PROGRESS NOTE ADULT - SUBJECTIVE AND OBJECTIVE BOX
Patient is a 89y old  Female who presents with a chief complaint of     INTERVAL HPI/OVERNIGHT EVENTS: No major overnight events    MEDICATIONS  (STANDING):  ALBUTerol    0.083% 2.5 milliGRAM(s) Nebulizer every 15 minutes  ALBUTerol/ipratropium for Nebulization 3 milliLiter(s) Nebulizer every 6 hours  aspirin enteric coated 81 milliGRAM(s) Oral daily  atorvastatin 20 milliGRAM(s) Oral at bedtime  azithromycin   Tablet 500 milliGRAM(s) Oral daily  cefTRIAXone   IVPB 1 Gram(s) IV Intermittent every 24 hours  epoetin anju Injectable 3000 Unit(s) SubCutaneous <User Schedule>  folic acid 1 milliGRAM(s) Oral daily  heparin  Injectable 5000 Unit(s) SubCutaneous every 12 hours  insulin glargine Injectable (LANTUS) 5 Unit(s) SubCutaneous at bedtime  insulin glargine Injectable (LANTUS) 10 Unit(s) SubCutaneous every morning  insulin lispro (HumaLOG) corrective regimen sliding scale   SubCutaneous three times a day before meals  iron sucrose IVPB 100 milliGRAM(s) IV Intermittent every 48 hours  levothyroxine 50 MICROGram(s) Oral daily  methylPREDNISolone sodium succinate Injectable 40 milliGRAM(s) IV Push every 12 hours  sevelamer hydrochloride 800 milliGRAM(s) Oral three times a day with meals    MEDICATIONS  (PRN):  guaiFENesin    Syrup 100 milliGRAM(s) Oral every 6 hours PRN Cough      Allergies    No Known Allergies    Intolerances        REVIEW OF SYSTEMS:  CONSTITUTIONAL: No fever, weight loss, or fatigue  RESPIRATORY: No cough, wheezing, chills or hemoptysis; No shortness of breath  CARDIOVASCULAR: No chest pain, palpitations, dizziness, or leg swelling  GASTROINTESTINAL: No abdominal or epigastric pain. No nausea, vomiting, or hematemesis; No diarrhea or constipation. No melena or hematochezia.  NEUROLOGICAL: No headaches, memory loss, loss of strength, numbness, or tremors  SKIN: No itching, burning, rashes, or lesions     Vital Signs Last 24 Hrs  T(C): 36.3 (07 Mar 2018 19:50), Max: 37.2 (07 Mar 2018 12:08)  T(F): 97.4 (07 Mar 2018 19:50), Max: 98.9 (07 Mar 2018 12:08)  HR: 76 (07 Mar 2018 19:50) (69 - 76)  BP: 151/47 (07 Mar 2018 19:50) (120/49 - 152/49)  BP(mean): --  RR: 17 (07 Mar 2018 19:50) (16 - 18)  SpO2: 99% (07 Mar 2018 19:50) (96% - 100%)    GENERAL: NAD,   HEAD:  Atraumatic, Normocephalic  EYES:  Conjunctiva and sclera clear  NECK: Supple, No JVD,   CHEST/LUNG: Mild wheezing, Clear to percussion bilaterally; No rales, rhonchi, wheezing, or rubs  HEART: Regular rate and rhythm; No murmurs, rubs, or gallops  ABDOMEN: Soft, Nontender, Nondistended; Bowel sounds present  NERVOUS SYSTEM:  Alert & Oriented X2   EXTREMITIES:  2+ Peripheral Pulses, No clubbing, cyanosis, or edema  SKIN; warm, dry    LABS:                        8.2    18.0  )-----------( 189      ( 07 Mar 2018 06:42 )             26.1     03-07    145  |  109<H>  |  116<H>  ----------------------------<  142<H>  3.5   |  23  |  3.64<H>    Ca    8.5      07 Mar 2018 06:42  Phos  6.4     03-07  Mg     3.0     03-07          CAPILLARY BLOOD GLUCOSE      POCT Blood Glucose.: 181 mg/dL (07 Mar 2018 21:05)  POCT Blood Glucose.: 173 mg/dL (07 Mar 2018 16:05)  POCT Blood Glucose.: 196 mg/dL (07 Mar 2018 11:46)  POCT Blood Glucose.: 138 mg/dL (07 Mar 2018 08:44)  POCT Blood Glucose.: 138 mg/dL (07 Mar 2018 07:28)      RADIOLOGY & ADDITIONAL TESTS:    Imaging Personally Reviewed:  [ ] YES  [ ] NO    Consultant(s) Notes Reviewed:  [ ] YES  [ ] NO

## 2018-03-07 NOTE — PROGRESS NOTE ADULT - ASSESSMENT
89 y/o F from home with HHA  walks with walker w/ PMHx of CHF unknown EF, CKD ( as per daughter one of her kidneys is not very functional), asthma, anemia,  hypothyroid, and HTN BIB EMS for SOB,  productive cough , fever and afib.  1.Fe def anemia-IV Iron  2.Abx and steroids.  3.Echocardiogram.  4.Afib-asa.  5.Hypothyroidism-synthroid.  6.GI and DVT prophylaxis.

## 2018-03-07 NOTE — PROGRESS NOTE ADULT - ASSESSMENT
89 years old with CKD stage 5  Hyperkalemia on admission resolved to follow 2 gm K diet and see k in am.   Today K improved still less than 4.0 .  Increased BUN at present due to steroids patient on Methylprednisolone and possible mild hypovolemia.  Increased in creatinine may reflects mild hypovolemia.  Suggest to hold diuretics and attempt to increase her fluids po intake.  Liberalize K diet.        Anemia low iron saturation.  Start Venofer.    Pulmonary hypertension.    Renal bone disease with increased PO4 on Renagel .

## 2018-03-08 NOTE — PROGRESS NOTE ADULT - SUBJECTIVE AND OBJECTIVE BOX
Patient is a 89y old  Female who presents with a chief complaint of     INTERVAL HPI/OVERNIGHT EVENTS: No major overnight events    MEDICATIONS  (STANDING):  ALBUTerol    0.083% 2.5 milliGRAM(s) Nebulizer every 15 minutes  ALBUTerol/ipratropium for Nebulization 3 milliLiter(s) Nebulizer every 6 hours  amLODIPine   Tablet 2.5 milliGRAM(s) Oral daily  aspirin enteric coated 81 milliGRAM(s) Oral daily  atorvastatin 20 milliGRAM(s) Oral at bedtime  azithromycin   Tablet 500 milliGRAM(s) Oral daily  cefTRIAXone   IVPB 1 Gram(s) IV Intermittent every 24 hours  epoetin anju Injectable 3000 Unit(s) SubCutaneous <User Schedule>  folic acid 1 milliGRAM(s) Oral daily  heparin  Injectable 5000 Unit(s) SubCutaneous every 12 hours  insulin glargine Injectable (LANTUS) 5 Unit(s) SubCutaneous at bedtime  insulin glargine Injectable (LANTUS) 10 Unit(s) SubCutaneous every morning  insulin lispro (HumaLOG) corrective regimen sliding scale   SubCutaneous three times a day before meals  iron sucrose IVPB 100 milliGRAM(s) IV Intermittent every 48 hours  levothyroxine 50 MICROGram(s) Oral daily  methylPREDNISolone sodium succinate Injectable 40 milliGRAM(s) IV Push every 12 hours  sevelamer hydrochloride 800 milliGRAM(s) Oral three times a day with meals    MEDICATIONS  (PRN):  guaiFENesin    Syrup 100 milliGRAM(s) Oral every 6 hours PRN Cough      Allergies    No Known Allergies    Intolerances        REVIEW OF SYSTEMS:  CONSTITUTIONAL: No fever, weight loss, or fatigue  RESPIRATORY: No cough, wheezing, chills or hemoptysis; No shortness of breath  CARDIOVASCULAR: No chest pain, palpitations, dizziness, or leg swelling  GASTROINTESTINAL: No abdominal or epigastric pain. No nausea, vomiting, or hematemesis; No diarrhea or constipation. No melena or hematochezia.  NEUROLOGICAL: No headaches, memory loss, loss of strength, numbness, or tremors  SKIN: No itching, burning, rashes, or lesions     Vital Signs Last 24 Hrs  T(C): 37.4 (08 Mar 2018 11:08), Max: 37.4 (08 Mar 2018 11:08)  T(F): 99.3 (08 Mar 2018 11:08), Max: 99.3 (08 Mar 2018 11:08)  HR: 72 (08 Mar 2018 11:08) (70 - 76)  BP: 143/48 (08 Mar 2018 11:08) (138/56 - 156/46)  BP(mean): --  RR: 20 (08 Mar 2018 11:08) (16 - 22)  SpO2: 100% (08 Mar 2018 11:08) (96% - 100%)    PHYSICAL EXAM:  GENERAL: NAD, well-groomed, well-developed  HEAD:  Atraumatic, Normocephalic  EYES: EOMI, PERRLA, conjunctiva and sclera clear  NECK: Supple, No JVD, Normal thyroid  CHEST/LUNG: Clear to percussion bilaterally; No rales, rhonchi, wheezing, or rubs  HEART: Regular rate and rhythm; No murmurs, rubs, or gallops  ABDOMEN: Soft, Nontender, Nondistended; Bowel sounds present  NERVOUS SYSTEM:  Alert & Oriented X3, Good concentration; Motor Strength 5/5 B/L   EXTREMITIES:  2+ Peripheral Pulses, No clubbing, cyanosis, or edema  SKIN;    LABS:                        8.2    18.0  )-----------( 189      ( 07 Mar 2018 06:42 )             26.1     03-07    145  |  109<H>  |  116<H>  ----------------------------<  142<H>  3.5   |  23  |  3.64<H>    Ca    8.5      07 Mar 2018 06:42  Phos  6.4     03-07  Mg     3.0     03-07          CAPILLARY BLOOD GLUCOSE      POCT Blood Glucose.: 197 mg/dL (08 Mar 2018 11:29)  POCT Blood Glucose.: 130 mg/dL (08 Mar 2018 07:48)  POCT Blood Glucose.: 181 mg/dL (07 Mar 2018 21:05)  POCT Blood Glucose.: 173 mg/dL (07 Mar 2018 16:05)      RADIOLOGY & ADDITIONAL TESTS:    Imaging Personally Reviewed:  [ ] YES  [ ] NO    Consultant(s) Notes Reviewed:  [ ] YES  [ ] NO Patient is a 89y old  Female who presents with a chief complaint of     INTERVAL HPI/OVERNIGHT EVENTS: No major overnight events. Patient seen at bedside, was in mild distress.    MEDICATIONS  (STANDING):  ALBUTerol    0.083% 2.5 milliGRAM(s) Nebulizer every 15 minutes  ALBUTerol/ipratropium for Nebulization 3 milliLiter(s) Nebulizer every 6 hours  amLODIPine   Tablet 2.5 milliGRAM(s) Oral daily  aspirin enteric coated 81 milliGRAM(s) Oral daily  atorvastatin 20 milliGRAM(s) Oral at bedtime  azithromycin   Tablet 500 milliGRAM(s) Oral daily  cefTRIAXone   IVPB 1 Gram(s) IV Intermittent every 24 hours  epoetin anju Injectable 3000 Unit(s) SubCutaneous <User Schedule>  folic acid 1 milliGRAM(s) Oral daily  heparin  Injectable 5000 Unit(s) SubCutaneous every 12 hours  insulin glargine Injectable (LANTUS) 5 Unit(s) SubCutaneous at bedtime  insulin glargine Injectable (LANTUS) 10 Unit(s) SubCutaneous every morning  insulin lispro (HumaLOG) corrective regimen sliding scale   SubCutaneous three times a day before meals  iron sucrose IVPB 100 milliGRAM(s) IV Intermittent every 48 hours  levothyroxine 50 MICROGram(s) Oral daily  methylPREDNISolone sodium succinate Injectable 40 milliGRAM(s) IV Push every 12 hours  sevelamer hydrochloride 800 milliGRAM(s) Oral three times a day with meals    MEDICATIONS  (PRN):  guaiFENesin    Syrup 100 milliGRAM(s) Oral every 6 hours PRN Cough      Allergies    No Known Allergies    Intolerances      REVIEW OF SYSTEMS:  CONSTITUTIONAL: No fever, weight loss, or fatigue  RESPIRATORY:  cough, wheezing, No chills or hemoptysis; No shortness of breath  CARDIOVASCULAR: No chest pain, palpitations, dizziness, or leg swelling  GASTROINTESTINAL: No abdominal or epigastric pain. No nausea, vomiting, or hematemesis; No diarrhea or constipation. No melena or hematochezia.  NEUROLOGICAL: No headaches, memory loss, loss of strength, numbness, or tremors  SKIN: No itching, burning, rashes, or lesions     Vital Signs Last 24 Hrs  T(C): 37.4 (08 Mar 2018 11:08), Max: 37.4 (08 Mar 2018 11:08)  T(F): 99.3 (08 Mar 2018 11:08), Max: 99.3 (08 Mar 2018 11:08)  HR: 72 (08 Mar 2018 11:08) (70 - 76)  BP: 143/48 (08 Mar 2018 11:08) (138/56 - 156/46)  BP(mean): --  RR: 20 (08 Mar 2018 11:08) (16 - 22)  SpO2: 100% (08 Mar 2018 11:08) (96% - 100%)    PHYSICAL EXAM:  GENERAL: NAD, well-groomed, well-developed  HEAD:  Atraumatic, Normocephalic  EYES: EOMI, PERRLA, conjunctiva and sclera clear  NECK: Supple, No JVD, Normal thyroid  CHEST/LUNG: Clear to percussion bilaterally; No rales, rhonchi, wheezing, or rubs  HEART: Regular rate and rhythm; No murmurs, rubs, or gallops  ABDOMEN: Soft, Nontender, Nondistended; Bowel sounds present  NERVOUS SYSTEM:  Alert & Oriented X3, Good concentration; Motor Strength 5/5 B/L   EXTREMITIES:  2+ Peripheral Pulses, No clubbing, cyanosis, or edema  SKIN;    LABS:                        8.2    18.0  )-----------( 189      ( 07 Mar 2018 06:42 )             26.1     03-07    145  |  109<H>  |  116<H>  ----------------------------<  142<H>  3.5   |  23  |  3.64<H>    Ca    8.5      07 Mar 2018 06:42  Phos  6.4     03-07  Mg     3.0     03-07          CAPILLARY BLOOD GLUCOSE      POCT Blood Glucose.: 197 mg/dL (08 Mar 2018 11:29)  POCT Blood Glucose.: 130 mg/dL (08 Mar 2018 07:48)  POCT Blood Glucose.: 181 mg/dL (07 Mar 2018 21:05)  POCT Blood Glucose.: 173 mg/dL (07 Mar 2018 16:05)      RADIOLOGY & ADDITIONAL TESTS:    Imaging Personally Reviewed:  [ ] YES  [ ] NO    Consultant(s) Notes Reviewed:  [ ] YES  [ ] NO Patient is a 89y old  Female who presents with a chief complaint of     INTERVAL HPI/OVERNIGHT EVENTS: No major overnight events. Patient seen at bedside, was in mild distress.    MEDICATIONS  (STANDING):  ALBUTerol    0.083% 2.5 milliGRAM(s) Nebulizer every 15 minutes  ALBUTerol/ipratropium for Nebulization 3 milliLiter(s) Nebulizer every 6 hours  amLODIPine   Tablet 2.5 milliGRAM(s) Oral daily  aspirin enteric coated 81 milliGRAM(s) Oral daily  atorvastatin 20 milliGRAM(s) Oral at bedtime  azithromycin   Tablet 500 milliGRAM(s) Oral daily  cefTRIAXone   IVPB 1 Gram(s) IV Intermittent every 24 hours  epoetin anju Injectable 3000 Unit(s) SubCutaneous <User Schedule>  folic acid 1 milliGRAM(s) Oral daily  heparin  Injectable 5000 Unit(s) SubCutaneous every 12 hours  insulin glargine Injectable (LANTUS) 5 Unit(s) SubCutaneous at bedtime  insulin glargine Injectable (LANTUS) 10 Unit(s) SubCutaneous every morning  insulin lispro (HumaLOG) corrective regimen sliding scale   SubCutaneous three times a day before meals  iron sucrose IVPB 100 milliGRAM(s) IV Intermittent every 48 hours  levothyroxine 50 MICROGram(s) Oral daily  methylPREDNISolone sodium succinate Injectable 40 milliGRAM(s) IV Push every 12 hours  sevelamer hydrochloride 800 milliGRAM(s) Oral three times a day with meals    MEDICATIONS  (PRN):  guaiFENesin    Syrup 100 milliGRAM(s) Oral every 6 hours PRN Cough      Allergies    No Known Allergies    Intolerances      REVIEW OF SYSTEMS:  CONSTITUTIONAL: No fever, weight loss, or fatigue  RESPIRATORY:  cough, wheezing, No chills or hemoptysis; No shortness of breath  CARDIOVASCULAR: No chest pain, palpitations, dizziness, or leg swelling  GASTROINTESTINAL: No abdominal or epigastric pain. No nausea, vomiting, or hematemesis; No diarrhea or constipation. No melena or hematochezia.  NEUROLOGICAL: No headaches, memory loss, loss of strength, numbness, or tremors  SKIN: No itching, burning, rashes, or lesions     Vital Signs Last 24 Hrs  T(C): 37.4 (08 Mar 2018 11:08), Max: 37.4 (08 Mar 2018 11:08)  T(F): 99.3 (08 Mar 2018 11:08), Max: 99.3 (08 Mar 2018 11:08)  HR: 72 (08 Mar 2018 11:08) (70 - 76)  BP: 143/48 (08 Mar 2018 11:08) (138/56 - 156/46)  BP(mean): --  RR: 20 (08 Mar 2018 11:08) (16 - 22)  SpO2: 100% (08 Mar 2018 11:08) (96% - 100%)    PHYSICAL EXAM:  GENERAL: NAD,   HEAD:  Atraumatic, Normocephalic  EYES:  Conjunctiva and sclera clear  NECK: Supple, No JVD,   CHEST/LUNG: Mild wheezing, Clear to percussion bilaterally; No rales, rhonchi, wheezing, or rubs  HEART: Regular rate and rhythm; No murmurs, rubs, or gallops  ABDOMEN: Soft, Nontender, Nondistended; Bowel sounds present  NERVOUS SYSTEM:  Alert & Oriented X2   EXTREMITIES:  2+ Peripheral Pulses, No clubbing, cyanosis, or edema  SKIN; warm, dry    LABS:                        8.2    18.0  )-----------( 189      ( 07 Mar 2018 06:42 )             26.1     03-07    145  |  109<H>  |  116<H>  ----------------------------<  142<H>  3.5   |  23  |  3.64<H>    Ca    8.5      07 Mar 2018 06:42  Phos  6.4     03-07  Mg     3.0     03-07          CAPILLARY BLOOD GLUCOSE      POCT Blood Glucose.: 197 mg/dL (08 Mar 2018 11:29)  POCT Blood Glucose.: 130 mg/dL (08 Mar 2018 07:48)  POCT Blood Glucose.: 181 mg/dL (07 Mar 2018 21:05)  POCT Blood Glucose.: 173 mg/dL (07 Mar 2018 16:05)      RADIOLOGY & ADDITIONAL TESTS:    Imaging Personally Reviewed:  [ ] YES  [ ] NO    Consultant(s) Notes Reviewed:  [ ] YES  [ ] NO

## 2018-03-08 NOTE — DIETITIAN INITIAL EVALUATION ADULT. - OTHER INFO
nutrition consult verbally requested by Nephrologist for K, PO4 modified diet; lives home with family, HHA help; denied GI distress, chewing or swallowing problem at present, no specific food choices reported; varied intake 40 to 90% per flow sheets; unsure recent we changes

## 2018-03-08 NOTE — PROGRESS NOTE ADULT - PROBLEM SELECTOR PLAN 3
most likely 2/2 PNA  Patient had mild wheezing today  c/w Duoneb  IV steroids BID  given one extra dose of sterids today

## 2018-03-08 NOTE — DIETITIAN INITIAL EVALUATION ADULT. - NS FNS WEIGHT USED FOR CALC
ideal/Ht=5' (per family)   RVG=619 lb   Admission mc=017 lb   BMI= Ht=5' (per family)   SGY=683 lb   Admission fo=737 lb   BMI=28.3/ideal

## 2018-03-08 NOTE — PROGRESS NOTE ADULT - SUBJECTIVE AND OBJECTIVE BOX
Problem List:  Problem List:  CKD stage 5  Pneumonia right upper lobe and acute bronchitis  Pulmonary hypertension  Anemia    PAST MEDICAL & SURGICAL HISTORY:  HTN (hypertension)  History of thyroid disorder  CHF (congestive heart failure)  No significant past surgical history      No Known Allergies      MEDICATIONS  (STANDING):  ALBUTerol    0.083% 2.5 milliGRAM(s) Nebulizer every 15 minutes  ALBUTerol/ipratropium for Nebulization 3 milliLiter(s) Nebulizer every 6 hours  amLODIPine   Tablet 2.5 milliGRAM(s) Oral daily  aspirin enteric coated 81 milliGRAM(s) Oral daily  atorvastatin 20 milliGRAM(s) Oral at bedtime  azithromycin   Tablet 500 milliGRAM(s) Oral daily  cefTRIAXone   IVPB 1 Gram(s) IV Intermittent every 24 hours  epoetin anju Injectable 3000 Unit(s) SubCutaneous <User Schedule>  folic acid 1 milliGRAM(s) Oral daily  heparin  Injectable 5000 Unit(s) SubCutaneous every 12 hours  insulin glargine Injectable (LANTUS) 5 Unit(s) SubCutaneous at bedtime  insulin glargine Injectable (LANTUS) 10 Unit(s) SubCutaneous every morning  insulin lispro (HumaLOG) corrective regimen sliding scale   SubCutaneous three times a day before meals  iron sucrose IVPB 100 milliGRAM(s) IV Intermittent every 48 hours  levothyroxine 50 MICROGram(s) Oral daily  methylPREDNISolone sodium succinate Injectable 40 milliGRAM(s) IV Push every 12 hours  sevelamer hydrochloride 800 milliGRAM(s) Oral three times a day with meals    MEDICATIONS  (PRN):  guaiFENesin    Syrup 100 milliGRAM(s) Oral every 6 hours PRN Cough                            8.2    18.0  )-----------( 189      ( 07 Mar 2018 06:42 )             26.1     03-07    145  |  109<H>  |  116<H>  ----------------------------<  142<H>  3.5   |  23  |  3.64<H>    Ca    8.5      07 Mar 2018 06:42  Phos  6.4     03-07  Mg     3.0     03-07    Lab pending      Osmolality, Random Urine: 374 mos/kg (03-05 @ 21:44)  Sodium, Random Urine: 22 mmol/L (03-05 @ 21:44)  Potassium, Random Urine: 39 mmol/L (03-05 @ 21:44)  Creatinine, Random Urine: 99 mg/dL (03-05 @ 21:44)      REVIEW OF SYSTEMS:  General: no fever no chills, no weight loss.  EYES/ENT: No visual changes;  No vertigo, no headache.  NECK: No pain or stiffness  RESPIRATORY: dyspnea under exertion  CARDIOVASCULAR: No chest pain or palpitations. No Edema  GASTROINTESTINAL: No abdominal or epigastric pain. No nausea, vomiting. No diarrhea or constipation. No melena.  GENITOURINARY: No dysuria, frequency, foamy urine, urinary urgency, incontinence or hematuria  NEUROLOGICAL: No numbness or weakness, no tremor , no dizziness.   Muscle skeletal : no joint pain and no swelling of joints and limbs.  SKIN: No itching, burning, rashes.        VITALS:  T(F): 97.4 (03-08-18 @ 07:09), Max: 98.9 (03-07-18 @ 12:08)  HR: 70 (03-08-18 @ 07:09)  BP: 138/56 (03-08-18 @ 07:09)  RR: 18 (03-08-18 @ 07:09)  SpO2: 100% (03-08-18 @ 07:09)  Wt(kg): --    03-07 @ 07:01  -  03-08 @ 07:00  --------------------------------------------------------  IN: 150 mL / OUT: 0 mL / NET: 150 mL        PHYSICAL EXAM:  Constitutional: well developed, no diaphoresis, no distress. Prologed expiratory phase  Neck: No JVD, no carotid bruit, supple, no adenopathy  Respiratory: mild reduced  air entrance B/L, crackles , no wheezes, rales or rhonchi  Cardiovascular: S1, S2, RRR, no pericardial rub, no murmur  Abdomen: BS+, soft, no tenderness, no bruit  Pelvis: bladder nondistended  Extremities: No cyanosis or clubbing. No peripheral edema.

## 2018-03-08 NOTE — DISCHARGE NOTE ADULT - NS AS DC HF EDUCATION INSTRUCTIONS
Monitor Weight Daily/Report weight gain of 2 or more pounds in one day or 3 or more pounds in one week, worsening shortness of breath, fatigue, weakness, increased swelling of hands and feet to primary care provider/Activities as tolerated/Call Primary Care Provider for follow-up after discharge/Low salt diet

## 2018-03-08 NOTE — DIETITIAN INITIAL EVALUATION ADULT. - PERTINENT LABORATORY DATA
reviewed    Finger stick yaogo=372 to 234 x 3d BUN/Yv=135/3.6 eGFR=9  K=6.1-->3.5   PO4=6.8-->6.4  DauG2K=3

## 2018-03-08 NOTE — PROGRESS NOTE ADULT - ASSESSMENT
89 y/o F from home with HHA  walks with walker w/ PMHx of CHF unknown EF, CKD ( as per daughter one of her kidneys is not very functional), asthma, anemia,  hypothyroid, and HTN BIB EMS for SOB,  productive cough , fever and afib.  1.Fe def anemia-IV Iron  2.Abx and steroids.  3.Pulmonary HTN-norvasc 2.5mg qd, home O2 eval.  4.Afib-asa.  5.Hypothyroidism-synthroid.  6.GI and DVT prophylaxis.

## 2018-03-08 NOTE — PROGRESS NOTE ADULT - SUBJECTIVE AND OBJECTIVE BOX
Patient is a 89y old  Female who presents with a chief complaint of sob. Awake, alert, comfortable in bed in NAD. Complaints of sob while in oxygen via NC. Does not appear in distress.    INTERVAL HPI/OVERNIGHT EVENTS:      VITAL SIGNS:  T(F): 99.3 (03-08-18 @ 11:08)  HR: 72 (03-08-18 @ 11:08)  BP: 143/48 (03-08-18 @ 11:08)  RR: 20 (03-08-18 @ 11:08)  SpO2: 100% (03-08-18 @ 11:08)  Wt(kg): --  I&O's Detail    07 Mar 2018 07:01  -  08 Mar 2018 07:00  --------------------------------------------------------  IN:    Oral Fluid: 150 mL  Total IN: 150 mL    OUT:  Total OUT: 0 mL    Total NET: 150 mL              REVIEW OF SYSTEMS:    CONSTITUTIONAL:  No fevers, chills, sweats    HEENT:  Eyes:  No diplopia or blurred vision. ENT:  No earache, sore throat or runny nose.    CARDIOVASCULAR:  No pressure, squeezing, tightness, or heaviness about the chest; no palpitations.    RESPIRATORY:  Per HPI    GASTROINTESTINAL:  No abdominal pain, nausea, vomiting or diarrhea.    GENITOURINARY:  No dysuria, frequency or urgency.    NEUROLOGIC:  No paresthesias, fasciculations, seizures or weakness.    PSYCHIATRIC:  No disorder of thought or mood.      PHYSICAL EXAM:    Constitutional: Well developed and nourished  Eyes:Perrla  ENMT: normal  Neck:supple  Respiratory: good air entry  Cardiovascular: S1 S2 regular  Gastrointestinal: Soft, Non tender  Extremities: No edema  Vascular:normal  Neurological:Awake, alert,Ox3  Musculoskeletal:Normal      MEDICATIONS  (STANDING):  ALBUTerol    0.083% 2.5 milliGRAM(s) Nebulizer every 15 minutes  ALBUTerol/ipratropium for Nebulization 3 milliLiter(s) Nebulizer every 6 hours  amLODIPine   Tablet 2.5 milliGRAM(s) Oral daily  aspirin enteric coated 81 milliGRAM(s) Oral daily  atorvastatin 20 milliGRAM(s) Oral at bedtime  azithromycin   Tablet 500 milliGRAM(s) Oral daily  cefTRIAXone   IVPB 1 Gram(s) IV Intermittent every 24 hours  epoetin anju Injectable 3000 Unit(s) SubCutaneous <User Schedule>  folic acid 1 milliGRAM(s) Oral daily  heparin  Injectable 5000 Unit(s) SubCutaneous every 12 hours  insulin glargine Injectable (LANTUS) 5 Unit(s) SubCutaneous at bedtime  insulin glargine Injectable (LANTUS) 10 Unit(s) SubCutaneous every morning  insulin lispro (HumaLOG) corrective regimen sliding scale   SubCutaneous three times a day before meals  iron sucrose IVPB 100 milliGRAM(s) IV Intermittent every 48 hours  levothyroxine 50 MICROGram(s) Oral daily  methylPREDNISolone sodium succinate Injectable 40 milliGRAM(s) IV Push every 12 hours  sevelamer hydrochloride 800 milliGRAM(s) Oral three times a day with meals    MEDICATIONS  (PRN):  guaiFENesin    Syrup 100 milliGRAM(s) Oral every 6 hours PRN Cough      Allergies    No Known Allergies    Intolerances        LABS:                        8.2    18.0  )-----------( 189      ( 07 Mar 2018 06:42 )             26.1     03-07    145  |  109<H>  |  116<H>  ----------------------------<  142<H>  3.5   |  23  |  3.64<H>    Ca    8.5      07 Mar 2018 06:42  Phos  6.4     03-07  Mg     3.0     03-07                CAPILLARY BLOOD GLUCOSE      POCT Blood Glucose.: 197 mg/dL (08 Mar 2018 11:29)  POCT Blood Glucose.: 130 mg/dL (08 Mar 2018 07:48)  POCT Blood Glucose.: 181 mg/dL (07 Mar 2018 21:05)  POCT Blood Glucose.: 173 mg/dL (07 Mar 2018 16:05)    pro-bnp [id number] 03-04 @ 12:05     d-dimer --  03-04 @ 12:05      RADIOLOGY & ADDITIONAL TESTS:    CXR:    Ct scan chest:  < from: CT Chest No Cont (03.07.18 @ 14:24) >  IMPRESSION:   Small right pleural effusion with consolidation in the right upper lobe,   probably pneumonia.    Scattered patchy opacities in the right lung, possibly endobronchial   spread of infection.    < end of copied text >    ekg;    echo:

## 2018-03-08 NOTE — PROGRESS NOTE ADULT - PROBLEM SELECTOR PLAN 1
Patient presented with leukocytosis, fevers, productive cough and consolidation on CXR suggestive of Pneumonia  RVP non detected  procalcitonin is 1.02  c/w rocephin and azithromycin  - Urine legionella antibody positive but urine legionella negative  -Blood culture normal  ID Dr. Chen

## 2018-03-08 NOTE — ADVANCED PRACTICE NURSE CONSULT - RECOMMEDATIONS
-Clean all affected areas with warm water, mild soap, pat dry, and apply skin prep to the surrounding skin  -Apply Antifungal Powder to the Perineal areas b.i.d. PRN  -Apply Zinc Oxide Moisture Barrier Cream to the Bilateral Gluteal and Perianal areas b.i.d. PRN  -Elevate/float the patients heels using heel protectors and reposition the patient Q 2hrs using wedges or pillows

## 2018-03-08 NOTE — DIETITIAN INITIAL EVALUATION ADULT. - DIET TYPE
no concentrated phosphorus/no concentrated potassium/mechanical soft/DASH/TLC (sodium and cholesterol restricted diet)

## 2018-03-08 NOTE — CHART NOTE - NSCHARTNOTEFT_GEN_A_CORE
Paged because pt was having difficulty breathing overnight.  On exam pt using accessory muscles and grunting.  Bilat exp wheezing to auscultation.  Saturating 98% on 2L.  Administered stat dose of solumedrol, and albuterol treatment.  Switched standing solumedrol to q 8.  Pt currently stable.  Please reassess in AM.     Vital Signs Last 24 Hrs  T(C): 36.2 (08 Mar 2018 20:18), Max: 37.4 (08 Mar 2018 11:08)  T(F): 97.2 (08 Mar 2018 20:18), Max: 99.3 (08 Mar 2018 11:08)  HR: 81 (08 Mar 2018 20:18) (70 - 81)  BP: 151/53 (08 Mar 2018 20:18) (136/57 - 156/46)  RR: 18 (08 Mar 2018 20:18) (18 - 22)  SpO2: 96% (08 Mar 2018 20:18) (96% - 100%)

## 2018-03-08 NOTE — PROGRESS NOTE ADULT - SUBJECTIVE AND OBJECTIVE BOX
CHIEF COMPLAINT:Patient is a 89y old  Female who presents with a chief complaint of sob. Pt appears comfortable.  	  REVIEW OF SYSTEMS:  [x ] Unable to obtain    PHYSICAL EXAM:  T(C): 36.3 (03-08-18 @ 07:09), Max: 37.2 (03-07-18 @ 12:08)  HR: 70 (03-08-18 @ 07:09) (70 - 76)  BP: 138/56 (03-08-18 @ 07:09) (138/56 - 156/46)  RR: 18 (03-08-18 @ 07:09) (16 - 22)  SpO2: 100% (03-08-18 @ 07:09) (96% - 100%)  Wt(kg): --  I&O's Summary    07 Mar 2018 07:01  -  08 Mar 2018 07:00  --------------------------------------------------------  IN: 150 mL / OUT: 0 mL / NET: 150 mL        Appearance: Normal	  HEENT:   Normal oral mucosa, PERRL, EOMI	  Lymphatic: No lymphadenopathy  Cardiovascular: Normal S1 S2, No JVD, No murmurs, No edema  Respiratory: Lungs clear to auscultation	  Gastrointestinal:  Soft, Non-tender, + BS	  Skin: No rashes, No ecchymoses, No cyanosis	  Extremities: Normal range of motion, No clubbing, cyanosis or edema  Vascular: Peripheral pulses palpable 2+ bilaterally    MEDICATIONS  (STANDING):  ALBUTerol    0.083% 2.5 milliGRAM(s) Nebulizer every 15 minutes  ALBUTerol/ipratropium for Nebulization 3 milliLiter(s) Nebulizer every 6 hours  aspirin enteric coated 81 milliGRAM(s) Oral daily  atorvastatin 20 milliGRAM(s) Oral at bedtime  azithromycin   Tablet 500 milliGRAM(s) Oral daily  cefTRIAXone   IVPB 1 Gram(s) IV Intermittent every 24 hours  epoetin anju Injectable 3000 Unit(s) SubCutaneous <User Schedule>  folic acid 1 milliGRAM(s) Oral daily  heparin  Injectable 5000 Unit(s) SubCutaneous every 12 hours  insulin glargine Injectable (LANTUS) 5 Unit(s) SubCutaneous at bedtime  insulin glargine Injectable (LANTUS) 10 Unit(s) SubCutaneous every morning  insulin lispro (HumaLOG) corrective regimen sliding scale   SubCutaneous three times a day before meals  iron sucrose IVPB 100 milliGRAM(s) IV Intermittent every 48 hours  levothyroxine 50 MICROGram(s) Oral daily  methylPREDNISolone sodium succinate Injectable 40 milliGRAM(s) IV Push every 12 hours  sevelamer hydrochloride 800 milliGRAM(s) Oral three times a day with meals      LABS:	 	                      8.2    18.0  )-----------( 189      ( 07 Mar 2018 06:42 )             26.1     03-07    145  |  109<H>  |  116<H>  ----------------------------<  142<H>  3.5   |  23  |  3.64<H>    Ca    8.5      07 Mar 2018 06:42  Phos  6.4     03-07  Mg     3.0     03-07      proBNP: Serum Pro-Brain Natriuretic Peptide: 8534 pg/mL (03-04 @ 12:05)    Lipid Profile: Cholesterol 67  LDL 27  HDL 23  TG 84    HgA1c: Hemoglobin A1C, Whole Blood: 6.0 % (03-04 @ 21:20)    TSH: Thyroid Stimulating Hormone, Serum: 2.12 uU/mL (03-04 @ 18:20)      OBSERVATIONS:  Mitral Valve: Normal mitral valve. Mild mitral  regurgitation.  Aortic Root: Aortic Root: 3.0 cm.  Aortic Valve: Calcified trileaflet aortic valve with normal  opening.  Left Atrium: Moderate left atrial enlargement.  Left Ventricle: Normal Left Ventricular Systolic Function,  (EF = 55 to 60%) Normal left ventricular internal  dimensions and wall thicknesses. Grade II diastolic  dysfunction.  Right Heart: Moderate right atrial enlargement. Normal  right ventricular size and function. There is moderate  tricuspid regurgitation. There is mild pulmonic  regurgitation.  Pericardium/PleuraNormal pericardium with no pericardial  effusion.  Hemodynamic: RA Pressure is 10 mm Hg. RV systolic pressure  is 60 mm Hg. Moderate-severe pulmonary hypertension.

## 2018-03-08 NOTE — PROGRESS NOTE ADULT - SUBJECTIVE AND OBJECTIVE BOX
Patient is a 89y old  Female who presents with a chief complaint of SOB,  productive cough and fever.      pt seen in tele [ x ], reg med floor [   ], bed [ x ], chair at bedside [   ], a+o x3 [ x ], lethargic [  ],  nad [ x ]        Allergies    No Known Allergies        Vitals    T(F): 97.4 (03-08-18 @ 07:09), Max: 98.9 (03-07-18 @ 12:08)  HR: 70 (03-08-18 @ 07:09) (70 - 76)  BP: 138/56 (03-08-18 @ 07:09) (138/56 - 156/46)  RR: 18 (03-08-18 @ 07:09) (16 - 22)  SpO2: 100% (03-08-18 @ 07:09) (96% - 100%)  Wt(kg): --  CAPILLARY BLOOD GLUCOSE      POCT Blood Glucose.: 130 mg/dL (08 Mar 2018 07:48)      Labs                          8.2    18.0  )-----------( 189      ( 07 Mar 2018 06:42 )             26.1       03-07    145  |  109<H>  |  116<H>  ----------------------------<  142<H>  3.5   |  23  |  3.64<H>    Ca    8.5      07 Mar 2018 06:42  Phos  6.4     03-07  Mg     3.0     03-07        Legionella Antigen, Urine: Negative (03.07.18 @ 17:36)    Legionella Antibody: Positive: Test Performed by:  [facility]  [address] [place] [address] (03.05.18 @ 11:40)      .Blood Blood  03-04 @ 21:44   No growth to date.  --  --      < from: Transthoracic Echocardiogram (03.06.18 @ 07:20) >  CONCLUSIONS:  1. Normal mitral valve. Mild mitral regurgitation.  2. Calcified trileaflet aortic valve with normal opening.  3. Aortic Root: 3.0 cm.  4. Moderate left atrial enlargement.  5. Normal left ventricular internal dimensions and wall  thicknesses.  6. Normal Left Ventricular Systolic Function,  (EF = 55 to  60%)  7. Grade II diastolic dysfunction.  8. Moderate right atrial enlargement.  9. Normal right ventricular size and function.  10. RA Pressure is 10 mm Hg.  11. RV systolic pressure is 60 mm Hg. Moderate-severe  pulmonary hypertension.  12. There is moderate tricuspid regurgitation.  13. There is mild pulmonic regurgitation.  14. Normal pericardium with no pericardial effusion.    < end of copied text >          Radiology Results      < from: CT Chest No Cont (03.07.18 @ 14:24) >  FINDINGS:    LUNGS AND LARGE AIRWAYS: Consolidation in the right upper lobe. Scattered   patchy opacities in the remainder of the right lung. Passive atelectasis   in the right lower lobe.  PLEURA: Small right pleural effusion.  VESSELS: Atherosclerotic calcifications.   HEART: Cardiomegaly. No pericardial effusion.  MEDIASTINUM AND EARLENE: No lymphadenopathy.  CHEST WALL AND LOWER NECK: Within normal limits.  VISUALIZED UPPER ABDOMEN: Within normal limits.  BONES: Degenerative changes of the spine.    IMPRESSION:   Small right pleural effusion with consolidation in the right upper lobe,   probably pneumonia.    Scattered patchy opacities in the right lung, possibly endobronchial   spread of infection.    < end of copied text >            Meds    MEDICATIONS  (STANDING):  ALBUTerol    0.083% 2.5 milliGRAM(s) Nebulizer every 15 minutes  ALBUTerol/ipratropium for Nebulization 3 milliLiter(s) Nebulizer every 6 hours  aspirin enteric coated 81 milliGRAM(s) Oral daily  atorvastatin 20 milliGRAM(s) Oral at bedtime  azithromycin   Tablet 500 milliGRAM(s) Oral daily  cefTRIAXone   IVPB 1 Gram(s) IV Intermittent every 24 hours  epoetin anju Injectable 3000 Unit(s) SubCutaneous <User Schedule>  folic acid 1 milliGRAM(s) Oral daily  heparin  Injectable 5000 Unit(s) SubCutaneous every 12 hours  insulin glargine Injectable (LANTUS) 5 Unit(s) SubCutaneous at bedtime  insulin glargine Injectable (LANTUS) 10 Unit(s) SubCutaneous every morning  insulin lispro (HumaLOG) corrective regimen sliding scale   SubCutaneous three times a day before meals  iron sucrose IVPB 100 milliGRAM(s) IV Intermittent every 48 hours  levothyroxine 50 MICROGram(s) Oral daily  methylPREDNISolone sodium succinate Injectable 40 milliGRAM(s) IV Push every 12 hours  sevelamer hydrochloride 800 milliGRAM(s) Oral three times a day with meals      MEDICATIONS  (PRN):  guaiFENesin    Syrup 100 milliGRAM(s) Oral every 6 hours PRN Cough      Physical Exam      Neuro :  no focal deficits  Respiratory: B/L wheeze  CV: RRR, S1S2, no murmurs,   Abdominal: Soft, NT, ND +BS,  Extremities: No edema, + peripheral pulses    ASSESSMENT    Pneumonia  anemia  acute on ckd  electrolyte imbalance  afib  h/o HTN (hypertension)  asthma  CHF (congestive heart failure)  hypothyroid  No significant past surgical history      PLAN    cont tele  echo with EF = 55 to 60%, Grade II diastolic dysfunction. Moderate-severe pulmonary hypertension.  cardio f/u  cont aspirin  cont rocephin and zithromax  cont atrov and prov nebs,   cont supplemental oxygen,   cont steroids  ct chest with rul pna noted above  urine legionella neg noted above  serum legionella antibody positive noted above  bld cx neg noted above  f/u ucx,   id f/u   pulm f/u  renal f/u  f/u renal sono  cont venofer  s/p 1unit prbc  h/h stable  check stool occult blood  cont current meds Patient is a 89y old  Female who presents with a chief complaint of SOB,  productive cough and fever.      pt seen in tele [ x ], reg med floor [   ], bed [ x ], chair at bedside [   ], a+o x3 [ x ], lethargic [  ],  nad [ x ]        Allergies    No Known Allergies        Vitals    T(F): 97.4 (03-08-18 @ 07:09), Max: 98.9 (03-07-18 @ 12:08)  HR: 70 (03-08-18 @ 07:09) (70 - 76)  BP: 138/56 (03-08-18 @ 07:09) (138/56 - 156/46)  RR: 18 (03-08-18 @ 07:09) (16 - 22)  SpO2: 100% (03-08-18 @ 07:09) (96% - 100%)  Wt(kg): --  CAPILLARY BLOOD GLUCOSE      POCT Blood Glucose.: 130 mg/dL (08 Mar 2018 07:48)      Labs                          8.2    18.0  )-----------( 189      ( 07 Mar 2018 06:42 )             26.1       03-07    145  |  109<H>  |  116<H>  ----------------------------<  142<H>  3.5   |  23  |  3.64<H>    Ca    8.5      07 Mar 2018 06:42  Phos  6.4     03-07  Mg     3.0     03-07        Legionella Antigen, Urine: Negative (03.07.18 @ 17:36)    Legionella Antibody: Positive: Test Performed by:  [facility]  [address] [place] [address] (03.05.18 @ 11:40)      .Blood Blood  03-04 @ 21:44   No growth to date.  --  --      < from: Transthoracic Echocardiogram (03.06.18 @ 07:20) >  CONCLUSIONS:  1. Normal mitral valve. Mild mitral regurgitation.  2. Calcified trileaflet aortic valve with normal opening.  3. Aortic Root: 3.0 cm.  4. Moderate left atrial enlargement.  5. Normal left ventricular internal dimensions and wall  thicknesses.  6. Normal Left Ventricular Systolic Function,  (EF = 55 to  60%)  7. Grade II diastolic dysfunction.  8. Moderate right atrial enlargement.  9. Normal right ventricular size and function.  10. RA Pressure is 10 mm Hg.  11. RV systolic pressure is 60 mm Hg. Moderate-severe  pulmonary hypertension.  12. There is moderate tricuspid regurgitation.  13. There is mild pulmonic regurgitation.  14. Normal pericardium with no pericardial effusion.    < end of copied text >          Radiology Results      < from: CT Chest No Cont (03.07.18 @ 14:24) >  FINDINGS:    LUNGS AND LARGE AIRWAYS: Consolidation in the right upper lobe. Scattered   patchy opacities in the remainder of the right lung. Passive atelectasis   in the right lower lobe.  PLEURA: Small right pleural effusion.  VESSELS: Atherosclerotic calcifications.   HEART: Cardiomegaly. No pericardial effusion.  MEDIASTINUM AND EARLENE: No lymphadenopathy.  CHEST WALL AND LOWER NECK: Within normal limits.  VISUALIZED UPPER ABDOMEN: Within normal limits.  BONES: Degenerative changes of the spine.    IMPRESSION:   Small right pleural effusion with consolidation in the right upper lobe,   probably pneumonia.    Scattered patchy opacities in the right lung, possibly endobronchial   spread of infection.    < end of copied text >            Meds    MEDICATIONS  (STANDING):  ALBUTerol    0.083% 2.5 milliGRAM(s) Nebulizer every 15 minutes  ALBUTerol/ipratropium for Nebulization 3 milliLiter(s) Nebulizer every 6 hours  aspirin enteric coated 81 milliGRAM(s) Oral daily  atorvastatin 20 milliGRAM(s) Oral at bedtime  azithromycin   Tablet 500 milliGRAM(s) Oral daily  cefTRIAXone   IVPB 1 Gram(s) IV Intermittent every 24 hours  epoetin anju Injectable 3000 Unit(s) SubCutaneous <User Schedule>  folic acid 1 milliGRAM(s) Oral daily  heparin  Injectable 5000 Unit(s) SubCutaneous every 12 hours  insulin glargine Injectable (LANTUS) 5 Unit(s) SubCutaneous at bedtime  insulin glargine Injectable (LANTUS) 10 Unit(s) SubCutaneous every morning  insulin lispro (HumaLOG) corrective regimen sliding scale   SubCutaneous three times a day before meals  iron sucrose IVPB 100 milliGRAM(s) IV Intermittent every 48 hours  levothyroxine 50 MICROGram(s) Oral daily  methylPREDNISolone sodium succinate Injectable 40 milliGRAM(s) IV Push every 12 hours  sevelamer hydrochloride 800 milliGRAM(s) Oral three times a day with meals      MEDICATIONS  (PRN):  guaiFENesin    Syrup 100 milliGRAM(s) Oral every 6 hours PRN Cough      Physical Exam      Neuro :  no focal deficits  Respiratory: B/L wheeze  CV: RRR, S1S2, no murmurs,   Abdominal: Soft, NT, ND +BS,  Extremities: No edema, + peripheral pulses    ASSESSMENT    Pneumonia  anemia  acute on ckd  electrolyte imbalance  afib  h/o HTN (hypertension)  asthma  CHFpEF (congestive heart failure)  hypothyroid  No significant past surgical history      PLAN    cont tele  echo with EF = 55 to 60%, Grade II diastolic dysfunction. Moderate-severe pulmonary hypertension.  cardio f/u  cont aspirin  cont rocephin and zithromax  cont atrov and prov nebs,   cont supplemental oxygen,   cont steroids  ct chest with rul pna noted above  urine legionella neg noted above  serum legionella antibody positive noted above  bld cx neg noted above  f/u ucx,   id f/u   pulm f/u  renal f/u  f/u renal sono  cont venofer  s/p 1unit prbc  h/h stable  check stool occult blood  cont current meds Patient is a 89y old  Female who presents with a chief complaint of SOB,  productive cough and fever.      pt seen in tele [ x ], reg med floor [   ], bed [  ], chair at bedside [  x ], a+o x3 [ x ], lethargic [  ],  nad [ x ]        Allergies    No Known Allergies        Vitals    T(F): 97.4 (03-08-18 @ 07:09), Max: 98.9 (03-07-18 @ 12:08)  HR: 70 (03-08-18 @ 07:09) (70 - 76)  BP: 138/56 (03-08-18 @ 07:09) (138/56 - 156/46)  RR: 18 (03-08-18 @ 07:09) (16 - 22)  SpO2: 100% (03-08-18 @ 07:09) (96% - 100%)  Wt(kg): --  CAPILLARY BLOOD GLUCOSE      POCT Blood Glucose.: 130 mg/dL (08 Mar 2018 07:48)      Labs                          8.2    18.0  )-----------( 189      ( 07 Mar 2018 06:42 )             26.1       03-07    145  |  109<H>  |  116<H>  ----------------------------<  142<H>  3.5   |  23  |  3.64<H>    Ca    8.5      07 Mar 2018 06:42  Phos  6.4     03-07  Mg     3.0     03-07        Legionella Antigen, Urine: Negative (03.07.18 @ 17:36)    Legionella Antibody: Positive: Test Performed by:  [facility]  [address] [place] [address] (03.05.18 @ 11:40)      .Blood Blood  03-04 @ 21:44   No growth to date.  --  --      < from: Transthoracic Echocardiogram (03.06.18 @ 07:20) >  CONCLUSIONS:  1. Normal mitral valve. Mild mitral regurgitation.  2. Calcified trileaflet aortic valve with normal opening.  3. Aortic Root: 3.0 cm.  4. Moderate left atrial enlargement.  5. Normal left ventricular internal dimensions and wall  thicknesses.  6. Normal Left Ventricular Systolic Function,  (EF = 55 to  60%)  7. Grade II diastolic dysfunction.  8. Moderate right atrial enlargement.  9. Normal right ventricular size and function.  10. RA Pressure is 10 mm Hg.  11. RV systolic pressure is 60 mm Hg. Moderate-severe  pulmonary hypertension.  12. There is moderate tricuspid regurgitation.  13. There is mild pulmonic regurgitation.  14. Normal pericardium with no pericardial effusion.    < end of copied text >          Radiology Results      < from: CT Chest No Cont (03.07.18 @ 14:24) >  FINDINGS:    LUNGS AND LARGE AIRWAYS: Consolidation in the right upper lobe. Scattered   patchy opacities in the remainder of the right lung. Passive atelectasis   in the right lower lobe.  PLEURA: Small right pleural effusion.  VESSELS: Atherosclerotic calcifications.   HEART: Cardiomegaly. No pericardial effusion.  MEDIASTINUM AND EARLENE: No lymphadenopathy.  CHEST WALL AND LOWER NECK: Within normal limits.  VISUALIZED UPPER ABDOMEN: Within normal limits.  BONES: Degenerative changes of the spine.    IMPRESSION:   Small right pleural effusion with consolidation in the right upper lobe,   probably pneumonia.    Scattered patchy opacities in the right lung, possibly endobronchial   spread of infection.    < end of copied text >            Meds    MEDICATIONS  (STANDING):  ALBUTerol    0.083% 2.5 milliGRAM(s) Nebulizer every 15 minutes  ALBUTerol/ipratropium for Nebulization 3 milliLiter(s) Nebulizer every 6 hours  aspirin enteric coated 81 milliGRAM(s) Oral daily  atorvastatin 20 milliGRAM(s) Oral at bedtime  azithromycin   Tablet 500 milliGRAM(s) Oral daily  cefTRIAXone   IVPB 1 Gram(s) IV Intermittent every 24 hours  epoetin anju Injectable 3000 Unit(s) SubCutaneous <User Schedule>  folic acid 1 milliGRAM(s) Oral daily  heparin  Injectable 5000 Unit(s) SubCutaneous every 12 hours  insulin glargine Injectable (LANTUS) 5 Unit(s) SubCutaneous at bedtime  insulin glargine Injectable (LANTUS) 10 Unit(s) SubCutaneous every morning  insulin lispro (HumaLOG) corrective regimen sliding scale   SubCutaneous three times a day before meals  iron sucrose IVPB 100 milliGRAM(s) IV Intermittent every 48 hours  levothyroxine 50 MICROGram(s) Oral daily  methylPREDNISolone sodium succinate Injectable 40 milliGRAM(s) IV Push every 12 hours  sevelamer hydrochloride 800 milliGRAM(s) Oral three times a day with meals      MEDICATIONS  (PRN):  guaiFENesin    Syrup 100 milliGRAM(s) Oral every 6 hours PRN Cough      Physical Exam      Neuro :  no focal deficits  Respiratory: basal crackles  CV: RRR, S1S2, no murmurs,   Abdominal: Soft, NT, ND +BS,  Extremities: No edema, + peripheral pulses    ASSESSMENT    Pneumonia  anemia  acute on ckd  electrolyte imbalance  afib  h/o HTN (hypertension)  asthma  CHFpEF (congestive heart failure)  hypothyroid  No significant past surgical history      PLAN    cont tele  echo with EF = 55 to 60%, Grade II diastolic dysfunction. Moderate-severe pulmonary hypertension.  cardio f/u  cont aspirin  cont rocephin and zithromax  cont atrov and prov nebs,   cont supplemental oxygen,   cont steroids  ct chest with rul pna noted above  urine legionella neg noted above  serum legionella antibody positive noted above  bld cx neg noted above  f/u ucx,   id f/u   pulm f/u  renal f/u  f/u renal sono  cont venofer  s/p 1unit prbc  h/h stable  check stool occult blood  cont current meds

## 2018-03-08 NOTE — PROGRESS NOTE ADULT - ASSESSMENT
89 years old with CKD stage 5  Hyperkalemia on admission resolved to follow 2 gm K diet and see k in am.   Today K improved still less than 4.0 .  Increased BUN at present due to steroids patient on Methylprednisolone and possible mild hypovolemia.  Increased in creatinine may reflects mild hypovolemia.  Suggest to hold diuretics and attempt to increase her fluids po intake.  Liberalize K diet.    Await repeat lab.    Anemia low iron saturation.  Start Venofer and Epogen.    Pulmonary hypertension.    Renal bone disease with increased PO4 on Renagel .

## 2018-03-08 NOTE — ADVANCED PRACTICE NURSE CONSULT - ASSESSMENT
This is a 89yr old female patient admitted for Pneumonia, presenting with Incontinence Associated Dermatitis to the Perianal and Perineal areas. There is also evidence of hypopigmentation to the Bilateral Gluteal areas.

## 2018-03-08 NOTE — PROGRESS NOTE ADULT - SUBJECTIVE AND OBJECTIVE BOX
Meds:  cefTRIAXone   IVPB 1 Gram(s) IV Intermittent every 24 hours    Allergies:  Allergies    No Known Allergies    Intolerances      ROS  [  ] UNABLE TO ELICIT    General:  [  ] None  [ x ] Fever  [  ] Chills  [  ] Malaise    Skin:  [ x ] None [  ] Rash  [  ] Wound  [  ] Ulcer    HEENT:  [ x ] None  [  ] Sore Throat  [  ] Nasal congestion/ runny nose  [  ] Photophobia [  ] Neck pain      Chest:  [  ] None   [ x ] SOB  [ x ] Cough  [  ] None    Cardiovascular:   [ x ] None  [  ] CP  [  ] Palpitation    Gastrointestinal:  [ x ] None  [  ] Abd pain   [  ] Nausea    [  ] Vomiting   [  ] Diarrhea	     Genitourinary:  [ x ] None [  ] Polyuria   [  ] Urgency  [  ] Frequency  [  ] Dysuria    [  ]  Hematuria       Musculoskeletal:  [  ] None [  ] Back Pain	[  ] Body aches  [  ] Joint pain [ x ] legs edema    Neurological: [  ] None [  ]Dizziness  [  ]Visual Disturbance  [  ]Headaches   [ x ] Weakness          PHYSICAL EXAM:  Vital Signs Last 24 Hrs  T(C): 36.2 (08 Mar 2018 15:50), Max: 37.4 (08 Mar 2018 11:08)  T(F): 97.2 (08 Mar 2018 15:50), Max: 99.3 (08 Mar 2018 11:08)  HR: 75 (08 Mar 2018 15:50) (70 - 76)  BP: 136/57 (08 Mar 2018 15:50) (136/57 - 156/46)  BP(mean): --  RR: 18 (08 Mar 2018 15:50) (17 - 22)  SpO2: 96% (08 Mar 2018 15:50) (96% - 100%)    Constitutional:    HEENT: [ x ] Wnl  [  ] Pharyngeal congestion    Neck:  [  ] Supple  [  ]Lymphadenopathy  [  ] No JVD   [  ] JVD  [  ] Masses   [  ] WNL    CHEST/Respiratory:  [  ]Clear to auscultation  [ x ] Rales   [  ] Rhonchi   [ x ] Wheezing     [  ] Chest Tenderness      Cardiovascular:  [ x ] Reg S1 S2   [  ] Irreg S1 S2   [ x ]No Murmur  [  ] +ve Murmurs  [  ]Systolic [  ]Diastolic      Abdomen:  [ x ] Soft  [  ] No tendrerness  [  ] Tenderness  [  ] Organomegaly  [  ] ABD Distention  [  ] Rigidity                       [ x ] No Rigidity                       [ x ] No Rebound Tenderness  [  ] No Guarding Rigidity  [  ] Rebound Tenderness[  ] Guarding Rigidity                          [ x ]  +ve Bowel Sounds  [  ] Decreased Bowel Sounds    [  ] Absent Bowel Sounds                            Extremities: [  ] No edema [ x ] Edema  [  ] Clubbing   [  ] Cyanosis                         [ x ] No Tender Calf muscles  [  ] Tender Calf muscles                        [ x ] Palpable peripheral pulses    Neurological: [ x ] Awake  [ x ] Alert  [  ] Oriented  x                             [  ] Confused  [  ] Drowsy  [  ] respond to painful stimuli  [  ] Unresponsive    Skin:  [ x ] Intact [  ] Redness [  ] Thrombophlebitis  [  ] Rashes  [  ] Dry  [  ] Ulcers    Ortho:  [  ] Joint Swelling  [  ] Joint erythema [  ] Joint tenderness                [  ] Increased temp. to touch  [  ] DJD [ x ] WNL          LABS/DIAGNOSTIC TESTS                        8.2    18.0  )-----------( 189      ( 07 Mar 2018 06:42 )             26.1   03-07    145  |  109<H>  |  116<H>  ----------------------------<  142<H>  3.5   |  23  |  3.64<H>    Ca    8.5      07 Mar 2018 06:42  Phos  6.4     03-07  Mg     3.0     03-07    CAPILLARY BLOOD GLUCOSE      POCT Blood Glucose.: 220 mg/dL (08 Mar 2018 16:22)  POCT Blood Glucose.: 197 mg/dL (08 Mar 2018 11:29)  POCT Blood Glucose.: 130 mg/dL (08 Mar 2018 07:48)  POCT Blood Glucose.: 181 mg/dL (07 Mar 2018 21:05)      Procalcitonin, Serum: 0.94: Procalcitonin (PCT) Interpretation (ng/mL) - Diagnosis of systemic  bacterial infection/sepsis  PCT < 0.5: Systemic infection (sepsis) is not likely and risk for  progression to severe systemic infection is low. Local bacterial  infection is possible. If early sepsis is suspected clinically, PCT  should be re-assessed in 6-24 hours.  PCT >/= 0.5 but < 2.0: Systemic infection (sepsis) is possible, but other  conditions are known to elevate PCT as well. Moderate risk for  progression to severe systemic infection. The patient should be closely  monitored both clinically and by re-assessing PCT within 6-24 hours.  PCT >/= 2.0 but < 10.0: Systemic infection (sepsis) is likely, unless  other causes are known. High risk of progression to severe systemic  infection (severe sepsis/septic shock).  PCT >/= 10.0: Important systemic inflammatory response, almost  exclusively due to severe bacterial sepsis or septic shock. High  likelihood of severe sepsis or septic shock. ng/mL (03.06.18 @ 04:30)          CULTURES:   Culture - Blood (03.04.18 @ 21:44)    Specimen Source: .Blood Blood    Culture Results:   No growth to date.    Culture - Blood (03.04.18 @ 21:44)    Specimen Source: .Blood Blood    Culture Results:   No growth to date.        RADIOLOGY:    EXAM:  CT CHEST                            PROCEDURE DATE:  03/07/2018          INTERPRETATION:  CLINICAL INFORMATION: Wheezing.    COMPARISON: None.    PROCEDURE:   CT of the Chest was performed without intravenous contrast.  Sagittal and coronal reformats were performed.    FINDINGS:    LUNGS AND LARGE AIRWAYS: Consolidation in the right upper lobe. Scattered   patchy opacities in the remainder of the right lung. Passive atelectasis   in the right lower lobe.  PLEURA: Small right pleural effusion.  VESSELS: Atherosclerotic calcifications.   HEART: Cardiomegaly. No pericardial effusion.  MEDIASTINUM AND EARLENE: No lymphadenopathy.  CHEST WALL AND LOWER NECK: Within normal limits.  VISUALIZED UPPER ABDOMEN: Within normal limits.  BONES: Degenerative changes of the spine.    IMPRESSION:   Small right pleural effusion with consolidation in the right upper lobe,   probably pneumonia.    Scattered patchy opacities in the right lung, possibly endobronchial   spread of infection.      Assessment and Recommendation:   89 y/o F from home with HHA  walks with walker w/ PMHx of CHF unknown EF, CKD ( as per daughter one of her kidneys is not very functional), asthma, anemia,  hypothyroid, and HTN BIB EMS for SOB,  productive cough and fever.  Patient was admitted for pneumonia and CHF and was started on IV Rocephin.  CT chest suggested pneumonia.    Problem/Recommendation - 1:  Problem: Pneumonia.   Recommendation:   1- Continue Rocephin.  2- procalcitonin level is improving.  3- O2 as needed.  4- Pulmonary management.  5- Repeat CBC and BMP for follow up.  6- Follow blood cultures till final reports.    Problem/Recommendation - 2:  ·  Problem: COPD exacerbation.    Recommendation:   1- Bronchodilators.  2- O2 as needed.  3- Pulmonary evaluation and management.  4- Steroids as per primary, and pulmonary team.     Problem/Recommendation - 3:  ·  Problem: Anemia.    Recommendation:   1- Closely monitor H & H.  2- Observe for bleeding.     Problem/Recommendation - 4:  ·  Problem: CHF (congestive heart failure).    Recommendation:   1- Monitor Blood pressure closely.  2- Blood pressure control.  3- BP. and cardiac meds and management as per cardiology and primary care team.     Discussed with medical resident.

## 2018-03-09 NOTE — PROGRESS NOTE ADULT - ASSESSMENT
CKD stage 5.  Hypernatremia and increased BUN due to steroids and reduced PO intake.    Suggest to start IV fluids D5W at rate of 50 ml /hour for next 12 hours.  Follow xr chest.  Monitor fluid status.     Hyperpo4 on renagel.    Hypertension stable

## 2018-03-09 NOTE — PROGRESS NOTE ADULT - PROBLEM SELECTOR PLAN 6
CKD stage 5  BUN/Cr improving  Will f/u BMP  Nephro Dr. Moore  monitor BMP  f/u urinelytes  Nephro: Dr Moore

## 2018-03-09 NOTE — PROGRESS NOTE ADULT - SUBJECTIVE AND OBJECTIVE BOX
Patient is a 89y old  Female who presents with a chief complaint of     INTERVAL HPI/OVERNIGHT EVENTS: No major overnight events. Patient seen at bedside, reports of feeling Dyspnea, weakness. Granddaughter in law at bedside, explained her the patient's condition, Patient CXR got worse, will change to broad spectrum antibiotic. Patient also had one episode of blood tinged sputum yesterday, Will do D dimer to r/o PE.      MEDICATIONS  (STANDING):  ALBUTerol    0.083% 2.5 milliGRAM(s) Nebulizer every 15 minutes  ALBUTerol/ipratropium for Nebulization 3 milliLiter(s) Nebulizer every 6 hours  amLODIPine   Tablet 2.5 milliGRAM(s) Oral daily  aspirin enteric coated 81 milliGRAM(s) Oral daily  atorvastatin 20 milliGRAM(s) Oral at bedtime  epoetin anju Injectable 3000 Unit(s) SubCutaneous <User Schedule>  folic acid 1 milliGRAM(s) Oral daily  heparin  Injectable 5000 Unit(s) SubCutaneous every 12 hours  insulin glargine Injectable (LANTUS) 5 Unit(s) SubCutaneous at bedtime  insulin glargine Injectable (LANTUS) 10 Unit(s) SubCutaneous every morning  insulin lispro (HumaLOG) corrective regimen sliding scale   SubCutaneous three times a day before meals  iron sucrose IVPB 100 milliGRAM(s) IV Intermittent every 48 hours  levothyroxine 50 MICROGram(s) Oral daily  methylPREDNISolone sodium succinate Injectable 40 milliGRAM(s) IV Push every 8 hours  nystatin Ointment 1 Application(s) Topical daily  piperacillin/tazobactam IVPB. 3.375 Gram(s) IV Intermittent every 12 hours  sevelamer hydrochloride 800 milliGRAM(s) Oral three times a day with meals    MEDICATIONS  (PRN):  guaiFENesin    Syrup 100 milliGRAM(s) Oral every 6 hours PRN Cough      Allergies    No Known Allergies    Intolerances        REVIEW OF SYSTEMS:  CONSTITUTIONAL: No fever, weight loss, or fatigue  RESPIRATORY:  SOB, cough, wheezing, No chills or hemoptysis; No shortness of breath  CARDIOVASCULAR: No chest pain, palpitations, dizziness, or leg swelling  GASTROINTESTINAL: No abdominal or epigastric pain. No nausea, vomiting, or hematemesis; No diarrhea or constipation. No melena or hematochezia.  NEUROLOGICAL: No headaches, memory loss, loss of strength, numbness, or tremors  SKIN: No itching, burning, rashes, or lesions     Vital Signs Last 24 Hrs  T(C): 36.2 (09 Mar 2018 07:49), Max: 37.2 (08 Mar 2018 23:47)  T(F): 97.1 (09 Mar 2018 07:49), Max: 98.9 (08 Mar 2018 23:47)  HR: 76 (09 Mar 2018 07:49) (69 - 81)  BP: 130/51 (09 Mar 2018 07:49) (130/51 - 166/60)  BP(mean): --  RR: 18 (09 Mar 2018 07:49) (18 - 20)  SpO2: 98% (09 Mar 2018 07:49) (96% - 100%)    PHYSICAL EXAM:  GENERAL: NAD,   HEAD:  Atraumatic, Normocephalic  EYES:  Conjunctiva and sclera clear  NECK: Supple, No JVD,   CHEST/LUNG: Generalized Wheezing all over lung bases,  Decreased breath sound on Rt side., Clear to percussion bilaterally; No rales, rhonchi, wheezing, or rubs  HEART: Regular rate and rhythm; No murmurs, rubs, or gallops  ABDOMEN: Soft, Nontender, Nondistended; Bowel sounds present  NERVOUS SYSTEM:  Alert & Oriented X2   EXTREMITIES:  2+ Peripheral Pulses, No clubbing, cyanosis, or edema  SKIN; warm,     LABS:                        7.2    16.4  )-----------( 160      ( 09 Mar 2018 07:29 )             24.0     03-09    148<H>  |  112<H>  |  112<H>  ----------------------------<  121<H>  4.0   |  24  |  2.70<H>    Ca    8.4      09 Mar 2018 07:29  Phos  6.2     03-09  Mg     3.0     03-09          CAPILLARY BLOOD GLUCOSE      POCT Blood Glucose.: 134 mg/dL (09 Mar 2018 11:54)  POCT Blood Glucose.: 109 mg/dL (09 Mar 2018 07:42)  POCT Blood Glucose.: 231 mg/dL (08 Mar 2018 21:03)  POCT Blood Glucose.: 220 mg/dL (08 Mar 2018 16:22)      RADIOLOGY & ADDITIONAL TESTS: < from: Xray Chest 1 View- PORTABLE-Routine (03.09.18 @ 11:36) >  FINDINGS: Heart size appears within normal limits allowing for portable   technique. Superior mediastinal contour is unchanged. There is increasing   right upper lobe consolidation with new patchy airspace opacities   identified within the right lower lung field. Interstitial prominence on   the left likely reflects shallow inspiration. A small right pleural   effusion is demonstrated. There is no evidence for left pleural effusion.   No pneumothorax is demonstrated. No mediastinal shift is noted.    IMPRESSION: Increasing right upper lobe consolidation with new patchy   airspace opacity identified within the right lower lung field suggesting   multilobar pneumonia.            < end of copied text >      Imaging Personally Reviewed:  [ ] YES  [ ] NO    Consultant(s) Notes Reviewed:  [ ] YES  [ ] NO

## 2018-03-09 NOTE — PROGRESS NOTE ADULT - PROBLEM SELECTOR PLAN 2
Patient had one time blood tinged sputum yesterday,  She is tachypneic  CXR shows worse consolidation  f/u D dimers

## 2018-03-09 NOTE — PROGRESS NOTE ADULT - SUBJECTIVE AND OBJECTIVE BOX
Patient is a 89y old  Female who presents with a chief complaint of sob and cough.  Awake, alert, comfortable OOB to chair in NAD. Still with some sob but no wheezing.    INTERVAL HPI/OVERNIGHT EVENTS:      VITAL SIGNS:  T(F): 97.1 (03-09-18 @ 07:49)  HR: 76 (03-09-18 @ 07:49)  BP: 130/51 (03-09-18 @ 07:49)  RR: 18 (03-09-18 @ 07:49)  SpO2: 98% (03-09-18 @ 07:49)  Wt(kg): --  I&O's Detail    08 Mar 2018 07:01  -  09 Mar 2018 07:00  --------------------------------------------------------  IN:    Oral Fluid: 850 mL  Total IN: 850 mL    OUT:    Voided: 250 mL  Total OUT: 250 mL    Total NET: 600 mL              REVIEW OF SYSTEMS:    CONSTITUTIONAL:  No fevers, chills, sweats    HEENT:  Eyes:  No diplopia or blurred vision. ENT:  No earache, sore throat or runny nose.    CARDIOVASCULAR:  No pressure, squeezing, tightness, or heaviness about the chest; no palpitations.    RESPIRATORY:  Per HPI    GASTROINTESTINAL:  No abdominal pain, nausea, vomiting or diarrhea.    GENITOURINARY:  No dysuria, frequency or urgency.    NEUROLOGIC:  No paresthesias, fasciculations, seizures or weakness.    PSYCHIATRIC:  No disorder of thought or mood.      PHYSICAL EXAM:    Constitutional: Well developed and nourished  Eyes:Perrla  ENMT: normal  Neck:supple  Respiratory: good air entry  Cardiovascular: S1 S2 regular  Gastrointestinal: Soft, Non tender  Extremities: No edema  Vascular:normal  Neurological:Awake, alert  Musculoskeletal:Normal      MEDICATIONS  (STANDING):  ALBUTerol    0.083% 2.5 milliGRAM(s) Nebulizer every 15 minutes  ALBUTerol/ipratropium for Nebulization 3 milliLiter(s) Nebulizer every 6 hours  amLODIPine   Tablet 2.5 milliGRAM(s) Oral daily  aspirin enteric coated 81 milliGRAM(s) Oral daily  atorvastatin 20 milliGRAM(s) Oral at bedtime  epoetin anju Injectable 3000 Unit(s) SubCutaneous <User Schedule>  folic acid 1 milliGRAM(s) Oral daily  heparin  Injectable 5000 Unit(s) SubCutaneous every 12 hours  insulin glargine Injectable (LANTUS) 5 Unit(s) SubCutaneous at bedtime  insulin glargine Injectable (LANTUS) 10 Unit(s) SubCutaneous every morning  insulin lispro (HumaLOG) corrective regimen sliding scale   SubCutaneous three times a day before meals  iron sucrose IVPB 100 milliGRAM(s) IV Intermittent every 48 hours  levothyroxine 50 MICROGram(s) Oral daily  methylPREDNISolone sodium succinate Injectable 40 milliGRAM(s) IV Push every 8 hours  nystatin Ointment 1 Application(s) Topical daily  piperacillin/tazobactam IVPB. 3.375 Gram(s) IV Intermittent every 12 hours  sevelamer hydrochloride 800 milliGRAM(s) Oral three times a day with meals    MEDICATIONS  (PRN):  guaiFENesin    Syrup 100 milliGRAM(s) Oral every 6 hours PRN Cough      Allergies    No Known Allergies    Intolerances        LABS:                        7.2    16.4  )-----------( 160      ( 09 Mar 2018 07:29 )             24.0     03-09    148<H>  |  112<H>  |  112<H>  ----------------------------<  121<H>  4.0   |  24  |  2.70<H>    Ca    8.4      09 Mar 2018 07:29  Phos  6.2     03-09  Mg     3.0     03-09                CAPILLARY BLOOD GLUCOSE      POCT Blood Glucose.: 134 mg/dL (09 Mar 2018 11:54)  POCT Blood Glucose.: 109 mg/dL (09 Mar 2018 07:42)  POCT Blood Glucose.: 231 mg/dL (08 Mar 2018 21:03)  POCT Blood Glucose.: 220 mg/dL (08 Mar 2018 16:22)    pro-bnp [id number] 03-04 @ 12:05     d-dimer --  03-04 @ 12:05      RADIOLOGY & ADDITIONAL TESTS:    CXR:  < from: Xray Chest 1 View- PORTABLE-Routine (03.09.18 @ 11:36) >  IMPRESSION: Increasing right upper lobe consolidation with new patchy   airspace opacity identified within the right lower lung field suggesting   multilobar pneumonia.        < end of copied text >    Ct scan chest:  < from: CT Chest No Cont (03.07.18 @ 14:24) >  IMPRESSION:   Small right pleural effusion with consolidation in the right upper lobe,   probably pneumonia.    Scattered patchy opacities in the right lung, possibly endobronchial   spread of infection.    < end of copied text >    ekg;    echo:

## 2018-03-09 NOTE — PROGRESS NOTE ADULT - SUBJECTIVE AND OBJECTIVE BOX
CHIEF COMPLAINT:Patient is a 89y old  Female who presents with a chief complaint of sob. Pt appears comfortable.  	  REVIEW OF SYSTEMS:  CONSTITUTIONAL: No fever, weight loss, or fatigue  EYES: No eye pain, visual disturbances, or discharge  ENT:  No difficulty hearing, tinnitus, vertigo; No sinus or throat pain  NECK: No pain or stiffness  RESPIRATORY: No cough, wheezing, chills or hemoptysis; No Shortness of Breath  CARDIOVASCULAR: No chest pain, palpitations, passing out, dizziness, or leg swelling  GASTROINTESTINAL: No abdominal or epigastric pain. No nausea, vomiting, or hematemesis; No diarrhea or constipation. No melena or hematochezia.  GENITOURINARY: No dysuria, frequency, hematuria, or incontinence  NEUROLOGICAL: No headaches, memory loss, loss of strength, numbness, or tremors  SKIN: No itching, burning, rashes, or lesions   LYMPH Nodes: No enlarged glands  ENDOCRINE: No heat or cold intolerance; No hair loss  MUSCULOSKELETAL: No joint pain or swelling; No muscle, back, or extremity pain  PSYCHIATRIC: No depression, anxiety, mood swings, or difficulty sleeping  HEME/LYMPH: No easy bruising, or bleeding gums  ALLERGY AND IMMUNOLOGIC: No hives or eczema	      PHYSICAL EXAM:  T(C): 36.2 (03-09-18 @ 07:49), Max: 37.4 (03-08-18 @ 11:08)  HR: 76 (03-09-18 @ 07:49) (69 - 81)  BP: 130/51 (03-09-18 @ 07:49) (130/51 - 166/60)  RR: 18 (03-09-18 @ 07:49) (18 - 20)  SpO2: 98% (03-09-18 @ 07:49) (96% - 100%)  Wt(kg): --  I&O's Summary    08 Mar 2018 07:01  -  09 Mar 2018 07:00  --------------------------------------------------------  IN: 850 mL / OUT: 250 mL / NET: 600 mL        Appearance: Normal	  HEENT:   Normal oral mucosa, PERRL, EOMI	  Lymphatic: No lymphadenopathy  Cardiovascular: Normal S1 S2, No JVD, No murmurs, No edema  Respiratory: Lungs clear to auscultation	  Gastrointestinal:  Soft, Non-tender, + BS	  Skin: No rashes, No ecchymoses, No cyanosis	  Extremities: Normal range of motion, No clubbing, cyanosis or edema  Vascular: Peripheral pulses palpable 2+ bilaterally    MEDICATIONS  (STANDING):  ALBUTerol    0.083% 2.5 milliGRAM(s) Nebulizer every 15 minutes  ALBUTerol/ipratropium for Nebulization 3 milliLiter(s) Nebulizer every 6 hours  amLODIPine   Tablet 2.5 milliGRAM(s) Oral daily  aspirin enteric coated 81 milliGRAM(s) Oral daily  atorvastatin 20 milliGRAM(s) Oral at bedtime  azithromycin   Tablet 500 milliGRAM(s) Oral daily  cefTRIAXone   IVPB 1 Gram(s) IV Intermittent every 24 hours  epoetin anju Injectable 3000 Unit(s) SubCutaneous <User Schedule>  folic acid 1 milliGRAM(s) Oral daily  heparin  Injectable 5000 Unit(s) SubCutaneous every 12 hours  insulin glargine Injectable (LANTUS) 5 Unit(s) SubCutaneous at bedtime  insulin glargine Injectable (LANTUS) 10 Unit(s) SubCutaneous every morning  insulin lispro (HumaLOG) corrective regimen sliding scale   SubCutaneous three times a day before meals  iron sucrose IVPB 100 milliGRAM(s) IV Intermittent every 48 hours  levothyroxine 50 MICROGram(s) Oral daily  methylPREDNISolone sodium succinate Injectable 40 milliGRAM(s) IV Push every 8 hours  nystatin Ointment 1 Application(s) Topical daily  sevelamer hydrochloride 800 milliGRAM(s) Oral three times a day with meals      	  LABS:	 	    proBNP: Serum Pro-Brain Natriuretic Peptide: 8534 pg/mL (03-04 @ 12:05)    Lipid Profile: Cholesterol 67  LDL 27  HDL 23  TG 84    HgA1c: Hemoglobin A1C, Whole Blood: 6.0 % (03-04 @ 21:20)    TSH: Thyroid Stimulating Hormone, Serum: 2.12 uU/mL (03-04 @ 18:20)

## 2018-03-09 NOTE — CHART NOTE - NSCHARTNOTEFT_GEN_A_CORE
Paged because pt has multiple hematomas to Lt upper arm, and RLQ from injection sites. Pt remains hemodynamically stable, and in no acute distress.  Will hold heparin sub q, and ASA for now, and place pt on venodyne boots.  Sending repeat CBC stat.      Pt also Legionella antibody positive.  Will start renally dosed levaquin, and place pt on isolation.     Vital Signs Last 24 Hrs  T(C): 36.2 (09 Mar 2018 20:35), Max: 37.2 (08 Mar 2018 23:47)  T(F): 97.1 (09 Mar 2018 20:35), Max: 98.9 (08 Mar 2018 23:47)  HR: 74 (09 Mar 2018 20:35) (69 - 79)  BP: 146/82 (09 Mar 2018 20:35) (130/51 - 166/60)  RR: 18 (09 Mar 2018 20:35) (18 - 20)  SpO2: 99% (09 Mar 2018 20:35) (97% - 100%) Paged because pt has multiple hematomas to Lt upper arm, and RLQ from injection sites. Pt remains hemodynamically stable, and in no acute distress.  Will hold heparin sub q, and ASA for now, and place pt on venodyne boots.  Repeat CBC concerning for Hb of 6.2.  Will transufuse 1 unit pRBC's stat.       Pt also Legionella antibody positive.  Dr. Chen contacted.  Will start renally dosed levaquin, and Vancomycin stat.    Vital Signs Last 24 Hrs  T(C): 36.2 (09 Mar 2018 20:35), Max: 37.2 (08 Mar 2018 23:47)  T(F): 97.1 (09 Mar 2018 20:35), Max: 98.9 (08 Mar 2018 23:47)  HR: 74 (09 Mar 2018 20:35) (69 - 79)  BP: 146/82 (09 Mar 2018 20:35) (130/51 - 166/60)  RR: 18 (09 Mar 2018 20:35) (18 - 20)  SpO2: 99% (09 Mar 2018 20:35) (97% - 100%) Paged because pt has multiple hematomas to Lt upper arm, and RLQ from injection sites. Pt remains hemodynamically stable, and in no acute distress.  Will hold heparin sub q, and ASA for now, and place pt on venodyne boots.  Repeat CBC concerning for Hb of 6.2.  Will transfuse 1 unit pRBC's stat.       Pt also Legionella antibody positive.  Need to attain Legionella IgG, and IgM to know if this is active or prior infection.  Will send repeat Urine legionella, and urine lytes as pt appears to be hypernatremic.  May be 2/2 SIADH.   Dr. Chen contacted.  Will start renally dosed levaquin, and Vancomycin stat.  Follow vanc trough in AM.    Vital Signs Last 24 Hrs  T(C): 36.2 (09 Mar 2018 20:35), Max: 37.2 (08 Mar 2018 23:47)  T(F): 97.1 (09 Mar 2018 20:35), Max: 98.9 (08 Mar 2018 23:47)  HR: 74 (09 Mar 2018 20:35) (69 - 79)  BP: 146/82 (09 Mar 2018 20:35) (130/51 - 166/60)  RR: 18 (09 Mar 2018 20:35) (18 - 20)  SpO2: 99% (09 Mar 2018 20:35) (97% - 100%) Paged because pt has multiple hematomas to Lt upper arm, and RLQ from injection sites. Pt remains hemodynamically stable, and in no acute distress.  Will hold heparin sub q, and ASA for now, and place pt on venodyne boots.  Repeat CBC concerning for Hb of 6.2.  Will transfuse 1 unit pRBC's stat.       Pt also Legionella antibody positive.  Need to attain Legionella IgG, and IgM to know if this is active or prior infection.  Will send repeat Urine legionella, and urine lytes as pt appears to be hypernatremic.   Dr. Chen contacted.  Will start renally dosed levaquin, and Vancomycin stat.  Follow vanc trough in AM.    Vital Signs Last 24 Hrs  T(C): 36.2 (09 Mar 2018 20:35), Max: 37.2 (08 Mar 2018 23:47)  T(F): 97.1 (09 Mar 2018 20:35), Max: 98.9 (08 Mar 2018 23:47)  HR: 74 (09 Mar 2018 20:35) (69 - 79)  BP: 146/82 (09 Mar 2018 20:35) (130/51 - 166/60)  RR: 18 (09 Mar 2018 20:35) (18 - 20)  SpO2: 99% (09 Mar 2018 20:35) (97% - 100%)

## 2018-03-09 NOTE — PROGRESS NOTE ADULT - SUBJECTIVE AND OBJECTIVE BOX
Meds:  cefTRIAXone   IVPB 1 Gram(s) IV Intermittent every 24 hours    Allergies:  Allergies    No Known Allergies    Intolerances      ROS  [  ] UNABLE TO ELICIT    General:  [  ] None  [ x ] Fever  [  ] Chills  [  ] Malaise    Skin:  [ x ] None [  ] Rash  [  ] Wound  [  ] Ulcer    HEENT:  [ x ] None  [  ] Sore Throat  [  ] Nasal congestion/ runny nose  [  ] Photophobia [  ] Neck pain      Chest:  [  ] None   [ x ] SOB  [ x ] Cough  [  ] None    Cardiovascular:   [ x ] None  [  ] CP  [  ] Palpitation    Gastrointestinal:  [ x ] None  [  ] Abd pain   [  ] Nausea    [  ] Vomiting   [  ] Diarrhea	     Genitourinary:  [ x ] None [  ] Polyuria   [  ] Urgency  [  ] Frequency  [  ] Dysuria    [  ]  Hematuria       Musculoskeletal:  [  ] None [  ] Back Pain	[  ] Body aches  [  ] Joint pain [ x ] legs edema    Neurological: [  ] None [  ]Dizziness  [  ]Visual Disturbance  [  ]Headaches   [ x ] Weakness          PHYSICAL EXAM:  Vital Signs Last 24 Hrs  T(C): 36.4 (09 Mar 2018 04:51), Max: 37.4 (08 Mar 2018 11:08)  T(F): 97.5 (09 Mar 2018 04:51), Max: 99.3 (08 Mar 2018 11:08)  HR: 69 (09 Mar 2018 04:51) (69 - 81)  BP: 166/60 (09 Mar 2018 04:51) (131/49 - 166/60)  BP(mean): --  RR: 18 (09 Mar 2018 04:51) (18 - 20)  SpO2: 100% (09 Mar 2018 04:51) (96% - 100%)    Constitutional:    HEENT: [ x ] Wnl  [  ] Pharyngeal congestion    Neck:  [  ] Supple  [  ]Lymphadenopathy  [  ] No JVD   [  ] JVD  [  ] Masses   [  ] WNL    CHEST/Respiratory:  [  ]Clear to auscultation  [ x ] Rales   [  ] Rhonchi   [ x ] Wheezing     [  ] Chest Tenderness      Cardiovascular:  [ x ] Reg S1 S2   [  ] Irreg S1 S2   [ x ]No Murmur  [  ] +ve Murmurs  [  ]Systolic [  ]Diastolic      Abdomen:  [ x ] Soft  [  ] No tendrerness  [  ] Tenderness  [  ] Organomegaly  [  ] ABD Distention  [  ] Rigidity                       [ x ] No Rigidity                       [ x ] No Rebound Tenderness  [  ] No Guarding Rigidity  [  ] Rebound Tenderness[  ] Guarding Rigidity                          [ x ]  +ve Bowel Sounds  [  ] Decreased Bowel Sounds    [  ] Absent Bowel Sounds                            Extremities: [  ] No edema [ x ] Edema  [  ] Clubbing   [  ] Cyanosis                         [ x ] No Tender Calf muscles  [  ] Tender Calf muscles                        [ x ] Palpable peripheral pulses    Neurological: [ x ] Awake  [ x ] Alert  [  ] Oriented  x                             [  ] Confused  [  ] Drowsy  [  ] respond to painful stimuli  [  ] Unresponsive    Skin:  [ x ] Intact [  ] Redness [  ] Thrombophlebitis  [  ] Rashes  [  ] Dry  [  ] Ulcers    Ortho:  [  ] Joint Swelling  [  ] Joint erythema [  ] Joint tenderness                [  ] Increased temp. to touch  [  ] DJD [ x ] WNL          LABS/DIAGNOSTIC TESTS                        8.2    18.0  )-----------( 189      ( 07 Mar 2018 06:42 )             26.1   03-07    145  |  109<H>  |  116<H>  ----------------------------<  142<H>  3.5   |  23  |  3.64<H>    Ca    8.5      07 Mar 2018 06:42  Phos  6.4     03-07  Mg     3.0     03-07    CAPILLARY BLOOD GLUCOSE      POCT Blood Glucose.: 231 mg/dL (08 Mar 2018 21:03)  POCT Blood Glucose.: 220 mg/dL (08 Mar 2018 16:22)  POCT Blood Glucose.: 197 mg/dL (08 Mar 2018 11:29)  POCT Blood Glucose.: 130 mg/dL (08 Mar 2018 07:48)      Procalcitonin, Serum: 0.94: Procalcitonin (PCT) Interpretation (ng/mL) - Diagnosis of systemic  bacterial infection/sepsis  PCT < 0.5: Systemic infection (sepsis) is not likely and risk for  progression to severe systemic infection is low. Local bacterial  infection is possible. If early sepsis is suspected clinically, PCT  should be re-assessed in 6-24 hours.  PCT >/= 0.5 but < 2.0: Systemic infection (sepsis) is possible, but other  conditions are known to elevate PCT as well. Moderate risk for  progression to severe systemic infection. The patient should be closely  monitored both clinically and by re-assessing PCT within 6-24 hours.  PCT >/= 2.0 but < 10.0: Systemic infection (sepsis) is likely, unless  other causes are known. High risk of progression to severe systemic  infection (severe sepsis/septic shock).  PCT >/= 10.0: Important systemic inflammatory response, almost  exclusively due to severe bacterial sepsis or septic shock. High  likelihood of severe sepsis or septic shock. ng/mL (03.06.18 @ 04:30)          CULTURES:   Culture - Sputum . (03.08.18 @ 17:01)    Gram Stain:   Rare polymorphonuclear leukocytes per low power field  No Squamous epithelial cells per low power field  Few Gram Positive Cocci in Clusters per oil power field    Specimen Source: .Sputum Sputum      Culture - Blood (03.04.18 @ 21:44)    Specimen Source: .Blood Blood    Culture Results:   No growth to date.    Culture - Blood (03.04.18 @ 21:44)    Specimen Source: .Blood Blood    Culture Results:   No growth to date.        RADIOLOGY:    EXAM:  CT CHEST                            PROCEDURE DATE:  03/07/2018          INTERPRETATION:  CLINICAL INFORMATION: Wheezing.    COMPARISON: None.    PROCEDURE:   CT of the Chest was performed without intravenous contrast.  Sagittal and coronal reformats were performed.    FINDINGS:    LUNGS AND LARGE AIRWAYS: Consolidation in the right upper lobe. Scattered   patchy opacities in the remainder of the right lung. Passive atelectasis   in the right lower lobe.  PLEURA: Small right pleural effusion.  VESSELS: Atherosclerotic calcifications.   HEART: Cardiomegaly. No pericardial effusion.  MEDIASTINUM AND EARLENE: No lymphadenopathy.  CHEST WALL AND LOWER NECK: Within normal limits.  VISUALIZED UPPER ABDOMEN: Within normal limits.  BONES: Degenerative changes of the spine.    IMPRESSION:   Small right pleural effusion with consolidation in the right upper lobe,   probably pneumonia.    Scattered patchy opacities in the right lung, possibly endobronchial   spread of infection.      Assessment and Recommendation:   89 y/o F from home with HHA  walks with walker w/ PMHx of CHF unknown EF, CKD ( as per daughter one of her kidneys is not very functional), asthma, anemia,  hypothyroid, and HTN BIB EMS for SOB,  productive cough and fever.  Patient was admitted for pneumonia and CHF and was started on IV Rocephin.  CT chest suggested pneumonia.    Problem/Recommendation - 1:  Problem: Pneumonia.   Recommendation:   1- Continue IV Rocephin.  2- procalcitonin level is improving.  3- O2 as needed.  4- Pulmonary management.  5- Repeat CBC and BMP for follow up.  6- Follow blood cultures till final reports.  7- Follow up CXR.    Problem/Recommendation - 2:  ·  Problem: COPD exacerbation.    Recommendation:   1- Bronchodilators.  2- O2 as needed.  3- Pulmonary evaluation and management.  4- Steroids as per primary, and pulmonary team.     Problem/Recommendation - 3:  ·  Problem: Anemia.    Recommendation:   1- Closely monitor H & H.  2- Observe for bleeding.     Problem/Recommendation - 4:  ·  Problem: CHF (congestive heart failure).    Recommendation:   1- Monitor Blood pressure closely.  2- Blood pressure control.  3- BP. and cardiac meds and management as per cardiology and primary care team.     Discussed with medical resident.

## 2018-03-09 NOTE — PROGRESS NOTE ADULT - PROBLEM SELECTOR PLAN 1
Patient presented with leukocytosis, fevers, productive cough and consolidation on CXR suggestive of Pneumonia  procalcitonin is 1.02  Patient is not clinically improved on current anti  - Urine legionella antibody positive but urine legionella negative  -Blood culture normal  ID Dr. Chen

## 2018-03-09 NOTE — PROGRESS NOTE ADULT - PROBLEM SELECTOR PLAN 5
Pt with worsening LE pedal edema  and JVD, EKG with Sinus lianet @ 57 with junctional rhythm. pt was offered peacemaker in the past but was declined due to age on admission  will not start on PO lasix  No BB patient is bradycardiac  Cardio: Dr Benjamin

## 2018-03-09 NOTE — PROGRESS NOTE ADULT - SUBJECTIVE AND OBJECTIVE BOX
Problem List:  CKD stage 5.  Hypovolemia due to reduced po intake  Pneumonia and acute bronchitis      PAST MEDICAL & SURGICAL HISTORY:  HTN (hypertension)  History of thyroid disorder  CHF (congestive heart failure)  No significant past surgical history      No Known Allergies      MEDICATIONS  (STANDING):  ALBUTerol    0.083% 2.5 milliGRAM(s) Nebulizer every 15 minutes  ALBUTerol/ipratropium for Nebulization 3 milliLiter(s) Nebulizer every 6 hours  amLODIPine   Tablet 2.5 milliGRAM(s) Oral daily  aspirin enteric coated 81 milliGRAM(s) Oral daily  atorvastatin 20 milliGRAM(s) Oral at bedtime  azithromycin   Tablet 500 milliGRAM(s) Oral daily  cefTRIAXone   IVPB 1 Gram(s) IV Intermittent every 24 hours  epoetin anju Injectable 3000 Unit(s) SubCutaneous <User Schedule>  folic acid 1 milliGRAM(s) Oral daily  heparin  Injectable 5000 Unit(s) SubCutaneous every 12 hours  insulin glargine Injectable (LANTUS) 5 Unit(s) SubCutaneous at bedtime  insulin glargine Injectable (LANTUS) 10 Unit(s) SubCutaneous every morning  insulin lispro (HumaLOG) corrective regimen sliding scale   SubCutaneous three times a day before meals  iron sucrose IVPB 100 milliGRAM(s) IV Intermittent every 48 hours  levothyroxine 50 MICROGram(s) Oral daily  methylPREDNISolone sodium succinate Injectable 40 milliGRAM(s) IV Push every 8 hours  nystatin Ointment 1 Application(s) Topical daily  sevelamer hydrochloride 800 milliGRAM(s) Oral three times a day with meals    MEDICATIONS  (PRN):  guaiFENesin    Syrup 100 milliGRAM(s) Oral every 6 hours PRN Cough    Basic Metabolic Panel (03.07.18 @ 06:42)    Sodium, Serum: 145 mmol/L    Potassium, Serum: 3.5 mmol/L    Chloride, Serum: 109 mmol/L    Carbon Dioxide, Serum: 23 mmol/L    Anion Gap, Serum: 13 mmol/L    Blood Urea Nitrogen, Serum: 116 mg/dL    Creatinine, Serum: 3.64 mg/dL    Glucose, Serum: 142 mg/dL    Calcium, Total Serum: 8.5 mg/dL    eGFR if Non : 9: Interpretative comment  The units for eGFR are ml/min/1.73m2 (normalized body surface area). The                            7.2    16.4  )-----------( 160      ( 09 Mar 2018 07:29 )             24.0     03-09    148<H>  |  112<H>  |  112<H>  ----------------------------<  121<H>  4.0   |  24  |  2.70<H>    Ca    8.4      09 Mar 2018 07:29  Phos  6.2     03-09  Mg     3.0     03-09    EGFR 14 ml/min        Osmolality, Random Urine: 374 mos/kg (03-05 @ 21:44)  Sodium, Random Urine: 22 mmol/L (03-05 @ 21:44)  Potassium, Random Urine: 39 mmol/L (03-05 @ 21:44)  Creatinine, Random Urine: 99 mg/dL (03-05 @ 21:44)      REVIEW OF SYSTEMS:  General: fatigue and malaise  EYES/ENT: No visual changes;  No vertigo, no headache.  RESPIRATORY: wheezing attack earlier in am , now improved.  CARDIOVASCULAR: No chest pain or palpitations. No Edema  GASTROINTESTINAL: reduced appetite to less than 50 % as per family  GENITOURINARY: No dysuria, frequency.        VITALS:  T(F): 97.1 (03-09-18 @ 07:49), Max: 99.3 (03-08-18 @ 11:08)  HR: 76 (03-09-18 @ 07:49)  BP: 130/51 (03-09-18 @ 07:49)  RR: 18 (03-09-18 @ 07:49)  SpO2: 98% (03-09-18 @ 07:49)  Wt(kg): --    03-08 @ 07:01  -  03-09 @ 07:00  --------------------------------------------------------  IN: 850 mL / OUT: 250 mL / NET: 600 mL        PHYSICAL EXAM:  Constitutional: well developed, no diaphoresis, no distress.  Neck: No JVD, no carotid bruit, supple, no adenopathy  Respiratory: air entrance B/L, no wheezes, rales or rhonchi at present  Cardiovascular: S1, S2, RRR, no pericardial rub, no murmur  Abdomen: BS+, soft, no tenderness, no bruit  Pelvis: bladder nondistended  Extremities: No cyanosis or clubbing. No peripheral edema.

## 2018-03-10 NOTE — CONSULT NOTE ADULT - SUBJECTIVE AND OBJECTIVE BOX
NATASHA DAI    HPI/Interval events:        REVIEW OF SYSTEMS:  CONSTITUTIONAL: No fever, weight loss, or fatigue  EYES: No eye pain, visual disturbances, or discharge  ENMT:  No difficulty hearing, tinnitus, vertigo; No sinus or throat pain  NECK: No pain or stiffness  BREASTS: No pain, masses, or nipple discharge  RESPIRATORY: No cough, wheezing, chills or hemoptysis; No shortness of breath  CARDIOVASCULAR: No chest pain, palpitations, dizziness, or leg swelling  GASTROINTESTINAL: No abdominal or epigastric pain. No nausea, vomiting, or hematemesis; No diarrhea or constipation. No melena or hematochezia.  GENITOURINARY: No dysuria, frequency, hematuria, or incontinence  NEUROLOGICAL: No headaches, memory loss, loss of strength, numbness, or tremors  SKIN: No itching, burning, rashes, or lesions   LYMPH NODES: No enlarged glands  ENDOCRINE: No heat or cold intolerance; No hair loss  MUSCULOSKELETAL: No joint pain or swelling; No muscle, back, or extremity pain  PSYCHIATRIC: No depression, anxiety, mood swings, or difficulty sleeping  HEME/LYMPH: No easy bruising, or bleeding gums  ALLERY AND IMMUNOLOGIC: No hives or eczema    T(C): 36.2 (03-09-18 @ 20:35), Max: 36.4 (03-09-18 @ 04:51)  HR: 77 (03-10-18 @ 00:22) (69 - 81)  BP: 137/120 (03-10-18 @ 00:22) (100/26 - 166/60)  RR: 20 (03-10-18 @ 00:22) (18 - 20)  SpO2: 99% (03-09-18 @ 20:35) (98% - 100%)  Wt(kg): --Vital Signs Last 24 Hrs  T(C): 36.2 (09 Mar 2018 20:35), Max: 36.4 (09 Mar 2018 04:51)  T(F): 97.1 (09 Mar 2018 20:35), Max: 97.5 (09 Mar 2018 04:51)  HR: 77 (10 Mar 2018 00:22) (69 - 81)  BP: 137/120 (10 Mar 2018 00:22) (100/26 - 166/60)  BP(mean): --  RR: 20 (10 Mar 2018 00:22) (18 - 20)  SpO2: 99% (09 Mar 2018 20:35) (98% - 100%)    PHYSICAL EXAM:  GENERAL: NAD, well-groomed, well-developed  HEAD:  Atraumatic, Normocephalic  EYES: EOMI, PERRLA, conjunctiva and sclera clear  ENMT: No tonsillar erythema, exudates, or enlargement; Moist mucous membranes, Good dentition, No lesions  NECK: Supple, No JVD, Normal thyroid  NERVOUS SYSTEM:  Alert & Oriented X3, Good concentration; Motor Strength 5/5 B/L upper and lower extremities; DTRs 2+ intact and symmetric  CHEST/LUNG: Clear to percussion bilaterally; No rales, rhonchi, wheezing, or rubs  HEART: Regular rate and rhythm; No murmurs, rubs, or gallops  ABDOMEN: Soft, Nontender, Nondistended; Bowel sounds present  EXTREMITIES:  2+ Peripheral Pulses, No clubbing, cyanosis, or edema  LYMPH: No lymphadenopathy noted  SKIN: No rashes or lesions    Consultant(s) Notes Reviewed:  [x ] YES  [ ] NO  Care Discussed with Consultants/Other Providers [ x] YES  [ ] NO    LABS:                        6.2    19.3  )-----------( 170      ( 09 Mar 2018 21:47 )             20.1     03-09    149<H>  |  112<H>  |  112<H>  ----------------------------<  193<H>  4.2   |  24  |  2.53<H>    Ca    8.4      09 Mar 2018 21:47  Phos  6.2     03-09  Mg     3.0     03-09      PT/INR - ( 09 Mar 2018 21:47 )   PT: 14.6 sec;   INR: 1.33 ratio         PTT - ( 09 Mar 2018 21:47 )  PTT:76.4 sec    CAPILLARY BLOOD GLUCOSE      POCT Blood Glucose.: 199 mg/dL (09 Mar 2018 19:47)  POCT Blood Glucose.: 147 mg/dL (09 Mar 2018 16:19)  POCT Blood Glucose.: 134 mg/dL (09 Mar 2018 11:54)  POCT Blood Glucose.: 109 mg/dL (09 Mar 2018 07:42)            RADIOLOGY & ADDITIONAL TESTS:    Imaging Personally Reviewed:  [ ] YES  [ ] NO NATASHA DAI    HPI/Interval events:    89 y/o F from home with HHA  walks with walker w/ PMHx of HFpEF (GII DD), pulmonary hypertension, CKD,  asthma, anemia,  hypothyroid, HTN BIB EMS for SOB,  productive cough and fever.  Admission CXR and CT chest showed RUL infiltrate, RVP negative. Patient was initially treated with azithromycin and ceftriaxone for CAP.  Patient did not improve clinically, and 3/9 repeat CXR showed worsening RUL pneumonia, and patient was started on zosyn for hospital acquired pneumonia.     Intern on night float was called for oozing puncture sites.  Repeat Hgb 6.2.  DIC workup was negative with normal fibrinogen assay and normal platelets  PTT elevated to 76.4 and INR 1.33    BUN/Cr 112/2.53.  LDH elevated with haptoglobin and retic count pending.    Patient endorses left flank pain  Family at bedside to translate    REVIEW OF SYSTEMS:  CONSTITUTIONAL: + fatigue  EYES: No visual disturbances  ENMT:  No  throat pain  NECK: No pain o  RESPIRATORY: + cough with small amount blood  CARDIOVASCULAR: No chest pain  GASTROINTESTINAL: + L flank pain, + constipation  GENITOURINARY: No dysuria  SKIN: No  rashes  MUSCULOSKELETAL: No joint pain or swelling; + back pain      T(C): 36.2 (03-09-18 @ 20:35), Max: 36.4 (03-09-18 @ 04:51)  HR: 77 (03-10-18 @ 00:22) (69 - 81)  BP: 137/120 (03-10-18 @ 00:22) (100/26 - 166/60)  RR: 20 (03-10-18 @ 00:22) (18 - 20)  SpO2: 99% (03-09-18 @ 20:35) (98% - 100%)  Wt(kg): --Vital Signs Last 24 Hrs  T(C): 36.2 (09 Mar 2018 20:35), Max: 36.4 (09 Mar 2018 04:51)  T(F): 97.1 (09 Mar 2018 20:35), Max: 97.5 (09 Mar 2018 04:51)  HR: 77 (10 Mar 2018 00:22) (69 - 81)  BP: 137/120 (10 Mar 2018 00:22) (100/26 - 166/60)  BP(mean): --  RR: 20 (10 Mar 2018 00:22) (18 - 20)  SpO2: 99% (09 Mar 2018 20:35) (98% - 100%)    PHYSICAL EXAM:  GENERAL: mild distress from overall pain, well-groomed, well-developed  HEAD:  Atraumatic, Normocephalic  EYES: EOMI, conjunctiva and sclera clear  ENMT: dry mucous membranes, No lesions  NECK: Supple, No JVD  NERVOUS SYSTEM:  Alert; Motor Strength 5/5 B/L upper and lower extremities  CHEST/LUNG: Clear to percussion bilaterally; No wheezing  HEART: Regular rate and rhythm; No murmurs  ABDOMEN: Soft, distended, + firmness and tenderness left flank  EXTREMITIES: No clubbing, cyanosis, or edema  LYMPH: No lymphadenopathy noted  SKIN: No rashes     Consultant(s) Notes Reviewed:  [x ] YES  [ ] NO  Care Discussed with Consultants/Other Providers [ x] YES  [ ] NO    LABS:                        6.2    19.3  )-----------( 170      ( 09 Mar 2018 21:47 )             20.1     03-09    149<H>  |  112<H>  |  112<H>  ----------------------------<  193<H>  4.2   |  24  |  2.53<H>    Ca    8.4      09 Mar 2018 21:47  Phos  6.2     03-09  Mg     3.0     03-09      PT/INR - ( 09 Mar 2018 21:47 )   PT: 14.6 sec;   INR: 1.33 ratio         PTT - ( 09 Mar 2018 21:47 )  PTT:76.4 sec    CAPILLARY BLOOD GLUCOSE      POCT Blood Glucose.: 199 mg/dL (09 Mar 2018 19:47)  POCT Blood Glucose.: 147 mg/dL (09 Mar 2018 16:19)  POCT Blood Glucose.: 134 mg/dL (09 Mar 2018 11:54)  POCT Blood Glucose.: 109 mg/dL (09 Mar 2018 07:42)    RADIOLOGY & ADDITIONAL TESTS:    Imaging Personally Reviewed:  [x ] YES  [ ] NO NATASHA DAI    HPI/Interval events:    89 y/o F from home with HHA  walks with walker w/ PMHx of HFpEF (GII DD), pulmonary hypertension, CKD,  asthma, anemia,  hypothyroid, HTN BIB EMS for SOB,  productive cough and fever.  Admission CXR and CT chest showed RUL infiltrate, RVP negative. Patient was initially treated with azithromycin and ceftriaxone for CAP.  Patient did not improve clinically, and 3/9 repeat CXR showed worsening RUL pneumonia, and patient was started on zosyn for hospital acquired pneumonia.     Intern on night float was called for oozing puncture sites.  Repeat Hgb 6.2.  DIC workup was negative with normal fibrinogen assay and normal platelets  PTT elevated to 76.4 and INR 1.33    BUN/Cr 112/2.53.  LDH elevated with haptoglobin and retic count pending.    CT abdomen/pelvis: moderate size left flank hematoma    Patient endorses left flank pain  Family at bedside to translate    REVIEW OF SYSTEMS:  CONSTITUTIONAL: + fatigue  EYES: No visual disturbances  ENMT:  No  throat pain  NECK: No pain o  RESPIRATORY: + cough with small amount blood  CARDIOVASCULAR: No chest pain  GASTROINTESTINAL: + L flank pain, + constipation  GENITOURINARY: No dysuria  SKIN: No  rashes  MUSCULOSKELETAL: No joint pain or swelling; + back pain      T(C): 36.2 (03-09-18 @ 20:35), Max: 36.4 (03-09-18 @ 04:51)  HR: 77 (03-10-18 @ 00:22) (69 - 81)  BP: 137/120 (03-10-18 @ 00:22) (100/26 - 166/60)  RR: 20 (03-10-18 @ 00:22) (18 - 20)  SpO2: 99% (03-09-18 @ 20:35) (98% - 100%)  Wt(kg): --Vital Signs Last 24 Hrs  T(C): 36.2 (09 Mar 2018 20:35), Max: 36.4 (09 Mar 2018 04:51)  T(F): 97.1 (09 Mar 2018 20:35), Max: 97.5 (09 Mar 2018 04:51)  HR: 77 (10 Mar 2018 00:22) (69 - 81)  BP: 137/120 (10 Mar 2018 00:22) (100/26 - 166/60)  BP(mean): --  RR: 20 (10 Mar 2018 00:22) (18 - 20)  SpO2: 99% (09 Mar 2018 20:35) (98% - 100%)    PHYSICAL EXAM:  GENERAL: mild distress from overall pain, well-groomed, well-developed  HEAD:  Atraumatic, Normocephalic  EYES: EOMI, conjunctiva and sclera clear  ENMT: dry mucous membranes, No lesions  NECK: Supple, No JVD  NERVOUS SYSTEM:  Alert; Motor Strength 5/5 B/L upper and lower extremities  CHEST/LUNG: Clear to percussion bilaterally; No wheezing  HEART: Regular rate and rhythm; No murmurs  ABDOMEN: Soft, distended, + firmness and tenderness left flank  EXTREMITIES: No clubbing, cyanosis, or edema  LYMPH: No lymphadenopathy noted  SKIN: No rashes     Consultant(s) Notes Reviewed:  [x ] YES  [ ] NO  Care Discussed with Consultants/Other Providers [ x] YES  [ ] NO    LABS:                        6.2    19.3  )-----------( 170      ( 09 Mar 2018 21:47 )             20.1     03-09    149<H>  |  112<H>  |  112<H>  ----------------------------<  193<H>  4.2   |  24  |  2.53<H>    Ca    8.4      09 Mar 2018 21:47  Phos  6.2     03-09  Mg     3.0     03-09      PT/INR - ( 09 Mar 2018 21:47 )   PT: 14.6 sec;   INR: 1.33 ratio         PTT - ( 09 Mar 2018 21:47 )  PTT:76.4 sec    CAPILLARY BLOOD GLUCOSE      POCT Blood Glucose.: 199 mg/dL (09 Mar 2018 19:47)  POCT Blood Glucose.: 147 mg/dL (09 Mar 2018 16:19)  POCT Blood Glucose.: 134 mg/dL (09 Mar 2018 11:54)  POCT Blood Glucose.: 109 mg/dL (09 Mar 2018 07:42)    RADIOLOGY & ADDITIONAL TESTS:    Imaging Personally Reviewed:  [x ] YES  [ ] NO NATASHA DAI    HPI/Interval events:    89 y/o F from home with HHA  walks with walker w/ PMHx of HFpEF (GII DD), pulmonary hypertension, CKD,  asthma, anemia,  hypothyroid, HTN BIB EMS for SOB,  productive cough and fever.  Admission CXR and CT chest showed RUL infiltrate, RVP negative. Patient was initially treated with azithromycin and ceftriaxone for CAP.  Patient did not improve clinically, and 3/9 repeat CXR showed worsening RUL pneumonia, and patient was started on zosyn for hospital acquired pneumonia.     Intern on night float was called for oozing puncture sites.  Repeat Hgb 6.2.  DIC workup was negative with normal fibrinogen assay and normal platelets  PTT elevated to 76.4 and INR 1.33    BUN/Cr 112/2.53.  LDH elevated with haptoglobin and retic count pending.    CT abdomen/pelvis: moderate size left flank hematoma    Patient endorses left flank pain  Family at bedside to translate    REVIEW OF SYSTEMS:  CONSTITUTIONAL: + fatigue  EYES: No visual disturbances  ENMT:  No  throat pain  NECK: No pain o  RESPIRATORY: + cough with small amount blood  CARDIOVASCULAR: No chest pain  GASTROINTESTINAL: + L flank pain, + constipation  GENITOURINARY: No dysuria  SKIN: No  rashes  MUSCULOSKELETAL: No joint pain or swelling; + back pain      T(C): 36.2 (03-09-18 @ 20:35), Max: 36.4 (03-09-18 @ 04:51)  HR: 77 (03-10-18 @ 00:22) (69 - 81)  BP: 137/120 (03-10-18 @ 00:22) (100/26 - 166/60)  RR: 20 (03-10-18 @ 00:22) (18 - 20)  SpO2: 99% (03-09-18 @ 20:35) (98% - 100%)  Wt(kg): --Vital Signs Last 24 Hrs  T(C): 36.2 (09 Mar 2018 20:35), Max: 36.4 (09 Mar 2018 04:51)  T(F): 97.1 (09 Mar 2018 20:35), Max: 97.5 (09 Mar 2018 04:51)  HR: 77 (10 Mar 2018 00:22) (69 - 81)  BP: 137/120 (10 Mar 2018 00:22) (100/26 - 166/60)  BP(mean): --  RR: 20 (10 Mar 2018 00:22) (18 - 20)  SpO2: 99% (09 Mar 2018 20:35) (98% - 100%)    PHYSICAL EXAM:  GENERAL: mild distress from overall pain, well-groomed, well-developed  HEAD:  Atraumatic, Normocephalic  EYES: EOMI, conjunctiva and sclera clear  ENMT: dry mucous membranes, No lesions  NECK: Supple, No JVD  NERVOUS SYSTEM:  Alert; Motor Strength 5/5 B/L upper and lower extremities  CHEST/LUNG: decreased breath sounds on right, with ronchi  HEART: Regular rate and rhythm; No murmurs  ABDOMEN: Soft, distended, + firmness and tenderness left flank  EXTREMITIES: No clubbing, cyanosis, or edema  LYMPH: No lymphadenopathy noted  SKIN: No rashes     Consultant(s) Notes Reviewed:  [x ] YES  [ ] NO  Care Discussed with Consultants/Other Providers [ x] YES  [ ] NO    LABS:                        6.2    19.3  )-----------( 170      ( 09 Mar 2018 21:47 )             20.1     03-09    149<H>  |  112<H>  |  112<H>  ----------------------------<  193<H>  4.2   |  24  |  2.53<H>    Ca    8.4      09 Mar 2018 21:47  Phos  6.2     03-09  Mg     3.0     03-09      PT/INR - ( 09 Mar 2018 21:47 )   PT: 14.6 sec;   INR: 1.33 ratio         PTT - ( 09 Mar 2018 21:47 )  PTT:76.4 sec    CAPILLARY BLOOD GLUCOSE      POCT Blood Glucose.: 199 mg/dL (09 Mar 2018 19:47)  POCT Blood Glucose.: 147 mg/dL (09 Mar 2018 16:19)  POCT Blood Glucose.: 134 mg/dL (09 Mar 2018 11:54)  POCT Blood Glucose.: 109 mg/dL (09 Mar 2018 07:42)    RADIOLOGY & ADDITIONAL TESTS:    Imaging Personally Reviewed:  [x ] YES  [ ] NO

## 2018-03-10 NOTE — PROGRESS NOTE ADULT - SUBJECTIVE AND OBJECTIVE BOX
Problem List:  CKD STAGE 5  Right lung pneumonia with bronchitis,  Acute anemia due to retroperitonea and flank hematoma on CT.  Post Miranda cathetr placement  Transferred to the ICU due to deterioration in respiratory status  Poor appetite    Urine output 10 cc /hour 225 ml     PAST MEDICAL & SURGICAL HISTORY:  HTN (hypertension)  History of thyroid disorder  CHF (congestive heart failure)  No significant past surgical history      No Known Allergies      MEDICATIONS  (STANDING):  ALBUTerol/ipratropium for Nebulization 3 milliLiter(s) Nebulizer every 6 hours  folic acid 1 milliGRAM(s) Oral daily  insulin lispro (HumaLOG) corrective regimen sliding scale   SubCutaneous three times a day before meals  levothyroxine 50 MICROGram(s) Oral daily  nystatin Ointment 1 Application(s) Topical daily  piperacillin/tazobactam IVPB. 3.375 Gram(s) IV Intermittent every 12 hours  predniSONE   Tablet 40 milliGRAM(s) Oral daily  sevelamer hydrochloride 800 milliGRAM(s) Oral three times a day with meals    MEDICATIONS  (PRN):  guaiFENesin    Syrup 100 milliGRAM(s) Oral every 6 hours PRN Cough                            7.1    25.6  )-----------( 142      ( 10 Mar 2018 10:57 )             20.3     03-10    149<H>  |  113<H>  |  116<H>  ----------------------------<  151<H>  4.5   |  24  |  2.85<H>    Ca    7.8<L>      10 Mar 2018 10:57  Phos  6.0     03-10  Mg     2.7     03-10    TPro  5.5<L>  /  Alb  2.3<L>  /  TBili  0.6  /  DBili  x   /  AST  36  /  ALT  60  /  AlkPhos  80  03-10    PT/INR - ( 10 Mar 2018 10:57 )   PT: 15.1 sec;   INR: 1.38 ratio         PTT - ( 10 Mar 2018 10:57 )  PTT:40.1 sec    Sodium, Random Urine: 16 mmol/L (03-10 @ 00:50)  Osmolality, Random Urine: 424 mos/kg (03-10 @ 00:50)  Potassium, Random Urine: 16 mmol/L (03-10 @ 00:50)  Chloride, Random Urine: <10 mmol/L (03-10 @ 00:50)  Osmolality, Random Urine: 374 mos/kg (03-05 @ 21:44)  Sodium, Random Urine: 22 mmol/L (03-05 @ 21:44)  Potassium, Random Urine: 39 mmol/L (03-05 @ 21:44)  Creatinine, Random Urine: 99 mg/dL (03-05 @ 21:44)      REVIEW OF SYSTEMS:  acute illness        VITALS:  T(F): 96.4 (03-10-18 @ 14:00), Max: 97.2 (03-09-18 @ 15:40)  HR: 73 (03-10-18 @ 14:00)  BP: 134/42 (03-10-18 @ 14:00)  RR: 28 (03-10-18 @ 14:00)  SpO2: 97% (03-10-18 @ 14:00)  Wt(kg): --    03-09 @ 07:01  -  03-10 @ 07:00  --------------------------------------------------------  IN: 939 mL / OUT: 255 mL / NET: 684 mL    03-10 @ 06:01  -  03-10 @ 14:22  --------------------------------------------------------  IN: 0 mL / OUT: 40 mL / NET: -40 mL        Weight (kg): 65.4 (03-10 @ 01:30)  PHYSICAL EXAM:  Constitutional: well developed, no diaphoresis, no distress.  Neck: No JVD, no carotid bruit, supple, no adenopathy  Respiratory: ronchi  Cardiovascular: S1, S2, RRR, no pericardial rub, no murmur  Abdomen: BS+, soft, no tenderness, no bruit  Pelvis: bladder nondistended, left flank edema plus 1-2  Extremities: No cyanosis or clubbing. No peripheral edema.

## 2018-03-10 NOTE — PROGRESS NOTE ADULT - SUBJECTIVE AND OBJECTIVE BOX
Patient is a 89y old  Female who presents with a chief complaint of SOB,  productive cough and fever.      pt seen in icu [ x ], reg med floor [   ], bed [  ], chair at bedside [  x ], a+o x3 [ x ], lethargic [  ],  nad [ x ]    pt admitted to icu overnight for close monitoring 2nd to decreasing hemoglobin 2/2 left flank hematoma, possibly 2/2 heparin injections with extension from abdomen to left flank in context of uremia and coagulopathy.   Allergies    No Known Allergies        Vitals    T(F): 94.7 (03-10-18 @ 06:00), Max: 97.2 (03-09-18 @ 15:40)  HR: 74 (03-10-18 @ 06:00) (68 - 81)  BP: 111/47 (03-10-18 @ 06:00) (73/55 - 146/122)  RR: 22 (03-10-18 @ 06:00) (16 - 27)  SpO2: 92% (03-10-18 @ 06:00) (92% - 100%)  Wt(kg): --  CAPILLARY BLOOD GLUCOSE      POCT Blood Glucose.: 189 mg/dL (10 Mar 2018 07:09)      Labs                          6.2    19.3  )-----------( 170      ( 09 Mar 2018 21:47 )             20.1       03-09    149<H>  |  112<H>  |  112<H>  ----------------------------<  193<H>  4.2   |  24  |  2.53<H>    Ca    8.4      09 Mar 2018 21:47  Phos  6.2     03-09  Mg     3.0     03-09              .Sputum Sputum  03-08 @ 17:01   Normal Respiratory Priya present  --    Rare polymorphonuclear leukocytes per low power field  No Squamous epithelial cells per low power field  Few Gram Positive Cocci in Clusters per oil power field      .Blood Blood  03-04 @ 21:44   No growth at 5 days.  --  --          Radiology Results    < from: CT Chest No Cont (03.09.18 @ 23:56) >  Large left flank hematoma with retroperitoneal hematoma seen anterior to   the left psoas muscle.    Multifocal pneumonia appearing minimally worsened from the prior CT.   Right pleural effusion.    Distended gallbladder with gallstones. No april evidence of acute   cholecystitis.    < end of copied text >        < from: CT Abdomen and Pelvis No Cont (03.09.18 @ 23:57) >  INTERPRETATION:  PRELIMINARY REPORT:    Large left flank hematoma with retroperitoneal hematoma seen anterior to   the left psoas muscle.    Multifocal pneumonia appearing minimally worsened from the prior CT.   Right pleural effusion.    Distended gallbladder with gallstones. No april evidence of acute   cholecystitis.    < end of copied text >        Meds    MEDICATIONS  (STANDING):  ALBUTerol/ipratropium for Nebulization 3 milliLiter(s) Nebulizer every 6 hours  folic acid 1 milliGRAM(s) Oral daily  insulin lispro (HumaLOG) corrective regimen sliding scale   SubCutaneous three times a day before meals  levothyroxine 50 MICROGram(s) Oral daily  nystatin Ointment 1 Application(s) Topical daily  phenylephrine    Infusion 0.1 MICROgram(s)/kG/Min (1.226 mL/Hr) IV Continuous <Continuous>  piperacillin/tazobactam IVPB. 3.375 Gram(s) IV Intermittent every 12 hours  predniSONE   Tablet 40 milliGRAM(s) Oral daily  sevelamer hydrochloride 800 milliGRAM(s) Oral three times a day with meals      MEDICATIONS  (PRN):  guaiFENesin    Syrup 100 milliGRAM(s) Oral every 6 hours PRN Cough      Physical Exam    Neuro :  no focal deficits  Respiratory: right ronchi  CV: RRR, S1S2, no murmurs,   Abdominal: Soft, NT, ND +BS,  Extremities: No edema, + peripheral pulses    ASSESSMENT    Pneumonia  anemia  left flank hematoma  acute on ckd  electrolyte imbalance  afib  h/o HTN (hypertension)  asthma  CHFpEF (congestive heart failure)  hypothyroid  No significant past surgical history      PLAN    cont tele  echo with EF = 55 to 60%, Grade II diastolic dysfunction. Moderate-severe pulmonary hypertension.  cardio f/u  aspirin and lovenox d/c'd  rept cxr with worsening pna noted  rept ct chest abd pelv with Large left flank hematoma with retroperitoneal hematoma seen anterior to the left psoas muscle and Multifocal pneumonia appearing minimally worsened. right pleural eff   id f/u noted  cont zosyn  cont atrov and prov nebs,   cont supplemental oxygen,   pulm f/u   taper steroids  urine legionella neg but serum legionella antibody positive noted   bld cx neg noted above  renal f/u  pt transfused 2unit prbc, 2units ffp, 1unit plt  check stool occult blood  cont current meds  mgmt as per icu Patient is a 89y old  Female who presents with a chief complaint of SOB,  productive cough and fever.      pt seen in icu [ x ], reg med floor [   ], bed [  ], chair at bedside [  x ], awake and responsive [ x ], lethargic [  ],  nad [ x ]    jonas to bsd [x]    pt admitted to icu overnight for close monitoring 2nd to decreasing hemoglobin 2/2 left flank hematoma, possibly 2/2 heparin injections with extension from abdomen to left flank in context of uremia and coagulopathy.   Allergies    No Known Allergies        Vitals    T(F): 94.7 (03-10-18 @ 06:00), Max: 97.2 (03-09-18 @ 15:40)  HR: 74 (03-10-18 @ 06:00) (68 - 81)  BP: 111/47 (03-10-18 @ 06:00) (73/55 - 146/122)  RR: 22 (03-10-18 @ 06:00) (16 - 27)  SpO2: 92% (03-10-18 @ 06:00) (92% - 100%)  Wt(kg): --  CAPILLARY BLOOD GLUCOSE      POCT Blood Glucose.: 189 mg/dL (10 Mar 2018 07:09)      Labs                          6.2    19.3  )-----------( 170      ( 09 Mar 2018 21:47 )             20.1       03-09    149<H>  |  112<H>  |  112<H>  ----------------------------<  193<H>  4.2   |  24  |  2.53<H>    Ca    8.4      09 Mar 2018 21:47  Phos  6.2     03-09  Mg     3.0     03-09              .Sputum Sputum  03-08 @ 17:01   Normal Respiratory Priya present  --    Rare polymorphonuclear leukocytes per low power field  No Squamous epithelial cells per low power field  Few Gram Positive Cocci in Clusters per oil power field      .Blood Blood  03-04 @ 21:44   No growth at 5 days.  --  --          Radiology Results    < from: CT Chest No Cont (03.09.18 @ 23:56) >  Large left flank hematoma with retroperitoneal hematoma seen anterior to   the left psoas muscle.    Multifocal pneumonia appearing minimally worsened from the prior CT.   Right pleural effusion.    Distended gallbladder with gallstones. No april evidence of acute   cholecystitis.    < end of copied text >        < from: CT Abdomen and Pelvis No Cont (03.09.18 @ 23:57) >  INTERPRETATION:  PRELIMINARY REPORT:    Large left flank hematoma with retroperitoneal hematoma seen anterior to   the left psoas muscle.    Multifocal pneumonia appearing minimally worsened from the prior CT.   Right pleural effusion.    Distended gallbladder with gallstones. No april evidence of acute   cholecystitis.    < end of copied text >        Meds    MEDICATIONS  (STANDING):  ALBUTerol/ipratropium for Nebulization 3 milliLiter(s) Nebulizer every 6 hours  folic acid 1 milliGRAM(s) Oral daily  insulin lispro (HumaLOG) corrective regimen sliding scale   SubCutaneous three times a day before meals  levothyroxine 50 MICROGram(s) Oral daily  nystatin Ointment 1 Application(s) Topical daily  phenylephrine    Infusion 0.1 MICROgram(s)/kG/Min (1.226 mL/Hr) IV Continuous <Continuous>  piperacillin/tazobactam IVPB. 3.375 Gram(s) IV Intermittent every 12 hours  predniSONE   Tablet 40 milliGRAM(s) Oral daily  sevelamer hydrochloride 800 milliGRAM(s) Oral three times a day with meals      MEDICATIONS  (PRN):  guaiFENesin    Syrup 100 milliGRAM(s) Oral every 6 hours PRN Cough      Physical Exam    Neuro :  no focal deficits  Respiratory: right ronchi  CV: RRR, S1S2, no murmurs,   Abdominal: Soft, NT, ND +BS, multiple abd hematomas  Extremities: No edema, + peripheral pulses    ASSESSMENT    Pneumonia  anemia  left flank hematoma  acute on ckd  electrolyte imbalance  afib  h/o HTN (hypertension)  asthma  CHFpEF (congestive heart failure)  hypothyroid  No significant past surgical history      PLAN    cont tele  echo with EF = 55 to 60%, Grade II diastolic dysfunction. Moderate-severe pulmonary hypertension.  cardio f/u  aspirin and lovenox d/c'd  rept cxr with worsening pna noted  rept ct chest abd pelv with Large left flank hematoma with retroperitoneal hematoma seen anterior to the left psoas muscle and Multifocal pneumonia appearing minimally worsened. right pleural eff   id f/u noted  cont zosyn  cont atrov and prov nebs,   cont supplemental oxygen,   pulm f/u   taper steroids  urine legionella neg but serum legionella antibody positive noted   bld cx neg noted above  renal f/u  pt transfused 2unit prbc, 2units ffp, 1unit plt  check stool occult blood  cont current meds  mgmt as per icu

## 2018-03-10 NOTE — CONSULT NOTE ADULT - PROBLEM SELECTOR RECOMMENDATION 2
2/4 SIRS criteria met - leukocytosis (albeit on steroids) and hypothermia (rectal temp 95)  did not respond to azithromycin and ceftriaxone  antibiotics changed to zosyn yesterday for hospital acquired pneumonia  urine legionella antigen negative (although can have high false neg rate), + serum legionella Ab not indicative of active infection  - vancomycin 1 gm IV x 1 given tonight  - levaquin 500mg IV x 1 given to cover for possible legionella PNA (less likely but no contraindications to fluoroquinolone)  - c/w renal dose zosyn  - above discussed with Dr Guadarrama  - solumedrol changed to po prednisone (no wheezing)  - will dc lantus BID as steroids are tapered, poor appetite and A1C 6, to avoid hypoglycemia. will continue with HSS while on steroids  - c/w duoneb  - d/w family at bedside - they are clear that they do not want mechanical ventilation for patient 2/4 SIRS criteria met - leukocytosis (albeit on steroids) and hypothermia (rectal temp 95)  did not respond to azithromycin and ceftriaxone  antibiotics changed to zosyn yesterday for hospital acquired pneumonia  urine legionella antigen negative (although can have high false neg rate), + serum legionella Ab not necessarily indicative of active infection  - vancomycin 1 gm IV x 1 given tonight to cover for possible MRSA   - levaquin 500mg IV x 1 given to cover for possible legionella PNA (less likely but no contraindications to fluoroquinolone)  - c/w renal dose zosyn  - above discussed with Dr Guadarrama  - solumedrol changed to po prednisone (no wheezing)  - will dc lantus BID as steroids are tapered, patient has poor appetite and A1C 6, in order to avoid hypoglycemia; will continue with HSS while on steroids  - c/w duoneb  - d/w family at bedside - they are clear that they do not want mechanical ventilation for patient

## 2018-03-10 NOTE — PROVIDER CONTACT NOTE (OTHER) - SITUATION
Patient due to receive blood transfusion, rectal temperature reveals temperature of 95.3. Unable to get oral or axillary temperature.

## 2018-03-10 NOTE — PROGRESS NOTE ADULT - SUBJECTIVE AND OBJECTIVE BOX
INTERVAL HPI/OVERNIGHT EVENTS: brought to icu, has still been stable from vital signs    PRESSORS: [] YES [x] NO  WHICH:    ANTIBIOTICS: zosyn                 DATE STARTED: 3/10  ANTIBIOTICS: vanc x1 dose                 DATE STARTED: 3/10  ANTIBIOTICS:                  DATE STARTED:    Antimicrobial:  piperacillin/tazobactam IVPB. 3.375 Gram(s) IV Intermittent every 12 hours    Cardiovascular:  phenylephrine    Infusion 0.1 MICROgram(s)/kG/Min IV Continuous <Continuous>    Pulmonary:  ALBUTerol/ipratropium for Nebulization 3 milliLiter(s) Nebulizer every 6 hours  guaiFENesin    Syrup 100 milliGRAM(s) Oral every 6 hours PRN    Hematalogic:    Other:  folic acid 1 milliGRAM(s) Oral daily  insulin lispro (HumaLOG) corrective regimen sliding scale   SubCutaneous three times a day before meals  levothyroxine 50 MICROGram(s) Oral daily  nystatin Ointment 1 Application(s) Topical daily  predniSONE   Tablet 40 milliGRAM(s) Oral daily  sevelamer hydrochloride 800 milliGRAM(s) Oral three times a day with meals    ALBUTerol/ipratropium for Nebulization 3 milliLiter(s) Nebulizer every 6 hours  folic acid 1 milliGRAM(s) Oral daily  guaiFENesin    Syrup 100 milliGRAM(s) Oral every 6 hours PRN  insulin lispro (HumaLOG) corrective regimen sliding scale   SubCutaneous three times a day before meals  levothyroxine 50 MICROGram(s) Oral daily  nystatin Ointment 1 Application(s) Topical daily  phenylephrine    Infusion 0.1 MICROgram(s)/kG/Min IV Continuous <Continuous>  piperacillin/tazobactam IVPB. 3.375 Gram(s) IV Intermittent every 12 hours  predniSONE   Tablet 40 milliGRAM(s) Oral daily  sevelamer hydrochloride 800 milliGRAM(s) Oral three times a day with meals    Drug Dosing Weight    Weight (kg): 65.4 (10 Mar 2018 01:30)    CENTRAL LINE: [] YES [x] NO  LOCATION:   DATE INSERTED:  REMOVE: [] YES [] NO  EXPLAIN:    CASTILLO: [] YES [x] NO    DATE INSERTED:  REMOVE:  [] YES [] NO  EXPLAIN:    A-LINE:  [] YES [x] NO  LOCATION:   DATE INSERTED:  REMOVE:  [] YES [] NO  EXPLAIN:    PMH -reviewed admission note, no change since admission  PAST MEDICAL & SURGICAL HISTORY:  HTN (hypertension)  History of thyroid disorder  CHF (congestive heart failure)  No significant past surgical history      ICU Vital Signs Last 24 Hrs  T(C): 33.8 (10 Mar 2018 01:30), Max: 36.2 (09 Mar 2018 07:49)  T(F): 92.8 (10 Mar 2018 01:30), Max: 97.2 (09 Mar 2018 15:40)  HR: 76 (10 Mar 2018 03:30) (68 - 81)  BP: 110/50 (10 Mar 2018 03:30) (91/52 - 146/122)  BP(mean): 65 (10 Mar 2018 03:30) (51 - 65)  ABP: --  ABP(mean): --  RR: 20 (10 Mar 2018 03:30) (16 - 20)  SpO2: 96% (10 Mar 2018 03:30) (95% - 100%)            03-08 @ 07:01  -  03-09 @ 07:00  --------------------------------------------------------  IN: 850 mL / OUT: 250 mL / NET: 600 mL            PHYSICAL EXAM:    GENERAL: [x]NAD, [x]well-groomed, [x]well-developed  HEAD:  [x]Atraumatic, [x]Normocephalic  EYES: [x]EOMI, [x]PERRLA, []conjunctiva and sclera clear  ENMT: [x]No tonsillar erythema, exudates, or enlargement; []Moist mucous membranes, []Good dentition, []No lesions  NECK: [x]Supple, normal appearance, [x]No JVD; []Normal thyroid; []Trachea midline  NERVOUS SYSTEM:  [x]Alert & Oriented X3, []Good concentration; []Motor Strength 5/5 B/L upper and lower extremities; []DTRs 2+ intact and symmetric  CHEST/LUNG: [x]No chest deformity; []Normal percussion bilaterally; []No rales, rhonchi, wheezing   HEART: [x]Regular rate and rhythm; [x]No murmurs, rubs, or gallops  ABDOMEN: [x]Soft, Nontender, Nondistended; [x]Bowel sounds present, [x]hematomas noted on abd wall from heparin  EXTREMITIES:  [x]2+ Peripheral Pulses, []No clubbing, cyanosis, or edema  LYMPH: []No lymphadenopathy noted  SKIN: []No rashes or lesions; []Good capillary refill      LABS:  CBC Full  -  ( 09 Mar 2018 21:47 )  WBC Count : 19.3 K/uL  Hemoglobin : 6.2 g/dL  Hematocrit : 20.1 %  Platelet Count - Automated : 170 K/uL  Mean Cell Volume : 95.3 fl  Mean Cell Hemoglobin : 29.3 pg  Mean Cell Hemoglobin Concentration : 30.8 gm/dL  Auto Neutrophil # : x  Auto Lymphocyte # : x  Auto Monocyte # : x  Auto Eosinophil # : x  Auto Basophil # : x  Auto Neutrophil % : x  Auto Lymphocyte % : x  Auto Monocyte % : x  Auto Eosinophil % : x  Auto Basophil % : x    03-09    149<H>  |  112<H>  |  112<H>  ----------------------------<  193<H>  4.2   |  24  |  2.53<H>    Ca    8.4      09 Mar 2018 21:47  Phos  6.2     03-09  Mg     3.0     03-09      PT/INR - ( 09 Mar 2018 21:47 )   PT: 14.6 sec;   INR: 1.33 ratio         PTT - ( 09 Mar 2018 21:47 )  PTT:76.4 sec    Culture Results:   Normal Respiratory Priya present (03-08 @ 17:01)      RADIOLOGY & ADDITIONAL STUDIES REVIEWED:  ***    [x]GOALS OF CARE DISCUSSION WITH PATIENT/FAMILY/PROXY: dnr/dni    CRITICAL CARE TIME SPENT: 35 minutes

## 2018-03-10 NOTE — CONSULT NOTE ADULT - ASSESSMENT
89 y/o F from home with HHA  walks with walker w/ PMHx of HFpEF (GII DD), pulmonary hypertension, CKD,  asthma, anemia,  hypothyroid, HTN BIB EMS for SOB,  productive cough and fever 2/2 RUL 89 y/o F from home with HHA  walks with walker w/ PMHx of HFpEF (GII DD), pulmonary hypertension, CKD,  asthma, anemia,  hypothyroid, HTN BIB EMS for SOB,  productive cough and fever 2/2 RUL pneumonia, not responsive to initial antibiotic treatment.  Now presents with sepsis 2/2 RUL pneumonia, uremia and flank hematoma

## 2018-03-10 NOTE — PROVIDER CONTACT NOTE (CRITICAL VALUE NOTIFICATION) - BACKGROUND
Pt admitted for pneumonia, c/o L flank pain, constipation. R/o GI bleed prior to fleet enema, labs drawn.

## 2018-03-10 NOTE — PROVIDER CONTACT NOTE (OTHER) - BACKGROUND
Pt admitted for pneumonia, worsening. Now with dropping H/H, last H/H 6.2/20.1. Now due to receive blood transfusion. s/p CT scan of abd/pelvis to rule out retroperitoneal bleed.

## 2018-03-10 NOTE — PROGRESS NOTE ADULT - ASSESSMENT
89 y/o F from home with HHA  walks with walker w/ PMHx of CHF unknown EF, CKD ( as per daughter one of her kidneys is not very functional), asthma, anemia,  hypothyroid, and multifocal pneumonia, now lt flank/retroperintoneal hematoma..  1.ICU monitoring.  2.Transfuse PRBC, f/u H/H.  3.Afib-asa on hold.  4.Hypothyroidism-synthroid.  5.ABX.  6.GI and DVT prophylaxis.

## 2018-03-10 NOTE — PROGRESS NOTE ADULT - SUBJECTIVE AND OBJECTIVE BOX
CHIEF COMPLAINT:Patient is a 89y old  Female who presents with a chief complaint of sob. Pt transferred to ICU for acute anemia, ct shows large flank/retroperitoneal hematoma.  	  REVIEW OF SYSTEMS:  [ x] Unable to obtain    PHYSICAL EXAM:  T(C): 35.6 (03-10-18 @ 10:00), Max: 36.2 (03-09-18 @ 15:40)  HR: 74 (03-10-18 @ 10:00) (67 - 81)  BP: 116/55 (03-10-18 @ 10:00) (73/55 - 146/122)  RR: 34 (03-10-18 @ 10:00) (16 - 34)  SpO2: 95% (03-10-18 @ 10:00) (92% - 100%)  Wt(kg): --  I&O's Summary    09 Mar 2018 07:01  -  10 Mar 2018 07:00  --------------------------------------------------------  IN: 939 mL / OUT: 255 mL / NET: 684 mL    10 Mar 2018 06:01  -  10 Mar 2018 11:25  --------------------------------------------------------  IN: 0 mL / OUT: 40 mL / NET: -40 mL        Appearance: Normal	  HEENT:   Normal oral mucosa, PERRL, EOMI	  Lymphatic: No lymphadenopathy  Cardiovascular: Normal S1 S2,  Respiratory: B/L ronchi  Gastrointestinal:  Soft, Non-tender, + BS	  Skin: No rashes, No ecchymoses, No cyanosis	  Extremities: Normal range of motion, No clubbing, cyanosis or edema  Vascular: Peripheral pulses palpable 2+ bilaterally    MEDICATIONS  (STANDING):  ALBUTerol/ipratropium for Nebulization 3 milliLiter(s) Nebulizer every 6 hours  folic acid 1 milliGRAM(s) Oral daily  insulin lispro (HumaLOG) corrective regimen sliding scale   SubCutaneous three times a day before meals  levothyroxine 50 MICROGram(s) Oral daily  nystatin Ointment 1 Application(s) Topical daily  phenylephrine    Infusion 0.1 MICROgram(s)/kG/Min (1.226 mL/Hr) IV Continuous <Continuous>  piperacillin/tazobactam IVPB. 3.375 Gram(s) IV Intermittent every 12 hours  predniSONE   Tablet 40 milliGRAM(s) Oral daily  sevelamer hydrochloride 800 milliGRAM(s) Oral three times a day with meals        	  LABS:	 	                       6.2    19.3  )-----------( 170      ( 09 Mar 2018 21:47 )             20.1     03-10    149<H>  |  113<H>  |  116<H>  ----------------------------<  151<H>  4.5   |  24  |  x     Ca    7.8<L>      10 Mar 2018 10:57  Phos  6.2     03-09  Mg     2.7     03-10    TPro  x   /  Alb  2.3<L>  /  TBili  x   /  DBili  x   /  AST  x   /  ALT  x   /  AlkPhos  x   03-10    proBNP: Serum Pro-Brain Natriuretic Peptide: 8534 pg/mL (03-04 @ 12:05)    Lipid Profile: Cholesterol 67  LDL 27  HDL 23  TG 84    HgA1c: Hemoglobin A1C, Whole Blood: 6.0 % (03-04 @ 21:20)    TSH: Thyroid Stimulating Hormone, Serum: 2.12 uU/mL (03-04 @ 18:20)      EXAM:  CT CHEST                            PROCEDURE DATE:  03/09/2018    ******PRELIMINARY REPORT******    ******PRELIMINARY REPORT******              INTERPRETATION:  PRELIMINARY REPORT:    Large left flank hematoma with retroperitoneal hematoma seen anterior to   the left psoas muscle.    Multifocal pneumonia appearing minimally worsened from the prior CT.   Right pleural effusion.    Distended gallbladder with gallstones. No april evidence of acute   cholecystitis.    Please follow up the official dictated report in the morning.

## 2018-03-10 NOTE — CONSULT NOTE ADULT - PROBLEM SELECTOR RECOMMENDATION 9
patient with dropping hemoglobin 2/2 left flank hematoma, possibly 2/2 heparin injections with extension from abdomen to left flank in context of uremia and coagulopathy.  Elevated PTT likely 2/2 coagulation factor consumption 2/2 hematoma.  DIC unlikely given normal fibrinogen assay and platelets.  Patient is hemodynamically stable but will transfer to ICU for closer monitoring while patient receives multiple blood products  - start with 2U FFP for now   - DDAVP IVPB @ 0.3ug/kg - 20ug IVPB ordered  - 2U PRBC  - received ASA this morning.  will give 1U platelets as well  - follow up repeat coags and CBC  - ASA dc'ed patient with dropping hemoglobin 2/2 left flank hematoma, possibly 2/2 heparin injections with extension from abdomen to left flank in context of uremia and coagulopathy.  Elevated PTT likely 2/2 consumption of coag factors.  DIC unlikely given normal fibrinogen assay and platelets.  Patient is hemodynamically stable but will transfer to ICU for closer monitoring while patient receives multiple blood products  - 2U FFP   - DDAVP IVPB @ 0.3ug/kg - 20ug IVPB ordered  - 2U PRBC  - received ASA this morning.  will give 1U platelets  - follow up repeat coags and CBC  - ASA dc'ed

## 2018-03-10 NOTE — PROGRESS NOTE ADULT - ASSESSMENT
89 y/o F from home with HHA  walks with walker w/ PMHx of HFpEF (GII DD), pulmonary hypertension, CKD,  asthma, anemia,  hypothyroid, HTN BIB EMS for SOB,  productive cough and fever 2/2 RUL pneumonia, not responsive to initial antibiotic treatment.  Now presents with sepsis 2/2 RUL pneumonia, uremia and flank hematoma

## 2018-03-10 NOTE — PROGRESS NOTE ADULT - SUBJECTIVE AND OBJECTIVE BOX
Patient is a 89y old  Female who presents with a chief complaint of sob and wheezing.   Awake, alert, comfortable in NAD. Transferred to ICU because of retroperitoneal bleed. Still with some sob despite oxygen supp via NC  INTERVAL HPI/OVERNIGHT EVENTS:      VITAL SIGNS:  T(F): 96.4 (03-10-18 @ 14:00)  HR: 73 (03-10-18 @ 14:00)  BP: 134/42 (03-10-18 @ 14:00)  RR: 28 (03-10-18 @ 14:00)  SpO2: 97% (03-10-18 @ 14:00)  Wt(kg): --  I&O's Detail    09 Mar 2018 07:01  -  10 Mar 2018 07:00  --------------------------------------------------------  IN:    FFP (Fresh Frozen Plasma): 350 mL    Packed Red Blood Cells: 350 mL    Platelets - Single Donor: 239 mL  Total IN: 939 mL    OUT:    Indwelling Catheter - Urethral: 255 mL  Total OUT: 255 mL    Total NET: 684 mL      10 Mar 2018 06:01  -  10 Mar 2018 14:23  --------------------------------------------------------  IN:  Total IN: 0 mL    OUT:    Indwelling Catheter - Urethral: 40 mL  Total OUT: 40 mL    Total NET: -40 mL              REVIEW OF SYSTEMS:    CONSTITUTIONAL:  No fevers, chills, sweats    HEENT:  Eyes:  No diplopia or blurred vision. ENT:  No earache, sore throat or runny nose.    CARDIOVASCULAR:  No pressure, squeezing, tightness, or heaviness about the chest; no palpitations.    RESPIRATORY:  Per HPI    GASTROINTESTINAL:  No abdominal pain, nausea, vomiting or diarrhea.    GENITOURINARY:  No dysuria, frequency or urgency.    NEUROLOGIC:  No paresthesias, fasciculations, seizures or weakness.    PSYCHIATRIC:  No disorder of thought or mood.      PHYSICAL EXAM:    Constitutional: Well developed and nourished  Eyes:Perrla  ENMT: normal  Neck:supple  Respiratory: good air entry  Cardiovascular: S1 S2 regular  Gastrointestinal: Soft, Non tender  Extremities: No edema  Vascular:normal  Neurological:Awake, alert,Ox3  Musculoskeletal:Normal      MEDICATIONS  (STANDING):  ALBUTerol/ipratropium for Nebulization 3 milliLiter(s) Nebulizer every 6 hours  folic acid 1 milliGRAM(s) Oral daily  insulin lispro (HumaLOG) corrective regimen sliding scale   SubCutaneous three times a day before meals  levothyroxine 50 MICROGram(s) Oral daily  nystatin Ointment 1 Application(s) Topical daily  piperacillin/tazobactam IVPB. 3.375 Gram(s) IV Intermittent every 12 hours  predniSONE   Tablet 40 milliGRAM(s) Oral daily  sevelamer hydrochloride 800 milliGRAM(s) Oral three times a day with meals    MEDICATIONS  (PRN):  guaiFENesin    Syrup 100 milliGRAM(s) Oral every 6 hours PRN Cough      Allergies    No Known Allergies    Intolerances        LABS:                        7.1    25.6  )-----------( 142      ( 10 Mar 2018 10:57 )             20.3     03-10    149<H>  |  113<H>  |  116<H>  ----------------------------<  151<H>  4.5   |  24  |  2.85<H>    Ca    7.8<L>      10 Mar 2018 10:57  Phos  6.0     03-10  Mg     2.7     03-10    TPro  5.5<L>  /  Alb  2.3<L>  /  TBili  0.6  /  DBili  x   /  AST  36  /  ALT  60  /  AlkPhos  80  03-10    PT/INR - ( 10 Mar 2018 10:57 )   PT: 15.1 sec;   INR: 1.38 ratio         PTT - ( 10 Mar 2018 10:57 )  PTT:40.1 sec          CAPILLARY BLOOD GLUCOSE      POCT Blood Glucose.: 149 mg/dL (10 Mar 2018 11:32)  POCT Blood Glucose.: 189 mg/dL (10 Mar 2018 07:09)  POCT Blood Glucose.: 199 mg/dL (09 Mar 2018 19:47)  POCT Blood Glucose.: 147 mg/dL (09 Mar 2018 16:19)    pro-bnp -- 03-09 @ 14:38     d-dimer 1020  03-09 @ 14:38  pro-bnp 8534 03-04 @ 12:05     d-dimer --  03-04 @ 12:05      RADIOLOGY & ADDITIONAL TESTS:    CXR:    Ct scan chest:  < from: CT Abdomen and Pelvis No Cont (03.09.18 @ 23:57) >  INTERPRETATION:  PRELIMINARY REPORT:    Large left flank hematoma with retroperitoneal hematoma seen anterior to   the left psoas muscle.    Multifocal pneumonia appearing minimally worsened from the prior CT.   Right pleural effusion.    Distended gallbladder with gallstones. No april evidence of acute   cholecystitis.    Please follow up the official dictated report in the morning.     < end of copied text >    ekg;    echo:

## 2018-03-10 NOTE — CONSULT NOTE ADULT - PROBLEM SELECTOR RECOMMENDATION 3
Cr Cl 10 with uremia  - c/w abena  - epogen dc'ed in context of active bleed Cr Cl 10 with uremia  - c/w abena  - epogen dc'ed in context of active bleed  - Dr Moore following

## 2018-03-10 NOTE — PROGRESS NOTE ADULT - ASSESSMENT
CKD stage 5.    Deterioration in respiratory status due to pneumonia, antibiotics changed.    Acute bleed flank and retroperitoneal post blood transfusion and FFP.  Received Lasix  with the above transfusions.  Transfuse PRBC as need.    CKD with MAGALI and oliguria.  Follow intake and output

## 2018-03-10 NOTE — PROGRESS NOTE ADULT - PROBLEM SELECTOR PLAN 1
patient with dropping hemoglobin 2/2 left flank hematoma, possibly 2/2 heparin injections with extension from abdomen to left flank in context of uremia and coagulopathy.  Elevated PTT likely 2/2 consumption of coag factors.  DIC unlikely given normal fibrinogen assay and platelets.  Patient is hemodynamically stable but will transfer to ICU for closer monitoring while patient receives multiple blood products  - 2U FFP   - DDAVP IVPB @ 0.3ug/kg - 20ug IVPB ordered  - 2U PRBC  - 1U Platelets  - received ASA this morning.  will give 1U platelets  - follow up repeat coags and CBC  - ASA dc'ed.

## 2018-03-10 NOTE — CONSULT NOTE ADULT - ATTENDING COMMENTS
89 year old female PMHx of Diastolic CHF, CKD,  asthma, anemia,  hypothyroid, HTN  admitted for PNA Right side, large. now ICU consult called for drop in h/h and flank hematoma.    pt c/o sob  labs and radiology reviewed noted worsening PNA and new hematoma  + hypothermia at time    assessment  Hypovolumia due Flank hematoma/Acute blood loss anemia  MAGALI On CKD with uremia making bleeding worse  Doubt DIC  Large Right sided PNA, but pt appears to be well compensated respiratory wise at this time    Plan  Admit to ICU  DDAVP  transfuse FFP, Plt, PRBC  trend CBC  IV ABX  Miranda as ? retention on CT  supportive care  d/w Family prognosis guarded. will follow.

## 2018-03-10 NOTE — PROGRESS NOTE ADULT - PROBLEM SELECTOR PLAN 2
did not respond to azithromycin and ceftriaxone  antibiotics changed to zosyn yesterday for hospital acquired pneumonia  urine legionella antigen negative (although can have high false neg rate), + serum legionella Ab not necessarily indicative of active infection  - vancomycin 1 gm IV x 1 given tonight to cover for possible MRSA   - levaquin 500mg IV x 1 given to cover for possible legionella PNA (less likely but no contraindications to fluoroquinolone)  - c/w renal dose zosyn  - above discussed with Dr Guadarrama  - solumedrol changed to po prednisone (no wheezing)  - will dc lantus BID as steroids are tapered, patient has poor appetite and A1C 6, in order to avoid hypoglycemia; will continue with HSS while on steroids  - c/w duoneb  - d/w family at bedside - they are clear that they do not want mechanical ventilation for patient.

## 2018-03-11 NOTE — PROGRESS NOTE ADULT - SUBJECTIVE AND OBJECTIVE BOX
Meds:  Zosyn 3.375 gm IVPB q 12 hours.(discontinued per family request).    Allergies:  Allergies    No Known Allergies    Intolerances      ROS  [ x ] UNABLE TO ELICIT    General:  [  ] None  [  ] Fever  [  ] Chills  [  ] Malaise    Skin:  [  ] None [  ] Rash  [  ] Wound  [  ] Ulcer    HEENT:  [  ] None  [  ] Sore Throat  [  ] Nasal congestion/ runny nose  [  ] Photophobia [  ] Neck pain      Chest:  [  ] None   [  ] SOB  [ x ] Cough  [  ] None    Cardiovascular:   [  ] None  [  ] CP  [  ] Palpitation    Gastrointestinal:  [  ] None  [  ] Abd pain   [  ] Nausea    [  ] Vomiting   [  ] Diarrhea	     Genitourinary:  [  ] None [  ] Polyuria   [  ] Urgency  [  ] Frequency  [  ] Dysuria    [  ]  Hematuria       Musculoskeletal:  [  ] None [  ] Back Pain	[  ] Body aches  [  ] Joint pain [  ] legs edema    Neurological: [  ] None [  ]Dizziness  [  ]Visual Disturbance  [  ]Headaches   [  ] Weakness          PHYSICAL EXAM:  Vital Signs Last 24 Hrs  T(C): 34.8 (10 Mar 2018 06:00), Max: 36.2 (09 Mar 2018 07:49)  T(F): 94.7 (10 Mar 2018 06:00), Max: 97.2 (09 Mar 2018 15:40)  HR: 74 (10 Mar 2018 06:00) (68 - 81)  BP: 111/47 (10 Mar 2018 06:00) (73/55 - 146/122)  BP(mean): 64 (10 Mar 2018 06:00) (51 - 65)  RR: 22 (10 Mar 2018 06:00) (16 - 27)  SpO2: 92% (10 Mar 2018 06:00) (92% - 100%)    Constitutional:    HEENT: [ x ] Wnl  [  ] Pharyngeal congestion    Neck:  [  ] Supple  [  ]Lymphadenopathy  [  ] No JVD   [  ] JVD  [  ] Masses   [  ] WNL    CHEST/Respiratory:  [  ]Clear to auscultation  [ x ] Rales   [  ] Rhonchi   [ x ] Wheezing     [  ] Chest Tenderness      Cardiovascular:  [ x ] Reg S1 S2   [  ] Irreg S1 S2   [ x ]No Murmur  [  ] +ve Murmurs  [  ]Systolic [  ]Diastolic      Abdomen:  [ x ] Soft  [ x ] No tendrerness  [  ] Tenderness  [  ] Organomegaly  [  ] ABD Distention  [  ] Rigidity                       [ x ] No Rigidity [ x ] Ecchymosis and swelling left lower abdomen.                       [ x ] No Rebound Tenderness  [  ] No Guarding Rigidity  [  ] Rebound Tenderness[  ] Guarding Rigidity                          [ x ]  +ve Bowel Sounds  [  ] Decreased Bowel Sounds    [  ] Absent Bowel Sounds                            Extremities: [  ] No edema [ x ] Edema  [  ] Clubbing   [  ] Cyanosis                         [ x ] No Tender Calf muscles  [  ] Tender Calf muscles                        [ x ] Palpable peripheral pulses    Neurological: [ x ] Awake  [ x ] Alert  [  ] Oriented  x                             [  ] Confused  [ x ] Drowsy  [  ] respond to painful stimuli  [  ] Unresponsive    Skin:  [ x ] Intact [  ] Redness [  ] Thrombophlebitis  [  ] Rashes  [  ] Dry  [  ] Ulcers    Ortho:  [  ] Joint Swelling  [  ] Joint erythema [  ] Joint tenderness                [  ] Increased temp. to touch  [  ] DJD [ x ] WNL          LABS/DIAGNOSTIC TESTS                        7.2    24.6  )-----------( 100      ( 11 Mar 2018 00:20 )             20.5   03-10    149<H>  |  113<H>  |  116<H>  ----------------------------<  151<H>  4.5   |  24  |  2.85<H>    Ca    7.8<L>      10 Mar 2018 10:57  Phos  6.0     03-10  Mg     2.7     03-10    TPro  5.5<L>  /  Alb  2.3<L>  /  TBili  0.6  /  DBili  x   /  AST  36  /  ALT  60  /  AlkPhos  80  03-10      D-Dimer Assay, Quantitative: 1020: D-Dimer result less than 230 ng/mL DDU correlates with the absence of  thrombosis in a patient with a low and moderate       pre-test probability of thrombosis.  1 DDU is approximately equal to  2 ng/mL FEU (previous units). ng/mL DDU (03.09.18 @ 14:38)    Fibrin Split Products: >=5 <20: The fibrin/fibrinogen degradation product manual agglutination assay is  not adequately sensitive for evaluation of deep vein thrombosis and/or  pulmonary embolism. (03.09.18 @ 22:12)    Fibrinogen Assay: 492: Effective March 21st, the reference range for fibrinogen has changed. mg/dL (03.09.18 @ 22:12)              CULTURES:   Culture - Sputum . (03.08.18 @ 17:01)    Gram Stain:   Rare polymorphonuclear leukocytes per low power field  No Squamous epithelial cells per low power field  Few Gram Positive Cocci in Clusters per oil power field    Specimen Source: .Sputum Sputum      Culture - Blood (03.04.18 @ 21:44)    Specimen Source: .Blood Blood    Culture Results:   No growth to date.    Culture - Blood (03.04.18 @ 21:44)    Specimen Source: .Blood Blood    Culture Results:   No growth to date.        RADIOLOGY:    EXAM:  CT CHEST                            PROCEDURE DATE:  03/09/2018          INTERPRETATION:  CT of the Chest without contrast, CT of the abdomen   without contrast and CT of the pelvis without contrast    HISTORY: Pneumonia and flank hematoma    Comparison: 3/7/2018    Technique: Multi-slice volumetric acquisition using 1.25 mm collimation.    Intravenous contrast was not administered.     INTERPRETATION: The lung windows demonstrate similar right upper lobe   infiltrate with progression of infiltrate at this time in the right lower   lobe.    The mediastinum shows similar heart size with no definite evidence of   adenopathy.      Small right pleural effusion is again identified.      The trachea and proximal bronchi show no focal lesions .      The bony structures show no gross destructive changes.      Below the hemidiaphragms, renal atrophy with small cysts bilaterally are   again noted.    There is a large hematoma of the left flank musculature with extension   Gerota's fascia and along the left psoas muscle towards the left pelvis.    Survey of the bowel reveals no evidence of bowel obstruction or free air.    Large calcified gallstones are seen in the gallbladder.    There is no free fluid in the pelvic cul-de-sac.    IMPRESSION: Progression of right lower lobe pneumonia. Large left   flank/retroperitoneal hematoma.    The above findings correspond to a report provided by the offsite   overnight image reading service at the time of the examination.    Thank you for this referral            Assessment and Recommendation:   89 y/o F from home with HHA  walks with walker w/ PMHx of CHF unknown EF, CKD ( as per daughter one of her kidneys is not very functional), asthma, anemia,  hypothyroid, and HTN BIB EMS for SOB,  productive cough and fever.  Patient was admitted for pneumonia and CHF and was started on IV Rocephin.  CT chest suggested pneumonia.  3/9/18 Repeat CT chest and abdomen showed large left flank hematoma, and the H&H dropped, and patient was transferred to icu for close monitoring. Also ABX were changed to IV Zosyn and 1 gm of Vancomycin was given.    Problem/Recommendation - 1:  Problem: Pneumonia.   Recommendation:   1- Continue IV Zosyn if family agrees.  2- procalcitonin level if family change their mind for the goal of care.  3- O2 as needed.  4- Pulmonary management.  5- Comfort care as per family wishes.    Problem/Recommendation - 2:  ·  Problem: COPD exacerbation.    Recommendation:   1- Bronchodilators.  2- O2 as needed.  3- Pulmonary management.    Problem/Recommendation - 3:  ·  Problem: Anemia.    Recommendation:   1- Closely monitor H & H.  2- Observe for bleeding.   3- discontinue ASA.    Problem/Recommendation - 4:  ·  Problem: CHF (congestive heart failure).    Recommendation:   1- Monitor Blood pressure closely.  2- Blood pressure control.  3- Comfort care as per family wishes.     Discussed with medical resident.

## 2018-03-11 NOTE — PROGRESS NOTE ADULT - PROBLEM SELECTOR PLAN 2
did not respond to azithromycin and ceftriaxone  antibiotics changed to zosyn yesterday for hospital acquired pneumonia  - vancomycin 1 gm IV x 1 given tonight to cover for possible MRSA   - levaquin 500mg IV x 1 given to cover for possible legionella PNA (less likely but no contraindications to fluoroquinolone)  - c/w renal dose zosyn  - above discussed with Dr Guadarrama  - will dc lantus BID as steroids are tapered, patient has poor appetite and A1C 6, in order to avoid hypoglycemia; will continue with HSS while on steroids  Pt was desaturating on Nasal canula. refused Bipap  family wants comfort care.  call Palliative in AM patient with dropping hemoglobin 2/2 left flank hematoma, possibly 2/2 heparin injections with extension from abdomen to left flank in context of uremia and coagulopathy.  Elevated PTT likely 2/2 consumption of coag factors.  DIC unlikely given normal fibrinogen assay and platelets.    S/p 2U FFP, 2 PRBC and 1 Platelets   - DDAVP IVPB @ 0.3ug/kg - 20ug IVPB ordered  - follow up repeat coags and CBC  - ASA dc'ed.

## 2018-03-11 NOTE — PROGRESS NOTE ADULT - PROBLEM SELECTOR PLAN 1
patient with dropping hemoglobin 2/2 left flank hematoma, possibly 2/2 heparin injections with extension from abdomen to left flank in context of uremia and coagulopathy.  Elevated PTT likely 2/2 consumption of coag factors.  DIC unlikely given normal fibrinogen assay and platelets.    S/p 2U FFP, 2 PRBC and 1 Platelets   - DDAVP IVPB @ 0.3ug/kg - 20ug IVPB ordered  - follow up repeat coags and CBC  - ASA dc'ed. Patient was admitted for acute hypoxic respiratory failure Patient was admitted for acute hypoxic respiratory failure  likely sec to pna   Patient was started on antibiotics  Patient became lethargic and hypoxic overnight for which , she would have been possibly intubated . However it was her wish as per family to be DNR/ DNI  Family now has refused any aggressive medical interventions and only want comfort care  Started on morphine linnea now  Palliative team and social work consulted for hospice  No blood draws , no antibiotics

## 2018-03-11 NOTE — PROGRESS NOTE ADULT - ASSESSMENT
89 y/o F from home with HHA  walks with walker w/ PMHx of CHF unknown EF, CKD ( as per daughter one of her kidneys is not very functional), asthma, anemia,  hypothyroid, and multifocal pneumonia, now lt flank/retroperintoneal hematoma..  1.Family requesting supportive care.  2.Palliative eval.

## 2018-03-11 NOTE — PROGRESS NOTE ADULT - SUBJECTIVE AND OBJECTIVE BOX
Patient is a 89y old  Female who presents with a chief complaint of SOB,  productive cough and fever.      pt seen in icu [ x ], reg med floor [   ], bed [  ], chair at bedside [  x ], awake and responsive [ x ], lethargic [  ],  nad [ x ]    jonas to bsd [x]      pt desat overnight and started on bipap.    pt dnr/dni        Allergies    No Known Allergies        Vitals    T(F): 95.6 (03-11-18 @ 04:00), Max: 97.1 (03-10-18 @ 12:30)  HR: 69 (03-11-18 @ 07:00) (67 - 80)  BP: 115/38 (03-11-18 @ 07:00) (100/48 - 148/47)  RR: 29 (03-11-18 @ 07:00) (16 - 34)  SpO2: 90% (03-11-18 @ 07:00) (89% - 100%)  Wt(kg): --  CAPILLARY BLOOD GLUCOSE      POCT Blood Glucose.: 161 mg/dL (10 Mar 2018 17:37)      Labs                          7.2    24.6  )-----------( 100      ( 11 Mar 2018 00:20 )             20.5       03-10    149<H>  |  113<H>  |  116<H>  ----------------------------<  151<H>  4.5   |  24  |  2.85<H>    Ca    7.8<L>      10 Mar 2018 10:57  Phos  6.0     03-10  Mg     2.7     03-10    TPro  5.5<L>  /  Alb  2.3<L>  /  TBili  0.6  /  DBili  x   /  AST  36  /  ALT  60  /  AlkPhos  80  03-10            .Sputum Sputum  03-08 @ 17:01   Normal Respiratory Priya present  --    Rare polymorphonuclear leukocytes per low power field  No Squamous epithelial cells per low power field  Few Gram Positive Cocci in Clusters per oil power field      .Blood Blood  03-04 @ 21:44   No growth at 5 days.  --  --          Radiology Results          Meds    MEDICATIONS  (STANDING):  ALBUTerol/ipratropium for Nebulization 3 milliLiter(s) Nebulizer every 6 hours  chlorhexidine 2% Cloths 1 Application(s) Topical daily  folic acid 1 milliGRAM(s) Oral daily  insulin lispro (HumaLOG) corrective regimen sliding scale   SubCutaneous three times a day before meals  levothyroxine Injectable 25 MICROGram(s) IV Push at bedtime  nystatin Ointment 1 Application(s) Topical daily  piperacillin/tazobactam IVPB. 3.375 Gram(s) IV Intermittent every 12 hours  sevelamer hydrochloride 800 milliGRAM(s) Oral three times a day with meals      MEDICATIONS  (PRN):  guaiFENesin    Syrup 100 milliGRAM(s) Oral every 6 hours PRN Cough      Physical Exam      Neuro :  no focal deficits  Respiratory: right ronchi  CV: RRR, S1S2, no murmurs,   Abdominal: Soft, NT, ND +BS, multiple abd hematomas  Extremities: No edema, + peripheral pulses    ASSESSMENT    Pneumonia  anemia  left flank hematoma  acute on ckd  electrolyte imbalance  afib  h/o HTN (hypertension)  asthma  CHFpEF (congestive heart failure)  hypothyroid  No significant past surgical history      PLAN    cont tele  echo with EF = 55 to 60%, Grade II diastolic dysfunction. Moderate-severe pulmonary hypertension.  cardio f/u  aspirin and lovenox d/c'd  rept cxr with worsening pna noted  rept ct chest abd pelv with Large left flank hematoma with retroperitoneal hematoma seen anterior to the left psoas muscle and Multifocal pneumonia appearing minimally worsened. right pleural eff   id f/u noted  cont zosyn  cont atrov and prov nebs,   cont supplemental oxygen,   pulm f/u   taper steroids  cont bipap as needed  urine legionella neg but serum legionella antibody positive noted   bld cx neg noted above  renal f/u  pt transfused 2unit prbc, 2units ffp, 1unit plt  check stool occult blood  cont current meds  mgmt as per icu

## 2018-03-11 NOTE — CHART NOTE - NSCHARTNOTEFT_GEN_A_CORE
Patient was lethargic and desaturating around midnight. Attempts to escalate the oxygen delivery did not help. Patient was eventually placed on BiPaP. Daughter was at bedside, discussed goals of care again. She didn't want her mother to suffer and said, 'let her go'. DNR/DNI was signed.   Saturation improved to 91% on BiPaP. Patient is tachypneic but otherwise looks comfortable. Will give prn morphine for respiratory distress if condition worsens.   Attending made aware. Patient was lethargic and desaturating around midnight. Attempts to escalate the oxygen delivery did not help. Patient was eventually placed on BiPaP. Daughter was at bedside, discussed goals of care again. She didn't want her mother to suffer and said, 'let her go'. DNR/DNI was signed.   Saturation improved to 91% on BiPaP. Patient is tachypneic but otherwise looks comfortable. Will give prn morphine for respiratory distress if condition worsens.   Please call palliative am.   Attending made aware.

## 2018-03-11 NOTE — PROGRESS NOTE ADULT - ASSESSMENT
89 y/o F from home with HHA  walks with walker w/ PMHx of HFpEF (GII DD), pulmonary hypertension, CKD,  asthma, anemia,  hypothyroid, HTN BIB EMS for SOB,  productive cough and fever 2/2 RUL pneumonia, not responsive to initial antibiotic treatment.  Now presents with sepsis 2/2 RUL pneumonia, uremia and flank hematoma 89 y/o F from home with HHA  walks with walker w/ PMHx of HFpEF (GII DD), pulmonary hypertension, CKD,  asthma, anemia,  hypothyroid, HTN BIB EMS for SOB,  productive cough and fever 2/2 RUL pneumonia, not responsive to initial antibiotic treatment.  Now presents with sepsis 2/2 RUL pneumonia, uremia and flank hematoma.

## 2018-03-11 NOTE — PROGRESS NOTE ADULT - ASSESSMENT
CKD stage 5 Renal function stable.    Deterioration in respiratory status due to pneumonia, antibiotics changed.    Acute bleed flank and retroperitoneal post blood transfusion and FFP.  Received Lasix  with the above transfusions.  Transfuse PRBC as need.    CKD with MAGALI due to acute bleed to follow.  Follow intake and output

## 2018-03-11 NOTE — PROGRESS NOTE ADULT - SUBJECTIVE AND OBJECTIVE BOX
Patient is a 89y old  Female who presents with a chief complaint of sob and wheezing  Lethargic, poorly arousable, lying in bed in NAD. Maybe a candidate for palliative care  INTERVAL HPI/OVERNIGHT EVENTS:      VITAL SIGNS:  T(F): 95.6 (03-11-18 @ 04:00)  HR: 65 (03-11-18 @ 12:00)  BP: 54/20 (03-11-18 @ 12:00)  RR: 16 (03-11-18 @ 12:00)  SpO2: 96% (03-11-18 @ 12:00)  Wt(kg): --  I&O's Detail    10 Mar 2018 06:01  -  11 Mar 2018 07:00  --------------------------------------------------------  IN:    IV PiggyBack: 175 mL    Packed Red Blood Cells: 350 mL  Total IN: 525 mL    OUT:    Indwelling Catheter - Urethral: 585 mL  Total OUT: 585 mL    Total NET: -60 mL              REVIEW OF SYSTEMS:    CONSTITUTIONAL:  No fevers, chills, sweats    HEENT:  Eyes:  No diplopia or blurred vision. ENT:  No earache, sore throat or runny nose.    CARDIOVASCULAR:  No pressure, squeezing, tightness, or heaviness about the chest; no palpitations.    RESPIRATORY:  Per HPI    GASTROINTESTINAL:  No abdominal pain, nausea, vomiting or diarrhea.    GENITOURINARY:  No dysuria, frequency or urgency.    NEUROLOGIC:  No paresthesias, fasciculations, seizures or weakness.    PSYCHIATRIC:  No disorder of thought or mood.      PHYSICAL EXAM:    Constitutional: Well developed and nourished  Eyes:Perrla  ENMT: normal  Neck:supple  Respiratory: good air entry  Cardiovascular: S1 S2 regular  Gastrointestinal: Soft, Non tender  Extremities: No edema  Vascular:normal  Neurological:lethargic  Musculoskeletal:Normal      MEDICATIONS  (STANDING):  glycopyrrolate Injectable 0.2 milliGRAM(s) IV Push every 6 hours  HYDROmorphone  Injectable 0.5 milliGRAM(s) IV Push every 6 hours    MEDICATIONS  (PRN):  HYDROmorphone  Injectable 0.5 milliGRAM(s) IV Push every 2 hours PRN DYSPNEA  LORazepam   Injectable 1 milliGRAM(s) IV Push every 6 hours PRN Agitation      Allergies    No Known Allergies    Intolerances        LABS:                        7.2    24.6  )-----------( 100      ( 11 Mar 2018 00:20 )             20.5     03-10    149<H>  |  113<H>  |  116<H>  ----------------------------<  151<H>  4.5   |  24  |  2.85<H>    Ca    7.8<L>      10 Mar 2018 10:57  Phos  6.0     03-10  Mg     2.7     03-10    TPro  5.5<L>  /  Alb  2.3<L>  /  TBili  0.6  /  DBili  x   /  AST  36  /  ALT  60  /  AlkPhos  80  03-10    PT/INR - ( 10 Mar 2018 10:57 )   PT: 15.1 sec;   INR: 1.38 ratio         PTT - ( 10 Mar 2018 10:57 )  PTT:40.1 sec          CAPILLARY BLOOD GLUCOSE      POCT Blood Glucose.: 161 mg/dL (10 Mar 2018 17:37)    pro-bnp -- 03-09 @ 14:38     d-dimer 1020  03-09 @ 14:38      RADIOLOGY & ADDITIONAL TESTS:  < from: Xray Abdomen 1 View PORTABLE -Urgent (03.09.18 @ 16:15) >    INTERPRETATION:  Abdominal x-ray    Indication: Clinical suspicion for small bowel obstruction.    Single supine AP view of the abdomen is acquiredwithout a previous   examination for comparison.    Impression: No evidence for bowel obstruction.    Moderate amount of stool in the right colon.    No gross evidence for organomegaly.      < end of copied text >    CXR:    Ct scan chest:    ekg;    echo:

## 2018-03-11 NOTE — PROGRESS NOTE ADULT - PROVIDER SPECIALTY LIST ADULT
Cardiology
Critical Care
Critical Care
Infectious Disease
Internal Medicine
Nephrology
Pulmonology
Infectious Disease
Infectious Disease
Internal Medicine
Pulmonology

## 2018-03-11 NOTE — PROGRESS NOTE ADULT - PROBLEM SELECTOR PROBLEM 3
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

## 2018-03-11 NOTE — PROGRESS NOTE ADULT - SUBJECTIVE AND OBJECTIVE BOX
INTERVAL HPI/OVERNIGHT EVENTS: Patient was lethargic and desaturating around midnight. Attempts to escalate the oxygen delivery did not help. Patient was eventually placed on BiPaP. Daughter was at bedside, discussed goals of care again. She didn't want her mother to suffer and said, 'let her go'. DNR/DNI was signed.   Saturation improved to 91% on BiPaP. Patient is tachypneic but otherwise looks comfortable. Will give prn morphine for respiratory distress if condition worsens.     PRESSORS: [ ] YES [x] NO    ANTIBIOTICS: Zosyn                 DATE STARTED: 3/10    Antimicrobial:  piperacillin/tazobactam IVPB. 3.375 Gram(s) IV Intermittent every 12 hours    Pulmonary:  ALBUTerol/ipratropium for Nebulization 3 milliLiter(s) Nebulizer every 6 hours  guaiFENesin    Syrup 100 milliGRAM(s) Oral every 6 hours PRN    Other:  chlorhexidine 2% Cloths 1 Application(s) Topical daily  folic acid 1 milliGRAM(s) Oral daily  insulin lispro (HumaLOG) corrective regimen sliding scale   SubCutaneous three times a day before meals  levothyroxine 50 MICROGram(s) Oral daily  nystatin Ointment 1 Application(s) Topical daily  predniSONE   Tablet 40 milliGRAM(s) Oral daily  sevelamer hydrochloride 800 milliGRAM(s) Oral three times a day with meals    ALBUTerol/ipratropium for Nebulization 3 milliLiter(s) Nebulizer every 6 hours  chlorhexidine 2% Cloths 1 Application(s) Topical daily  folic acid 1 milliGRAM(s) Oral daily  guaiFENesin    Syrup 100 milliGRAM(s) Oral every 6 hours PRN  insulin lispro (HumaLOG) corrective regimen sliding scale   SubCutaneous three times a day before meals  levothyroxine 50 MICROGram(s) Oral daily  nystatin Ointment 1 Application(s) Topical daily  piperacillin/tazobactam IVPB. 3.375 Gram(s) IV Intermittent every 12 hours  predniSONE   Tablet 40 milliGRAM(s) Oral daily  sevelamer hydrochloride 800 milliGRAM(s) Oral three times a day with meals    Drug Dosing Weight    Weight (kg): 65.4 (10 Mar 2018 01:30)    CENTRAL LINE: [ ] YES [x] NO  LOCATION:   DATE INSERTED:    CASTILLO: [x] YES [ ] NO    DATE INSERTED:    A-LINE:  [ ] YES [x] NO  LOCATION:   DATE INSERTED:    ICU Vital Signs Last 24 Hrs  T(C): 35.7 (10 Mar 2018 23:58), Max: 36.2 (10 Mar 2018 12:30)  T(F): 96.2 (10 Mar 2018 23:58), Max: 97.1 (10 Mar 2018 12:30)  HR: 76 (11 Mar 2018 01:00) (67 - 80)  BP: 130/54 (11 Mar 2018 01:00) (73/55 - 148/47)  BP(mean): 74 (11 Mar 2018 01:00) (57 - 109)  ABP: --  ABP(mean): --  RR: 24 (11 Mar 2018 01:00) (16 - 34)  SpO2: 92% (11 Mar 2018 01:00) (89% - 100%)            03-09 @ 07:01  -  03-10 @ 07:00  --------------------------------------------------------  IN: 989 mL / OUT: 255 mL / NET: 734 mL            PHYSICAL EXAM:    GENERAL: [x]NAD, [x]well-groomed, [x]well-developed  HEAD:  [x]Atraumatic, [x]Normocephalic  EYES: [x]EOMI, [x]PERRLA, []conjunctiva and sclera clear  ENMT: [x]No tonsillar erythema, exudates, or enlargement; []Moist mucous membranes, []Good dentition, []No lesions  NECK: [x]Supple, normal appearance, [x]No JVD; []Normal thyroid; []Trachea midline  NERVOUS SYSTEM:  [x]Alert & Oriented X3, []Good concentration; []Motor Strength 5/5 B/L upper and lower extremities; []DTRs 2+ intact and symmetric  CHEST/LUNG: [x]No chest deformity; []Normal percussion bilaterally; []No rales, rhonchi, wheezing   HEART: [x]Regular rate and rhythm; [x]No murmurs, rubs, or gallops  ABDOMEN: [x]Soft, Nontender, Nondistended; [x]Bowel sounds present, [x]hematomas noted on abd wall from heparin  EXTREMITIES:  [x]2+ Peripheral Pulses, []No clubbing, cyanosis, or edema  LYMPH: []No lymphadenopathy noted  SKIN: []No rashes or lesions; []Good capillary refill    LABS:  CBC Full  -  ( 11 Mar 2018 00:20 )  WBC Count : 24.6 K/uL  Hemoglobin : 7.2 g/dL  Hematocrit : 20.5 %  Platelet Count - Automated : 100 K/uL  Mean Cell Volume : 88.6 fl  Mean Cell Hemoglobin : 31.0 pg  Mean Cell Hemoglobin Concentration : 34.9 gm/dL  Auto Neutrophil # : x  Auto Lymphocyte # : x  Auto Monocyte # : x  Auto Eosinophil # : x  Auto Basophil # : x  Auto Neutrophil % : x  Auto Lymphocyte % : x  Auto Monocyte % : x  Auto Eosinophil % : x  Auto Basophil % : x    03-10    149<H>  |  113<H>  |  116<H>  ----------------------------<  151<H>  4.5   |  24  |  2.85<H>    Ca    7.8<L>      10 Mar 2018 10:57  Phos  6.0     03-10  Mg     2.7     03-10    TPro  5.5<L>  /  Alb  2.3<L>  /  TBili  0.6  /  DBili  x   /  AST  36  /  ALT  60  /  AlkPhos  80  03-10    PT/INR - ( 10 Mar 2018 10:57 )   PT: 15.1 sec;   INR: 1.38 ratio         PTT - ( 10 Mar 2018 10:57 )  PTT:40.1 sec    Culture Results:   Normal Respiratory Priya present (03-08 @ 17:01)    [ ]GOALS OF CARE DISCUSSION WITH PATIENT/FAMILY/PROXY: DNR, DNI    CRITICAL CARE TIME SPENT: 35 minutes

## 2018-03-11 NOTE — PROGRESS NOTE ADULT - SUBJECTIVE AND OBJECTIVE BOX
CHIEF COMPLAINT:Patient is a 89y old  Female who presents with a chief complaint of sob. pt appears comfortable.  	  REVIEW OF SYSTEMS:    [ x] Unable to obtain    PHYSICAL EXAM:  T(C): 35.3 (03-11-18 @ 04:00), Max: 36.2 (03-10-18 @ 12:30)  HR: 61 (03-11-18 @ 09:00) (61 - 80)  BP: 93/25 (03-11-18 @ 09:00) (78/30 - 148/47)  RR: 15 (03-11-18 @ 09:00) (15 - 34)  SpO2: 88% (03-11-18 @ 09:00) (88% - 100%)  Wt(kg): --  I&O's Summary    10 Mar 2018 06:01  -  11 Mar 2018 07:00  --------------------------------------------------------  IN: 525 mL / OUT: 585 mL / NET: -60 mL        Appearance: Normal	  HEENT:   Normal oral mucosa, PERRL, EOMI	  Lymphatic: No lymphadenopathy  Cardiovascular: Normal S1 S2, No JVD, No murmurs, No edema  Respiratory: B/L ronchi  Gastrointestinal:  Soft, Non-tender, + BS	  Skin: No rashes, No ecchymoses, No cyanosis	  Extremities: Normal range of motion, No clubbing, cyanosis or edema  Vascular: Peripheral pulses palpable 2+ bilaterally    MEDICATIONS  (STANDING):  glycopyrrolate Injectable 0.2 milliGRAM(s) IV Push every 6 hours  HYDROmorphone  Injectable 0.5 milliGRAM(s) IV Push every 6 hours        	  LABS:	 	                      7.2    24.6  )-----------( 100      ( 11 Mar 2018 00:20 )             20.5     03-10    149<H>  |  113<H>  |  116<H>  ----------------------------<  151<H>  4.5   |  24  |  2.85<H>    Ca    7.8<L>      10 Mar 2018 10:57  Phos  6.0     03-10  Mg     2.7     03-10    TPro  5.5<L>  /  Alb  2.3<L>  /  TBili  0.6  /  DBili  x   /  AST  36  /  ALT  60  /  AlkPhos  80  03-10    proBNP: Serum Pro-Brain Natriuretic Peptide: 8534 pg/mL (03-04 @ 12:05)    Lipid Profile: Cholesterol 67  LDL 27  HDL 23  TG 84    HgA1c: Hemoglobin A1C, Whole Blood: 6.0 % (03-04 @ 21:20)    TSH: Thyroid Stimulating Hormone, Serum: 2.12 uU/mL (03-04 @ 18:20)

## 2018-03-11 NOTE — PROGRESS NOTE ADULT - PROBLEM SELECTOR PLAN 3
Mary Grace c/sam kraft did not respond to azithromycin and ceftriaxone  antibiotics changed to zosyn yesterday for hospital acquired pneumonia  - vancomycin 1 gm IV x 1 given tonight to cover for possible MRSA   - levaquin 500mg IV x 1 given to cover for possible legionella PNA (less likely but no contraindications to fluoroquinolone)  - c/w renal dose zosyn  - above discussed with Dr Guadarrama  - will dc lantus BID as steroids are tapered, patient has poor appetite and A1C 6, in order to avoid hypoglycemia; will continue with HSS while on steroids  Pt was desaturating on Nasal canula. refused Bipap  family wants comfort care.  call Palliative in AM

## 2018-03-11 NOTE — PROGRESS NOTE ADULT - SUBJECTIVE AND OBJECTIVE BOX
Problem List:  CKD STAGE 5  Right lung pneumonia with bronchitis,  Acute bleed - anemia due to retroperitoneal  and flank hematoma seen on  CT.  Post Miranda cathetr placement  Transferred to the ICU due to deterioration in respiratory status and acute anemia.    Poor appetite associated with current illness.    Urine output last 24 hours 585 ml.      PAST MEDICAL & SURGICAL HISTORY:  HTN (hypertension)  History of thyroid disorder  CHF (congestive heart failure)  No significant past surgical history      No Known Allergies      MEDICATIONS  (STANDING):  glycopyrrolate Injectable 0.2 milliGRAM(s) IV Push every 6 hours  HYDROmorphone  Injectable 0.5 milliGRAM(s) IV Push every 6 hours    MEDICATIONS  (PRN):  HYDROmorphone  Injectable 0.5 milliGRAM(s) IV Push every 2 hours PRN DYSPNEA  LORazepam   Injectable 1 milliGRAM(s) IV Push every 6 hours PRN Agitation                            7.2    24.6  )-----------( 100      ( 11 Mar 2018 00:20 )             20.5     03-10    149<H>  |  113<H>  |  116<H>  ----------------------------<  151<H>  4.5   |  24  |  2.85<H>    Ca    7.8<L>      10 Mar 2018 10:57  Phos  6.0     03-10  Mg     2.7     03-10    TPro  5.5<L>  /  Alb  2.3<L>  /  TBili  0.6  /  DBili  x   /  AST  36  /  ALT  60  /  AlkPhos  80  03-10    PT/INR - ( 10 Mar 2018 10:57 )   PT: 15.1 sec;   INR: 1.38 ratio         PTT - ( 10 Mar 2018 10:57 )  PTT:40.1 sec    Sodium, Random Urine: 16 mmol/L (03-10 @ 00:50)  Osmolality, Random Urine: 424 mos/kg (03-10 @ 00:50)  Potassium, Random Urine: 16 mmol/L (03-10 @ 00:50)  Chloride, Random Urine: <10 mmol/L (03-10 @ 00:50)  Osmolality, Random Urine: 374 mos/kg (03-05 @ 21:44)  Sodium, Random Urine: 22 mmol/L (03-05 @ 21:44)  Potassium, Random Urine: 39 mmol/L (03-05 @ 21:44)  Creatinine, Random Urine: 99 mg/dL (03-05 @ 21:44)      REVIEW OF SYSTEMS:  un able to obtain   Acute illness.        VITALS:  T(F): 95.6 (03-11-18 @ 04:00), Max: 97.1 (03-10-18 @ 12:30)  HR: 61 (03-11-18 @ 09:00)  BP: 93/25 (03-11-18 @ 09:00)  RR: 20 (03-11-18 @ 09:00)  SpO2: 88% (03-11-18 @ 09:00)  Wt(kg): --    03-10 @ 06:01  -  03-11 @ 07:00  --------------------------------------------------------  IN: 525 mL / OUT: 585 mL / NET: -60 mL        PHYSICAL EXAM:  Constitutional: appears ill.  Neck: No JVD.  Respiratory: air entrance with no wheezing and ronchi  Cardiovascular: S1, S2, RRR, no pericardial rub, no murmur  Abdomen: BS+, soft, no tenderness, no bruit  Pelvis: bladder nondistended  Extremities: No edema, swelling in left flank area.

## 2018-03-11 NOTE — PROGRESS NOTE ADULT - PROBLEM SELECTOR PLAN 9
RISK                                                          Points  [  ] Previous VTE                                                3  [  ] Thrombophilia                                             2  [  ] Lower limb paralysis                                   2        (unable to hold up >15 seconds)    [  ] Current Cancer                                             2         (within 6 months)  [ x ] Immobilization > 24 hrs                              1  [  ] ICU/CCU stay > 24 hours                             1  [ x ] Age > 60                                                         1    IMPROVE VTE Score: 2  heaprin for VTE prophylaxis.  VTE score= 2 for age and immobilization     heparin SQ
DVT PPX
DVT PPX
RISK                                                          Points  [  ] Previous VTE                                                3  [  ] Thrombophilia                                             2  [  ] Lower limb paralysis                                   2        (unable to hold up >15 seconds)    [  ] Current Cancer                                             2         (within 6 months)  [ x ] Immobilization > 24 hrs                              1  [  ] ICU/CCU stay > 24 hours                             1  [ x ] Age > 60                                                         1    IMPROVE VTE Score: 2  heaprin for VTE prophylaxis.  VTE score= 2 for age and immobilization     heparin SQ

## 2018-03-11 NOTE — DISCHARGE NOTE FOR THE EXPIRED PATIENT - HOSPITAL COURSE
HPI/Interval events:    89 y/o F from home with HHA  walks with walker w/ PMHx of HFpEF (GII DD), pulmonary hypertension, CKD,  asthma, anemia,  hypothyroid, HTN BIB EMS for SOB,  productive cough and fever.  Admission CXR and CT chest showed RUL infiltrate, RVP negative. Patient was initially treated with azithromycin and ceftriaxone for CAP.  Patient did not improve clinically, and 3/9 repeat CXR showed worsening RUL pneumonia, and patient was started on zosyn for hospital acquired pneumonia.     Intern on night float was called for oozing puncture sites.  Repeat Hgb 6.2.  DIC workup was negative with normal fibrinogen assay and normal platelets  PTT elevated to 76.4 and INR 1.33    BUN/Cr 112/2.53.  LDH elevated with haptoglobin and retic count pending.    CT abdomen/pelvis: moderate size left flank hematoma    Patient was admitted to ICU for sepsis 2/2 RUL pneumonia, uremia and flank hematoma.    Patient was started on iv antibiotics ,broad spectrum , zosyn for sepsis. Sepsis also led to coagulopathy. Hb dropped in setting of sepsis , coagulopathy , hematoma . Multiple blood products were administered.  DDAVP was given*1 .  However patient's condition was fragile due to multiple co-morbidities.  Patient was lethargic and desaturating around midnight. Attempts to escalate the oxygen delivery did not help. Patient was eventually placed on BiPaP. Daughter was at bedside, discussed goals of care again. She didn't want her mother to suffer and said, 'let her go'. DNR/DNI was signed.   Saturation improved to 91% on BiPaP.  However later BIPAP was discontinued later as HPI/Interval events:    89 y/o F from home with HHA  walks with walker w/ PMHx of HFpEF (GII DD), pulmonary hypertension, CKD,  asthma, anemia,  hypothyroid, HTN BIB EMS for SOB,  productive cough and fever.  Admission CXR and CT chest showed RUL infiltrate, RVP negative. Patient was initially treated with azithromycin and ceftriaxone for CAP.  Patient did not improve clinically, and 3/9 repeat CXR showed worsening RUL pneumonia, and patient was started on zosyn for hospital acquired pneumonia.     Intern on night float was called for oozing puncture sites.  Repeat Hgb 6.2.  DIC workup was negative with normal fibrinogen assay and normal platelets  PTT elevated to 76.4 and INR 1.33    BUN/Cr 112/2.53.  LDH elevated with haptoglobin and retic count pending.    CT abdomen/pelvis: moderate size left flank hematoma    Patient was admitted to ICU for sepsis 2/2 RUL pneumonia, uremia and flank hematoma.    Patient was started on iv antibiotics ,broad spectrum , zosyn for sepsis. Sepsis also led to coagulopathy. Hb dropped in setting of sepsis , coagulopathy , hematoma . Multiple blood products were administered.  DDAVP was given*1 .  However patient's condition was fragile due to multiple co-morbidities.  Patient was lethargic and desaturating around midnight. Attempts to escalate the oxygen delivery did not help. Patient was eventually placed on BiPaP. Daughter was at bedside, discussed goals of care again. She didn't want her mother to suffer and said, 'let her go'. DNR/DNI was signed.   Saturation improved to 91% on BiPaP.  However later BIPAP was discontinued later as family only wanted comfort measures as per patient's wishes.  Started on morphine linnea now  Palliative team and social work consulted for hospice  No blood draws , no antibiotics.    Called to see patient for unresponsivesness . On examination , patient was not responding to verbal or physical stimuli . Absent heart and breath sounds . Absent peripheral pulses . Pupils fixed and dilated . Patient was pronouned dead at 2.26 pm . Attending  was informed . Next of kin infomed . Autopsy denied . Organ donation number to be taken.

## 2018-03-11 NOTE — PROGRESS NOTE ADULT - ATTENDING COMMENTS
89 female with hx of CHFpEF, CKD, asthma, hypothyroidism, HTN, admitted with multifocal pneumonia, transferred to the ICU due to anemia and spontaneous flank and psoas hematoma.  Currently hemodynamically stable.  Last night patient developed worsening hypoxemia and respiratory failure, family declined any further aggressive interventions, patient is now DNR/DNI, refusing bipap as well.  I spoke with daughter and grandson at bedside, they are interested in inpatient hospice.   Plan:  - d/c all unnecessary meds and labs  - dilaudid PRN for dyspnea  - PRN ativan for anxiety  - robinul around the clock  - palliative/hospice eval
89 female with hx of CHFpEF, CKD, asthma, hypothyroidism, HTN, admitted with multifocal pneumonia, transferred to the ICU due to anemia and spontaneous flank and psoas hematoma.  Currently hemodynamically stable.  Plan:  - transfuse PRBCs  - zosyn for pneumonia  - may need lasix after transfusion

## 2018-06-07 NOTE — ED ADULT NURSE NOTE - FALL HARM RISK CONCLUSION
Tetracycline Counseling: Patient counseled regarding possible photosensitivity and increased risk for sunburn.  Patient instructed to avoid sunlight, if possible.  When exposed to sunlight, patients should wear protective clothing, sunglasses, and sunscreen.  The patient was instructed to call the office immediately if the following severe adverse effects occur:  hearing changes, easy bruising/bleeding, severe headache, or vision changes.  The patient verbalized understanding of the proper use and possible adverse effects of tetracycline.  All of the patient's questions and concerns were addressed. Patient understands to avoid pregnancy while on therapy due to potential birth defects. Minocycline Pregnancy And Lactation Text: This medication is Pregnancy Category D and not consider safe during pregnancy. It is also excreted in breast milk. Detail Level: Zone Tazorac Counseling:  Patient advised that medication is irritating and drying.  Patient may need to apply sparingly and wash off after an hour before eventually leaving it on overnight.  The patient verbalized understanding of the proper use and possible adverse effects of tazorac.  All of the patient's questions and concerns were addressed. Spironolactone Counseling: Patient advised regarding risks of diarrhea, abdominal pain, hyperkalemia, birth defects (for female patients), liver toxicity and renal toxicity. The patient may need blood work to monitor liver and kidney function and potassium levels while on therapy. The patient verbalized understanding of the proper use and possible adverse effects of spironolactone.  All of the patient's questions and concerns were addressed. Doxycycline Pregnancy And Lactation Text: This medication is Pregnancy Category D and not consider safe during pregnancy. It is also excreted in breast milk but is considered safe for shorter treatment courses. Doxycycline Counseling:  Patient counseled regarding possible photosensitivity and increased risk for sunburn.  Patient instructed to avoid sunlight, if possible.  When exposed to sunlight, patients should wear protective clothing, sunglasses, and sunscreen.  The patient was instructed to call the office immediately if the following severe adverse effects occur:  hearing changes, easy bruising/bleeding, severe headache, or vision changes.  The patient verbalized understanding of the proper use and possible adverse effects of doxycycline.  All of the patient's questions and concerns were addressed. Azithromycin Counseling:  I discussed with the patient the risks of azithromycin including but not limited to GI upset, allergic reaction, drug rash, diarrhea, and yeast infections. Topical Sulfur Applications Pregnancy And Lactation Text: This medication is Pregnancy Category C and has an unknown safety profile during pregnancy. It is unknown if this topical medication is excreted in breast milk. Azithromycin Pregnancy And Lactation Text: This medication is considered safe during pregnancy and is also secreted in breast milk. Bactrim Pregnancy And Lactation Text: This medication is Pregnancy Category D and is known to cause fetal risk.  It is also excreted in breast milk. Topical Clindamycin Pregnancy And Lactation Text: This medication is Pregnancy Category B and is considered safe during pregnancy. It is unknown if it is excreted in breast milk. Dapsone Pregnancy And Lactation Text: This medication is Pregnancy Category C and is not considered safe during pregnancy or breast feeding. Isotretinoin Counseling: Patient should get monthly blood tests, not donate blood, not drive at night if vision affected, not share medication, and not undergo elective surgery for 6 months after tx completed. Side effects reviewed, pt to contact office should one occur. Minocycline Counseling: Patient advised regarding possible photosensitivity and discoloration of the teeth, skin, lips, tongue and gums.  Patient instructed to avoid sunlight, if possible.  When exposed to sunlight, patients should wear protective clothing, sunglasses, and sunscreen.  The patient was instructed to call the office immediately if the following severe adverse effects occur:  hearing changes, easy bruising/bleeding, severe headache, or vision changes.  The patient verbalized understanding of the proper use and possible adverse effects of minocycline.  All of the patient's questions and concerns were addressed. Topical Retinoid Pregnancy And Lactation Text: This medication is Pregnancy Category C. It is unknown if this medication is excreted in breast milk. Erythromycin Counseling:  I discussed with the patient the risks of erythromycin including but not limited to GI upset, allergic reaction, drug rash, diarrhea, increase in liver enzymes, and yeast infections. Fall with Harm Risk Spironolactone Pregnancy And Lactation Text: This medication can cause feminization of the male fetus and should be avoided during pregnancy. The active metabolite is also found in breast milk. Isotretinoin Pregnancy And Lactation Text: This medication is Pregnancy Category X and is considered extremely dangerous during pregnancy. It is unknown if it is excreted in breast milk. Bactrim Counseling:  I discussed with the patient the risks of sulfa antibiotics including but not limited to GI upset, allergic reaction, drug rash, diarrhea, dizziness, photosensitivity, and yeast infections.  Rarely, more serious reactions can occur including but not limited to aplastic anemia, agranulocytosis, methemoglobinemia, blood dyscrasias, liver or kidney failure, lung infiltrates or desquamative/blistering drug rashes. Dapsone Counseling: I discussed with the patient the risks of dapsone including but not limited to hemolytic anemia, agranulocytosis, rashes, methemoglobinemia, kidney failure, peripheral neuropathy, headaches, GI upset, and liver toxicity.  Patients who start dapsone require monitoring including baseline LFTs and weekly CBCs for the first month, then every month thereafter.  The patient verbalized understanding of the proper use and possible adverse effects of dapsone.  All of the patient's questions and concerns were addressed. Erythromycin Pregnancy And Lactation Text: This medication is Pregnancy Category B and is considered safe during pregnancy. It is also excreted in breast milk. High Dose Vitamin A Pregnancy And Lactation Text: High dose vitamin A therapy is contraindicated during pregnancy and breast feeding. Benzoyl Peroxide Pregnancy And Lactation Text: This medication is Pregnancy Category C. It is unknown if benzoyl peroxide is excreted in breast milk. High Dose Vitamin A Counseling: Side effects reviewed, pt to contact office should one occur. Benzoyl Peroxide Counseling: Patient counseled that medicine may cause skin irritation and bleach clothing.  In the event of skin irritation, the patient was advised to reduce the amount of the drug applied or use it less frequently.   The patient verbalized understanding of the proper use and possible adverse effects of benzoyl peroxide.  All of the patient's questions and concerns were addressed. Birth Control Pills Pregnancy And Lactation Text: This medication should be avoided if pregnant and for the first 30 days post-partum. Topical Sulfur Applications Counseling: Topical Sulfur Counseling: Patient counseled that this medication may cause skin irritation or allergic reactions.  In the event of skin irritation, the patient was advised to reduce the amount of the drug applied or use it less frequently.   The patient verbalized understanding of the proper use and possible adverse effects of topical sulfur application.  All of the patient's questions and concerns were addressed. Birth Control Pills Counseling: Birth Control Pill Counseling: I discussed with the patient the potential side effects of OCPs including but not limited to increased risk of stroke, heart attack, thrombophlebitis, deep venous thrombosis, hepatic adenomas, breast changes, GI upset, headaches, and depression.  The patient verbalized understanding of the proper use and possible adverse effects of OCPs. All of the patient's questions and concerns were addressed. Topical Clindamycin Counseling: Patient counseled that this medication may cause skin irritation or allergic reactions.  In the event of skin irritation, the patient was advised to reduce the amount of the drug applied or use it less frequently.   The patient verbalized understanding of the proper use and possible adverse effects of clindamycin.  All of the patient's questions and concerns were addressed. Topical Retinoid counseling:  Patient advised to apply a pea-sized amount only at bedtime and wait 30 minutes after washing their face before applying.  If too drying, patient may add a non-comedogenic moisturizer. The patient verbalized understanding of the proper use and possible adverse effects of retinoids.  All of the patient's questions and concerns were addressed. Tazorac Pregnancy And Lactation Text: This medication is not safe during pregnancy. It is unknown if this medication is excreted in breast milk.

## 2020-11-18 NOTE — ED PROVIDER NOTE - SECONDARY DIAGNOSIS.
Long conversation with patient's daughter Tressa, will send labs, UA and cxr.  If no metabolic/infectious abnormalities will likely dc back to NH.  Expressed that patient should be evaluated by PMD and psych at nursing facility.  -Abimael Tinajero PA-C Patient seen at bedside in NAD.  VSS.  Patient resting comfortably without complaints. labs, ct and cxr unremarkable.  Discussed with daughter, son and patient agreeable with plan to go back to assisted living.  Spoke with LEONARDO Arias at the [facility] who is agreeable with discharge back to the facility.  Patient is currently tolerating PO (turkey sandwich, apple sauce and juice).  WIll DC back to assisted living to follow up with PMD and psych as outpatient.  -Abimael Tinajero PA-C COPD exacerbation Anemia

## 2024-11-26 NOTE — PROGRESS NOTE ADULT - SUBJECTIVE AND OBJECTIVE BOX
Meds:  Zosyn 3.375 gm IVPB q 12 hours.    Allergies:  Allergies    No Known Allergies    Intolerances      ROS  [  ] UNABLE TO ELICIT    General:  [  ] None  [ x ] Fever  [  ] Chills  [  ] Malaise    Skin:  [ x ] None [  ] Rash  [  ] Wound  [  ] Ulcer    HEENT:  [ x ] None  [  ] Sore Throat  [  ] Nasal congestion/ runny nose  [  ] Photophobia [  ] Neck pain      Chest:  [  ] None   [ x ] SOB  [ x ] Cough  [  ] None    Cardiovascular:   [ x ] None  [  ] CP  [  ] Palpitation    Gastrointestinal:  [ x ] None  [  ] Abd pain   [  ] Nausea    [  ] Vomiting   [  ] Diarrhea	     Genitourinary:  [ x ] None [  ] Polyuria   [  ] Urgency  [  ] Frequency  [  ] Dysuria    [  ]  Hematuria       Musculoskeletal:  [  ] None [  ] Back Pain	[  ] Body aches  [  ] Joint pain [ x ] legs edema    Neurological: [  ] None [  ]Dizziness  [  ]Visual Disturbance  [  ]Headaches   [ x ] Weakness          PHYSICAL EXAM:  Vital Signs Last 24 Hrs  T(C): 34.8 (10 Mar 2018 06:00), Max: 36.2 (09 Mar 2018 07:49)  T(F): 94.7 (10 Mar 2018 06:00), Max: 97.2 (09 Mar 2018 15:40)  HR: 74 (10 Mar 2018 06:00) (68 - 81)  BP: 111/47 (10 Mar 2018 06:00) (73/55 - 146/122)  BP(mean): 64 (10 Mar 2018 06:00) (51 - 65)  RR: 22 (10 Mar 2018 06:00) (16 - 27)  SpO2: 92% (10 Mar 2018 06:00) (92% - 100%)    Constitutional:    HEENT: [ x ] Wnl  [  ] Pharyngeal congestion    Neck:  [  ] Supple  [  ]Lymphadenopathy  [  ] No JVD   [  ] JVD  [  ] Masses   [  ] WNL    CHEST/Respiratory:  [  ]Clear to auscultation  [ x ] Rales   [  ] Rhonchi   [ x ] Wheezing     [  ] Chest Tenderness      Cardiovascular:  [ x ] Reg S1 S2   [  ] Irreg S1 S2   [ x ]No Murmur  [  ] +ve Murmurs  [  ]Systolic [  ]Diastolic      Abdomen:  [ x ] Soft  [  ] No tendrerness  [  ] Tenderness  [  ] Organomegaly  [  ] ABD Distention  [  ] Rigidity                       [ x ] No Rigidity                       [ x ] No Rebound Tenderness  [  ] No Guarding Rigidity  [  ] Rebound Tenderness[  ] Guarding Rigidity                          [ x ]  +ve Bowel Sounds  [  ] Decreased Bowel Sounds    [  ] Absent Bowel Sounds                            Extremities: [  ] No edema [ x ] Edema  [  ] Clubbing   [  ] Cyanosis                         [ x ] No Tender Calf muscles  [  ] Tender Calf muscles                        [ x ] Palpable peripheral pulses    Neurological: [ x ] Awake  [ x ] Alert  [ x ] Oriented  x  3                           [  ] Confused  [  ] Drowsy  [  ] respond to painful stimuli  [  ] Unresponsive    Skin:  [ x ] Intact [  ] Redness [  ] Thrombophlebitis  [  ] Rashes  [  ] Dry  [  ] Ulcers    Ortho:  [  ] Joint Swelling  [  ] Joint erythema [  ] Joint tenderness                [  ] Increased temp. to touch  [  ] DJD [ x ] WNL          LABS/DIAGNOSTIC TESTS                        6.2    19.3  )-----------( 170      ( 09 Mar 2018 21:47 )             20.1   03-09    149<H>  |  112<H>  |  112<H>  ----------------------------<  193<H>  4.2   |  24  |  2.53<H>    Ca    8.4      09 Mar 2018 21:47  Phos  6.2     03-09  Mg     3.0     03-09      D-Dimer Assay, Quantitative: 1020: D-Dimer result less than 230 ng/mL DDU correlates with the absence of  thrombosis in a patient with a low and moderate       pre-test probability of thrombosis.  1 DDU is approximately equal to  2 ng/mL FEU (previous units). ng/mL DDU (03.09.18 @ 14:38)    Fibrin Split Products: >=5 <20: The fibrin/fibrinogen degradation product manual agglutination assay is  not adequately sensitive for evaluation of deep vein thrombosis and/or  pulmonary embolism. (03.09.18 @ 22:12)    Fibrinogen Assay: 492: Effective March 21st, the reference range for fibrinogen has changed. mg/dL (03.09.18 @ 22:12)              CULTURES:   Culture - Sputum . (03.08.18 @ 17:01)    Gram Stain:   Rare polymorphonuclear leukocytes per low power field  No Squamous epithelial cells per low power field  Few Gram Positive Cocci in Clusters per oil power field    Specimen Source: .Sputum Sputum      Culture - Blood (03.04.18 @ 21:44)    Specimen Source: .Blood Blood    Culture Results:   No growth to date.    Culture - Blood (03.04.18 @ 21:44)    Specimen Source: .Blood Blood    Culture Results:   No growth to date.        RADIOLOGY:    ******PRELIMINARY REPORT******    ******PRELIMINARY REPORT******          EXAM:  CT ABDOMEN AND PELVIS                          EXAM:  CT CHEST                            PROCEDURE DATE:  03/09/2018    ******PRELIMINARY REPORT******    ******PRELIMINARY REPORT******              INTERPRETATION:  PRELIMINARY REPORT:    Large left flank hematoma with retroperitoneal hematoma seen anterior to   the left psoas muscle.    Multifocal pneumonia appearing minimally worsened from the prior CT.   Right pleural effusion.    Distended gallbladder with gallstones. No april evidence of acute   cholecystitis.    Please follow up the official dictated report in the morning.           ******PRELIMINARY REPORT******    ******PRELIMINARY REPORT******                Assessment and Recommendation:   89 y/o F from home with HHA  walks with walker w/ PMHx of CHF unknown EF, CKD ( as per daughter one of her kidneys is not very functional), asthma, anemia,  hypothyroid, and HTN BIB EMS for SOB,  productive cough and fever.  Patient was admitted for pneumonia and CHF and was started on IV Rocephin.  CT chest suggested pneumonia.  3/9/18 Repeat CT chest and abdomen showed large left flank hematoma, and the H&H dropped, and patient was transferred to icu for close monitoring. Also ABX were changed to IV Zosyn and 1 gm of Vancomycin was given.    Problem/Recommendation - 1:  Problem: Pneumonia.   Recommendation:   1- Continue IV Zosyn.  2- procalcitonin level.  3- O2 as needed.  4- Pulmonary management.  5- Repeat CBC and BMP for follow up.  6- Follow blood cultures till final reports.  7- Blood and blood products transfusions as needed.  8- Vancomycin trough.    Problem/Recommendation - 2:  ·  Problem: COPD exacerbation.    Recommendation:   1- Bronchodilators.  2- O2 as needed.  3- Pulmonary evaluation and management.  4- Steroids as per primary, and pulmonary team.     Problem/Recommendation - 3:  ·  Problem: Anemia.    Recommendation:   1- Closely monitor H & H.  2- Observe for bleeding.   3- discontinue ASA.    Problem/Recommendation - 4:  ·  Problem: CHF (congestive heart failure).    Recommendation:   1- Monitor Blood pressure closely.  2- Blood pressure control.  3- BP. and cardiac meds and management as per cardiology and primary care team.     Discussed with medical resident. Detail Level: Detailed Quality 130: Documentation Of Current Medications In The Medical Record: Current Medications Documented Quality 431: Preventive Care And Screening: Unhealthy Alcohol Use - Screening: Patient not identified as an unhealthy alcohol user when screened for unhealthy alcohol use using a systematic screening method Quality 226: Preventive Care And Screening: Tobacco Use: Screening And Cessation Intervention: Patient screened for tobacco use and is an ex/non-smoker

## 2025-03-17 NOTE — PATIENT PROFILE ADULT. - INFLUENZA IMMUNIZATION DATE:
Previous RX was canceled due to pharmacy comment that \"pt will call when in need of refill.\"  RX resent per fax request from [email or web address].   October 2017